# Patient Record
Sex: FEMALE | Race: WHITE | Employment: UNEMPLOYED | URBAN - METROPOLITAN AREA
[De-identification: names, ages, dates, MRNs, and addresses within clinical notes are randomized per-mention and may not be internally consistent; named-entity substitution may affect disease eponyms.]

---

## 2022-02-09 ENCOUNTER — TELEPHONE (OUTPATIENT)
Dept: ORTHOPEDIC SURGERY | Age: 32
End: 2022-02-09

## 2022-02-09 ENCOUNTER — HOSPITAL ENCOUNTER (EMERGENCY)
Age: 32
Discharge: HOME | End: 2022-02-09
Attending: EMERGENCY MEDICINE
Payer: COMMERCIAL

## 2022-02-09 ENCOUNTER — APPOINTMENT (OUTPATIENT)
Dept: GENERAL RADIOLOGY | Age: 32
End: 2022-02-09
Payer: COMMERCIAL

## 2022-02-09 VITALS
WEIGHT: 190 LBS | OXYGEN SATURATION: 100 % | HEIGHT: 69 IN | SYSTOLIC BLOOD PRESSURE: 126 MMHG | DIASTOLIC BLOOD PRESSURE: 72 MMHG | RESPIRATION RATE: 18 BRPM | BODY MASS INDEX: 28.14 KG/M2 | TEMPERATURE: 98 F | HEART RATE: 64 BPM

## 2022-02-09 DIAGNOSIS — S82.892A FX ANKLE, LEFT, CLOSED, INITIAL ENCOUNTER: Primary | ICD-10-CM

## 2022-02-09 PROCEDURE — 6360000002 HC RX W HCPCS: Performed by: EMERGENCY MEDICINE

## 2022-02-09 PROCEDURE — 73610 X-RAY EXAM OF ANKLE: CPT

## 2022-02-09 PROCEDURE — 99284 EMERGENCY DEPT VISIT MOD MDM: CPT

## 2022-02-09 PROCEDURE — 29345 APPLICATION OF LONG LEG CAST: CPT

## 2022-02-09 PROCEDURE — 73590 X-RAY EXAM OF LOWER LEG: CPT

## 2022-02-09 PROCEDURE — 6370000000 HC RX 637 (ALT 250 FOR IP)

## 2022-02-09 PROCEDURE — 29505 APPLICATION LONG LEG SPLINT: CPT

## 2022-02-09 RX ORDER — ONDANSETRON 4 MG/1
TABLET, ORALLY DISINTEGRATING ORAL
Status: COMPLETED
Start: 2022-02-09 | End: 2022-02-09

## 2022-02-09 RX ORDER — ONDANSETRON 4 MG/1
4 TABLET, FILM COATED ORAL DAILY PRN
Qty: 30 TABLET | Refills: 0 | Status: SHIPPED | OUTPATIENT
Start: 2022-02-09 | End: 2022-03-01

## 2022-02-09 RX ORDER — FENTANYL CITRATE 50 UG/ML
100 INJECTION, SOLUTION INTRAMUSCULAR; INTRAVENOUS ONCE
Status: COMPLETED | OUTPATIENT
Start: 2022-02-09 | End: 2022-02-09

## 2022-02-09 RX ORDER — ONDANSETRON 4 MG/1
4 TABLET, ORALLY DISINTEGRATING ORAL ONCE
Status: COMPLETED | OUTPATIENT
Start: 2022-02-09 | End: 2022-02-09

## 2022-02-09 RX ORDER — HYDROCODONE BITARTRATE AND ACETAMINOPHEN 5; 325 MG/1; MG/1
1 TABLET ORAL EVERY 4 HOURS PRN
Qty: 18 TABLET | Refills: 0 | Status: SHIPPED | OUTPATIENT
Start: 2022-02-09 | End: 2022-02-10 | Stop reason: ALTCHOICE

## 2022-02-09 RX ADMIN — ONDANSETRON 4 MG: 4 TABLET, ORALLY DISINTEGRATING ORAL at 11:37

## 2022-02-09 RX ADMIN — FENTANYL CITRATE 100 MCG: 0.05 INJECTION, SOLUTION INTRAMUSCULAR; INTRAVENOUS at 11:38

## 2022-02-09 ASSESSMENT — PAIN DESCRIPTION - PAIN TYPE: TYPE: ACUTE PAIN

## 2022-02-09 ASSESSMENT — PAIN DESCRIPTION - LOCATION: LOCATION: ANKLE

## 2022-02-09 ASSESSMENT — PAIN SCALES - GENERAL
PAINLEVEL_OUTOF10: 5
PAINLEVEL_OUTOF10: 7

## 2022-02-09 ASSESSMENT — PAIN DESCRIPTION - ORIENTATION: ORIENTATION: LEFT

## 2022-02-09 NOTE — ED PROVIDER NOTES
eMERGENCY dEPARTMENT eNCOUnter        Dejuan Abeler    Chief Complaint   Patient presents with    Ankle Pain     Came from [facility] from home d/t slipping on ice and falling on left ankle, hearing a snap. Ketamine IM was given by EMS. HPI    Judith Turner is a 32 y.o. female who presents to the ER for evaluation of trauma left leg, mechanical fall and injury audible pop. Mode arrival is EMS, received fentanyl by EMS. Not pregnant. History of hysterectomy. No sensory or motor deficit. Pain with any attempted range of motion though. Left ankle and proximal leg tenderness mild. REVIEW OF SYSTEMS    See HPI for further details. Review of systems otherwise negative.      PAST MEDICAL HISTORY    Past Medical History:   Diagnosis Date    Arthritis     Asthma     exercise induced    GERD (gastroesophageal reflux disease)     History of gestational diabetes     IBS (irritable bowel syndrome)     Non-alcoholic fatty liver disease     PONV (postoperative nausea and vomiting)        SURGICAL HISTORY    Past Surgical History:   Procedure Laterality Date    ANKLE FRACTURE SURGERY Left 2/14/2022    OPEN REDUCTION INTERNAL FIXATION LEFT ANKLE WITH SYNDESMOSIS SCREW FIXATION performed by Vlad Gamez MD at [address]     HYSTERECTOMY      SHOULDER SURGERY Left     ligament repair       CURRENT MEDICATIONS        ALLERGIES    Allergies   Allergen Reactions    Hydrocodone Nausea And Vomiting    Oxycodone Nausea And Vomiting     Can take with anti-nausea medication       FAMILY HISTORY    Family History   Problem Relation Age of Onset    High Blood Pressure Mother     Kidney Disease Father         kidney stones       SOCIAL HISTORY    Social History     Socioeconomic History    Marital status:      Spouse name: None    Number of children: None    Years of education: None    Highest education level: None   Occupational History    None   Tobacco Use    Smoking status: Current Every Day Smoker     Types: E-Cigarettes    Smokeless tobacco: Never Used   Vaping Use    Vaping Use: Every day    Substances: Nicotine, Flavoring    Devices: Disposable   Substance and Sexual Activity    Alcohol use: Yes     Comment: rare    Drug use: Never    Sexual activity: Yes     Partners: Male   Other Topics Concern    None   Social History Narrative    None     Social Determinants of Health     Financial Resource Strain:     Difficulty of Paying Living Expenses: Not on file   Food Insecurity:     Worried About Running Out of Food in the Last Year: Not on file    Maya of Food in the Last Year: Not on file   Transportation Needs:     Lack of Transportation (Medical): Not on file    Lack of Transportation (Non-Medical):  Not on file   Physical Activity:     Days of Exercise per Week: Not on file    Minutes of Exercise per Session: Not on file   Stress:     Feeling of Stress : Not on file   Social Connections:     Frequency of Communication with Friends and Family: Not on file    Frequency of Social Gatherings with Friends and Family: Not on file    Attends Confucianist Services: Not on file    Active Member of [address] [place] [address] or Organizations: Not on file    Attends Club or Organization Meetings: Not on file    Marital Status: Not on file   Intimate Partner Violence:     Fear of Current or Ex-Partner: Not on file    Emotionally Abused: Not on file    Physically Abused: Not on file    Sexually Abused: Not on file   Housing Stability:     Unable to Pay for Housing in the Last Year: Not on file    Number of [place] in the Last Year: Not on file    Unstable Housing in the Last Year: Not on file       PHYSICAL EXAM    VITAL SIGNS: /72   Pulse 64   Temp 98 °F (36.7 °C) (Oral)   Resp 18   Ht 5' 9\" (1.753 m)   Wt 190 lb (86.2 kg)   SpO2 100%   BMI 28.06 kg/m²   Constitutional:  Well developed, well nourished, no acute distress, non-toxic appearance HENT:  Atraumatic, external ears normal, nose normal, oropharynx moist.  Neck- normal range of motion, no tenderness, supple   Respiratory:  No respiratory distress, normal breath sounds. Cardiovascular:  Normal rate, normal rhythm, no murmurs, no gallops, no rubs   GI:  Soft, nondistended, normal bowel sounds, nontender   Musculoskeletal: Bilateral malleoli tenderness left mild proximal left leg tenderness  Integument:  Well hydrated, no rash   Neurologic: No sensory or motor deficit    RADIOLOGY/PROCEDURES    Trimalleolar fracture left foot and ankle    ED COURSE & MEDICAL DECISION MAKING    Pertinent Labs & Imaging studies reviewed. (See chart for details)  Sugar tong splint of the extremities, analgesia, consultation with orthopedics was performed, outpatient follow-up with orthopedics for definitive surgical management. FINAL IMPRESSION    1. Trimalleolar fracture left foot and ankle closed  2.          Massimo Beavers MD  47/16/35 1904

## 2022-02-09 NOTE — ED NOTES
Bed: 11  Expected date:   Expected time:   Means of arrival:   Comments:  Chavo Mario RN  02/09/22 6713

## 2022-02-09 NOTE — TELEPHONE ENCOUNTER
CPT: 97032  BODY PART: left ankle  STATUS: outpatient  AUTHORIZATION: NPR    Submitted online with [facility], [facility] Decision ID #:[id number]

## 2022-02-10 ENCOUNTER — OFFICE VISIT (OUTPATIENT)
Dept: ORTHOPEDIC SURGERY | Age: 32
End: 2022-02-10
Payer: COMMERCIAL

## 2022-02-10 VITALS — HEIGHT: 69 IN | BODY MASS INDEX: 28.88 KG/M2 | WEIGHT: 195 LBS

## 2022-02-10 DIAGNOSIS — S82.852A CLOSED TRIMALLEOLAR FRACTURE OF LEFT ANKLE, INITIAL ENCOUNTER: Primary | ICD-10-CM

## 2022-02-10 PROCEDURE — G8484 FLU IMMUNIZE NO ADMIN: HCPCS | Performed by: ORTHOPAEDIC SURGERY

## 2022-02-10 PROCEDURE — G8427 DOCREV CUR MEDS BY ELIG CLIN: HCPCS | Performed by: ORTHOPAEDIC SURGERY

## 2022-02-10 PROCEDURE — G8419 CALC BMI OUT NRM PARAM NOF/U: HCPCS | Performed by: ORTHOPAEDIC SURGERY

## 2022-02-10 PROCEDURE — 99203 OFFICE O/P NEW LOW 30 MIN: CPT | Performed by: ORTHOPAEDIC SURGERY

## 2022-02-10 RX ORDER — OXYCODONE HYDROCHLORIDE AND ACETAMINOPHEN 5; 325 MG/1; MG/1
1-2 TABLET ORAL EVERY 6 HOURS PRN
Qty: 30 TABLET | Refills: 0 | Status: ON HOLD | OUTPATIENT
Start: 2022-02-10 | End: 2022-02-14 | Stop reason: HOSPADM

## 2022-02-10 NOTE — PROGRESS NOTES
Dioni Sales  [id number]  February 10, 2022    Chief Complaint   Patient presents with    Ankle Injury     Left ankle. She fell on ice on 2/9/22           History: The patient is a 49-year-old female who is here for evaluation of the left ankle. The patient reportedly slipped on some ice and injured the left ankle. She immediately had a left ankle pain and swelling. The injury occurred on 2/9/2022. The patient has been taking Norco.  The pain medication is not helping. She denies any numbness or tingling. She denies any other associated injuries. This is a consult from Chaparro Rao MD for left ankle pain and swelling        The patient's  past medical history, medications, allergies,  family history, social history, and have been reviewed, and dated and are recorded in the chart. Pertinent items are noted in HPI. Review of systems reviewed from Pertinent History Form dated on 2/10/2022 and available in the patient's chart under the Media tab. Ht 5' 9\" (1.753 m)   Wt 195 lb (88.5 kg)   BMI 28.80 kg/m²     Physical Examination:   The patient presents today in no acute distress, awake, alert, and oriented. She is well dressed, nourished and  groomed. Patient with normal affect. Examination of the gait, showed that the patient walks with a crutch, NWB left leg and in a splint. Examination of both ankles showing a decreased range of motion of the left ankle compare to the other side. There is  moderate swelling that can be seen, as well as ecchymosis. She has intact sensation to light touch in the tibial, peroneal, sural, and saphenous nerve distributions. Pedal pulses are 1-2 +. She has significant tenderness on deep palpation over the Lateral and Medial malleolus of the left ankle. The foot shows brisk capillary refill. The patient is able to wiggle all toes. The patient is non tender to palpation of the mid foot, hind foot, or forefoot.  The skin reveals no evidence of blistering or open areas. Imaging:  Xrays, 3 views of the left ankle from initial evaluation were reviewed. The patient has evidence of a left trimalleolar ankle fracture with syndesmotic disruption. The mortise is widened medially. Impression:   Left Trimalleolar ankle fracture with syndesmotic disruption. Plan: At this time we will schedule the patient for open reduction and internal fixation of the Trimalleolar ankle fracture with syndesmotic screw fixation. All risks including infection, neurovascular injury, malunion, nonunion, hardware failure, post traumatic arthritis, and the risk of anesthesia were discussed. The patient understands all risks and benefits and agrees to proceed. The patient was given a prescription for Percocet.

## 2022-02-10 NOTE — PROGRESS NOTES
Covid testing to be done DOS  If positive---Pt instructed to notify MD ASAP   If negative--pt was instructed to bring results DOP/DOS

## 2022-02-10 NOTE — PROGRESS NOTES
[facility] time______0740______        Surgery time____0940________    Take the following medications with a sip of water: Follow your MD/Surgeons pre-procedure instructions regarding your medications    Do not eat or drink anything after 12:00 midnight prior to your surgery. This includes water chewing gum, mints and ice chips. You may brush your teeth and gargle the morning of your surgery, but do not swallow the water     Please see your family doctor/pediatrician for a history and physical and/or concerning medications. Bring any test results/reports from your physicians office. If you are under the care of a heart doctor or specialist doctor, please be aware that you may be asked to them for clearance    You may be asked to stop blood thinners such as Coumadin, Plavix, Fragmin, Lovenox, etc., or any anti-inflammatories such as:  Aspirin, Ibuprofen, Advil, Naproxen prior to your surgery. We also ask that you stop any OTC medications such as fish oil, vitamin E, glucosamine, garlic, Multivitamins, COQ 10, etc.    We ask that you do not smoke 24 hours prior to surgery  We ask that you do not  drink any alcoholic beverages 24 hours prior to surgery     You must make arrangements for a responsible adult to take you home after your surgery. For your safety you will not be allowed to leave alone or drive yourself home. Your surgery will be cancelled if you do not have a ride home. Also for your safety, it is strongly suggested that someone stay with you the first 24 hours after your surgery. A parent or legal guardian must accompany a child scheduled for surgery and plan to stay at the hospital until the child is discharged. Please do not bring other children with you. For your comfort, please wear simple loose fitting clothing to the hospital.  Please do not bring valuables.     Do not wear any make-up or nail polish on your fingers or toes      For your safety, please do not wear any jewelry or body piercing's on the day of surgery. All jewelry must be removed. If you have dentures, they will be removed before going to operating room. For your convenience, we will provide you with a container. If you wear contact lenses or glasses, they will be removed, please bring a case for them. If you have a living will and a durable power of  for healthcare, please bring in a copy. As part of our patient safety program to minimize surgical site infections, we ask you to do the following:    · Please notify your surgeon if you develop any illness between         now and the  day of your surgery. · This includes a cough, cold, fever, sore throat, nausea,         or vomiting, and diarrhea, etc.  ·  Please notify your surgeon if you experience dizziness, shortness         of breath or blurred vision between now and the time of your surgery. Do not shave your operative site 96 hours prior to surgery. For face and neck surgery, men may use an electric razor 48 hours   prior to surgery. You may shower the night before surgery or the morning of   your surgery with an antibacterial soap. You will need to bring a photo ID and insurance card    [facility] has an onsite pharmacy, would you like to utilize our pharmacy     If you will be staying overnight and use a C-pap machine, please bring   your C-pap to hospital     Our goal is to provide you with excellent care, therefore, visitors will be limited to two(2) in the room at a time so that we may focus on providing this care for you. Please contact pre-admission testing if you have any further questions. [facility] phone number:  [phone] PAT fax number:  [phone]  Please note these are generalized instructions for all surgical cases, you may be provided with more specific instructions according to your surgery.

## 2022-02-10 NOTE — PROGRESS NOTES
C-Difficile admission screening and protocol:     * Admitted with diarrhea? YES____    NO_X____     *Prior history of C-Diff. In last 3 months? YES____   NO__X___     *Antibiotic use in the past 6-8 weeks? NO___X___YES______                 If yes which  ANTIBIOTIC AND REASON______     *Prior hospitalization or nursing home in the last month?  YES____   NO__X_

## 2022-02-11 ENCOUNTER — ANESTHESIA EVENT (OUTPATIENT)
Dept: OPERATING ROOM | Age: 32
End: 2022-02-11
Payer: COMMERCIAL

## 2022-02-14 ENCOUNTER — HOSPITAL ENCOUNTER (OUTPATIENT)
Age: 32
Setting detail: OUTPATIENT SURGERY
Discharge: HOME | End: 2022-02-14
Attending: ORTHOPAEDIC SURGERY | Admitting: ORTHOPAEDIC SURGERY
Payer: COMMERCIAL

## 2022-02-14 ENCOUNTER — ANESTHESIA (OUTPATIENT)
Dept: OPERATING ROOM | Age: 32
End: 2022-02-14
Payer: COMMERCIAL

## 2022-02-14 ENCOUNTER — APPOINTMENT (OUTPATIENT)
Dept: GENERAL RADIOLOGY | Age: 32
End: 2022-02-14
Attending: ORTHOPAEDIC SURGERY
Payer: COMMERCIAL

## 2022-02-14 VITALS
OXYGEN SATURATION: 97 % | HEART RATE: 77 BPM | RESPIRATION RATE: 18 BRPM | SYSTOLIC BLOOD PRESSURE: 118 MMHG | HEIGHT: 69 IN | WEIGHT: 195 LBS | BODY MASS INDEX: 28.88 KG/M2 | DIASTOLIC BLOOD PRESSURE: 66 MMHG | TEMPERATURE: 97 F

## 2022-02-14 VITALS
SYSTOLIC BLOOD PRESSURE: 125 MMHG | OXYGEN SATURATION: 100 % | TEMPERATURE: 97.7 F | DIASTOLIC BLOOD PRESSURE: 56 MMHG | RESPIRATION RATE: 9 BRPM

## 2022-02-14 DIAGNOSIS — S82.892A CLOSED FRACTURE OF LEFT ANKLE, INITIAL ENCOUNTER: Primary | ICD-10-CM

## 2022-02-14 LAB — SARS-COV-2, NAAT: NOT DETECTED

## 2022-02-14 PROCEDURE — 7100000011 HC PHASE II RECOVERY - ADDTL 15 MIN: Performed by: ORTHOPAEDIC SURGERY

## 2022-02-14 PROCEDURE — A4217 STERILE WATER/SALINE, 500 ML: HCPCS | Performed by: ORTHOPAEDIC SURGERY

## 2022-02-14 PROCEDURE — 6360000002 HC RX W HCPCS: Performed by: NURSE ANESTHETIST, CERTIFIED REGISTERED

## 2022-02-14 PROCEDURE — 3600000014 HC SURGERY LEVEL 4 ADDTL 15MIN: Performed by: ORTHOPAEDIC SURGERY

## 2022-02-14 PROCEDURE — 2580000003 HC RX 258: Performed by: ANESTHESIOLOGY

## 2022-02-14 PROCEDURE — 3700000000 HC ANESTHESIA ATTENDED CARE: Performed by: ORTHOPAEDIC SURGERY

## 2022-02-14 PROCEDURE — 7100000000 HC PACU RECOVERY - FIRST 15 MIN: Performed by: ORTHOPAEDIC SURGERY

## 2022-02-14 PROCEDURE — 7100000001 HC PACU RECOVERY - ADDTL 15 MIN: Performed by: ORTHOPAEDIC SURGERY

## 2022-02-14 PROCEDURE — 3700000001 HC ADD 15 MINUTES (ANESTHESIA): Performed by: ORTHOPAEDIC SURGERY

## 2022-02-14 PROCEDURE — 7100000010 HC PHASE II RECOVERY - FIRST 15 MIN: Performed by: ORTHOPAEDIC SURGERY

## 2022-02-14 PROCEDURE — 3600000004 HC SURGERY LEVEL 4 BASE: Performed by: ORTHOPAEDIC SURGERY

## 2022-02-14 PROCEDURE — 2500000003 HC RX 250 WO HCPCS: Performed by: ORTHOPAEDIC SURGERY

## 2022-02-14 PROCEDURE — 2709999900 HC NON-CHARGEABLE SUPPLY: Performed by: ORTHOPAEDIC SURGERY

## 2022-02-14 PROCEDURE — 87635 SARS-COV-2 COVID-19 AMP PRB: CPT

## 2022-02-14 PROCEDURE — 3209999900 FLUORO FOR SURGICAL PROCEDURES

## 2022-02-14 PROCEDURE — 6370000000 HC RX 637 (ALT 250 FOR IP): Performed by: STUDENT IN AN ORGANIZED HEALTH CARE EDUCATION/TRAINING PROGRAM

## 2022-02-14 PROCEDURE — 2500000003 HC RX 250 WO HCPCS: Performed by: NURSE ANESTHETIST, CERTIFIED REGISTERED

## 2022-02-14 PROCEDURE — C1713 ANCHOR/SCREW BN/BN,TIS/BN: HCPCS | Performed by: ORTHOPAEDIC SURGERY

## 2022-02-14 PROCEDURE — 2580000003 HC RX 258: Performed by: ORTHOPAEDIC SURGERY

## 2022-02-14 PROCEDURE — 6360000002 HC RX W HCPCS: Performed by: ORTHOPAEDIC SURGERY

## 2022-02-14 PROCEDURE — 73610 X-RAY EXAM OF ANKLE: CPT

## 2022-02-14 PROCEDURE — 6360000002 HC RX W HCPCS: Performed by: STUDENT IN AN ORGANIZED HEALTH CARE EDUCATION/TRAINING PROGRAM

## 2022-02-14 DEVICE — SCREW BNE L14MM DIA3.5MM CORT S STL ST NONCANNULATED LOK: Type: IMPLANTABLE DEVICE | Site: ANKLE | Status: FUNCTIONAL

## 2022-02-14 DEVICE — SCREW BNE L12MM DIA3.5MM CORT S STL ST NONCANNULATED LOK: Type: IMPLANTABLE DEVICE | Site: ANKLE | Status: FUNCTIONAL

## 2022-02-14 DEVICE — SCREW BNE L50MM DIA3.5MM CORT S STL ST NONCANNULATED LOK
Type: IMPLANTABLE DEVICE | Site: ANKLE | Status: NON-FUNCTIONAL
Removed: 2022-04-25

## 2022-02-14 DEVICE — PLATE BNE L93MM 8 H S STL 1/3 TBLR LOK COMPR W/ CLLR FOR: Type: IMPLANTABLE DEVICE | Site: ANKLE | Status: FUNCTIONAL

## 2022-02-14 DEVICE — SCREW BNE L16MM DIA4MM CANC S STL ST CANN NONLOCKING FULL: Type: IMPLANTABLE DEVICE | Site: ANKLE | Status: FUNCTIONAL

## 2022-02-14 DEVICE — SCREW CORTX SLFTP FTHRD 3.5X18MM: Type: IMPLANTABLE DEVICE | Site: ANKLE | Status: FUNCTIONAL

## 2022-02-14 DEVICE — SCREW BNE L40MM DIA4MM CANC S STL PARTIALLY THRD SM HEX
Type: IMPLANTABLE DEVICE | Site: ANKLE | Status: NON-FUNCTIONAL
Removed: 2022-04-25

## 2022-02-14 RX ORDER — SODIUM CHLORIDE 9 MG/ML
25 INJECTION, SOLUTION INTRAVENOUS PRN
Status: DISCONTINUED | OUTPATIENT
Start: 2022-02-14 | End: 2022-02-14 | Stop reason: HOSPADM

## 2022-02-14 RX ORDER — OXYCODONE HYDROCHLORIDE AND ACETAMINOPHEN 5; 325 MG/1; MG/1
1 TABLET ORAL
Status: COMPLETED | OUTPATIENT
Start: 2022-02-14 | End: 2022-02-14

## 2022-02-14 RX ORDER — LIDOCAINE HYDROCHLORIDE 20 MG/ML
INJECTION, SOLUTION EPIDURAL; INFILTRATION; INTRACAUDAL; PERINEURAL PRN
Status: DISCONTINUED | OUTPATIENT
Start: 2022-02-14 | End: 2022-02-14 | Stop reason: SDUPTHER

## 2022-02-14 RX ORDER — MAGNESIUM HYDROXIDE 1200 MG/15ML
LIQUID ORAL CONTINUOUS PRN
Status: COMPLETED | OUTPATIENT
Start: 2022-02-14 | End: 2022-02-14

## 2022-02-14 RX ORDER — PROCHLORPERAZINE EDISYLATE 5 MG/ML
5 INJECTION INTRAMUSCULAR; INTRAVENOUS
Status: DISCONTINUED | OUTPATIENT
Start: 2022-02-14 | End: 2022-02-14 | Stop reason: HOSPADM

## 2022-02-14 RX ORDER — MEPERIDINE HYDROCHLORIDE 25 MG/ML
12.5 INJECTION INTRAMUSCULAR; INTRAVENOUS; SUBCUTANEOUS EVERY 5 MIN PRN
Status: DISCONTINUED | OUTPATIENT
Start: 2022-02-14 | End: 2022-02-14 | Stop reason: HOSPADM

## 2022-02-14 RX ORDER — PROPOFOL 10 MG/ML
INJECTION, EMULSION INTRAVENOUS PRN
Status: DISCONTINUED | OUTPATIENT
Start: 2022-02-14 | End: 2022-02-14 | Stop reason: SDUPTHER

## 2022-02-14 RX ORDER — SCOLOPAMINE TRANSDERMAL SYSTEM 1 MG/1
1 PATCH, EXTENDED RELEASE TRANSDERMAL ONCE
Status: DISCONTINUED | OUTPATIENT
Start: 2022-02-14 | End: 2022-02-14 | Stop reason: HOSPADM

## 2022-02-14 RX ORDER — SODIUM CHLORIDE 0.9 % (FLUSH) 0.9 %
5-40 SYRINGE (ML) INJECTION PRN
Status: DISCONTINUED | OUTPATIENT
Start: 2022-02-14 | End: 2022-02-14 | Stop reason: HOSPADM

## 2022-02-14 RX ORDER — SODIUM CHLORIDE 9 MG/ML
INJECTION, SOLUTION INTRAVENOUS CONTINUOUS
Status: DISCONTINUED | OUTPATIENT
Start: 2022-02-14 | End: 2022-02-14 | Stop reason: HOSPADM

## 2022-02-14 RX ORDER — DEXAMETHASONE SODIUM PHOSPHATE 4 MG/ML
INJECTION, SOLUTION INTRA-ARTICULAR; INTRALESIONAL; INTRAMUSCULAR; INTRAVENOUS; SOFT TISSUE PRN
Status: DISCONTINUED | OUTPATIENT
Start: 2022-02-14 | End: 2022-02-14 | Stop reason: SDUPTHER

## 2022-02-14 RX ORDER — FENTANYL CITRATE 50 UG/ML
25 INJECTION, SOLUTION INTRAMUSCULAR; INTRAVENOUS EVERY 5 MIN PRN
Status: DISCONTINUED | OUTPATIENT
Start: 2022-02-14 | End: 2022-02-14 | Stop reason: HOSPADM

## 2022-02-14 RX ORDER — OXYCODONE HYDROCHLORIDE AND ACETAMINOPHEN 5; 325 MG/1; MG/1
1 TABLET ORAL EVERY 6 HOURS PRN
Qty: 28 TABLET | Refills: 0 | Status: SHIPPED | OUTPATIENT
Start: 2022-02-14 | End: 2022-02-21

## 2022-02-14 RX ORDER — OXYCODONE HYDROCHLORIDE AND ACETAMINOPHEN 5; 325 MG/1; MG/1
1 TABLET ORAL ONCE
Status: DISCONTINUED | OUTPATIENT
Start: 2022-02-14 | End: 2022-02-14 | Stop reason: HOSPADM

## 2022-02-14 RX ORDER — HYDRALAZINE HYDROCHLORIDE 20 MG/ML
5 INJECTION INTRAMUSCULAR; INTRAVENOUS EVERY 10 MIN PRN
Status: DISCONTINUED | OUTPATIENT
Start: 2022-02-14 | End: 2022-02-14 | Stop reason: HOSPADM

## 2022-02-14 RX ORDER — LABETALOL HYDROCHLORIDE 5 MG/ML
5 INJECTION, SOLUTION INTRAVENOUS EVERY 10 MIN PRN
Status: DISCONTINUED | OUTPATIENT
Start: 2022-02-14 | End: 2022-02-14 | Stop reason: HOSPADM

## 2022-02-14 RX ORDER — FENTANYL CITRATE 50 UG/ML
50 INJECTION, SOLUTION INTRAMUSCULAR; INTRAVENOUS EVERY 5 MIN PRN
Status: DISCONTINUED | OUTPATIENT
Start: 2022-02-14 | End: 2022-02-14 | Stop reason: HOSPADM

## 2022-02-14 RX ORDER — ONDANSETRON 2 MG/ML
INJECTION INTRAMUSCULAR; INTRAVENOUS PRN
Status: DISCONTINUED | OUTPATIENT
Start: 2022-02-14 | End: 2022-02-14 | Stop reason: SDUPTHER

## 2022-02-14 RX ORDER — DIPHENHYDRAMINE HYDROCHLORIDE 50 MG/ML
12.5 INJECTION INTRAMUSCULAR; INTRAVENOUS
Status: DISCONTINUED | OUTPATIENT
Start: 2022-02-14 | End: 2022-02-14 | Stop reason: HOSPADM

## 2022-02-14 RX ORDER — ONDANSETRON 2 MG/ML
4 INJECTION INTRAMUSCULAR; INTRAVENOUS
Status: DISCONTINUED | OUTPATIENT
Start: 2022-02-14 | End: 2022-02-14 | Stop reason: HOSPADM

## 2022-02-14 RX ORDER — BUPIVACAINE HYDROCHLORIDE 2.5 MG/ML
INJECTION, SOLUTION EPIDURAL; INFILTRATION; INTRACAUDAL
Status: COMPLETED | OUTPATIENT
Start: 2022-02-14 | End: 2022-02-14

## 2022-02-14 RX ORDER — MIDAZOLAM HYDROCHLORIDE 1 MG/ML
INJECTION INTRAMUSCULAR; INTRAVENOUS PRN
Status: DISCONTINUED | OUTPATIENT
Start: 2022-02-14 | End: 2022-02-14 | Stop reason: SDUPTHER

## 2022-02-14 RX ORDER — SODIUM CHLORIDE 0.9 % (FLUSH) 0.9 %
5-40 SYRINGE (ML) INJECTION EVERY 12 HOURS SCHEDULED
Status: DISCONTINUED | OUTPATIENT
Start: 2022-02-14 | End: 2022-02-14 | Stop reason: HOSPADM

## 2022-02-14 RX ORDER — FENTANYL CITRATE 50 UG/ML
INJECTION, SOLUTION INTRAMUSCULAR; INTRAVENOUS PRN
Status: DISCONTINUED | OUTPATIENT
Start: 2022-02-14 | End: 2022-02-14 | Stop reason: SDUPTHER

## 2022-02-14 RX ADMIN — DEXAMETHASONE SODIUM PHOSPHATE 10 MG: 4 INJECTION, SOLUTION INTRAMUSCULAR; INTRAVENOUS at 10:01

## 2022-02-14 RX ADMIN — PROPOFOL 200 MG: 10 INJECTION, EMULSION INTRAVENOUS at 10:01

## 2022-02-14 RX ADMIN — LIDOCAINE HYDROCHLORIDE 100 MG: 20 INJECTION, SOLUTION EPIDURAL; INFILTRATION; INTRACAUDAL; PERINEURAL at 10:01

## 2022-02-14 RX ADMIN — FENTANYL CITRATE 50 MCG: 50 INJECTION, SOLUTION INTRAMUSCULAR; INTRAVENOUS at 12:09

## 2022-02-14 RX ADMIN — FENTANYL CITRATE 50 MCG: 50 INJECTION INTRAMUSCULAR; INTRAVENOUS at 10:04

## 2022-02-14 RX ADMIN — MIDAZOLAM 2 MG: 1 INJECTION INTRAMUSCULAR; INTRAVENOUS at 09:59

## 2022-02-14 RX ADMIN — HYDROMORPHONE HYDROCHLORIDE 1 MG: 1 INJECTION, SOLUTION INTRAMUSCULAR; INTRAVENOUS; SUBCUTANEOUS at 10:25

## 2022-02-14 RX ADMIN — OXYCODONE AND ACETAMINOPHEN 1 TABLET: 5; 325 TABLET ORAL at 13:57

## 2022-02-14 RX ADMIN — ONDANSETRON 4 MG: 2 INJECTION INTRAMUSCULAR; INTRAVENOUS at 10:52

## 2022-02-14 RX ADMIN — CEFAZOLIN 2000 MG: 10 INJECTION, POWDER, FOR SOLUTION INTRAVENOUS at 09:59

## 2022-02-14 RX ADMIN — SODIUM CHLORIDE: 9 INJECTION, SOLUTION INTRAVENOUS at 09:59

## 2022-02-14 RX ADMIN — FENTANYL CITRATE 50 MCG: 50 INJECTION INTRAMUSCULAR; INTRAVENOUS at 10:08

## 2022-02-14 RX ADMIN — FENTANYL CITRATE 25 MCG: 50 INJECTION, SOLUTION INTRAMUSCULAR; INTRAVENOUS at 12:40

## 2022-02-14 RX ADMIN — FENTANYL CITRATE 25 MCG: 50 INJECTION, SOLUTION INTRAMUSCULAR; INTRAVENOUS at 12:45

## 2022-02-14 ASSESSMENT — PULMONARY FUNCTION TESTS
PIF_VALUE: 2
PIF_VALUE: 1
PIF_VALUE: 9
PIF_VALUE: 9
PIF_VALUE: 10
PIF_VALUE: 3
PIF_VALUE: 1
PIF_VALUE: 3
PIF_VALUE: 2
PIF_VALUE: 8
PIF_VALUE: 8
PIF_VALUE: 1
PIF_VALUE: 9
PIF_VALUE: 10
PIF_VALUE: 8
PIF_VALUE: 3
PIF_VALUE: 3
PIF_VALUE: 8
PIF_VALUE: 10
PIF_VALUE: 3
PIF_VALUE: 1
PIF_VALUE: 10
PIF_VALUE: 9
PIF_VALUE: 10
PIF_VALUE: 3
PIF_VALUE: 10
PIF_VALUE: 10
PIF_VALUE: 3
PIF_VALUE: 18
PIF_VALUE: 3
PIF_VALUE: 10
PIF_VALUE: 10
PIF_VALUE: 3
PIF_VALUE: 10
PIF_VALUE: 10
PIF_VALUE: 8
PIF_VALUE: 10
PIF_VALUE: 0
PIF_VALUE: 3
PIF_VALUE: 1
PIF_VALUE: 9
PIF_VALUE: 10
PIF_VALUE: 3
PIF_VALUE: 9
PIF_VALUE: 3
PIF_VALUE: 9
PIF_VALUE: 3
PIF_VALUE: 1
PIF_VALUE: 10
PIF_VALUE: 3
PIF_VALUE: 9
PIF_VALUE: 3
PIF_VALUE: 10
PIF_VALUE: 8
PIF_VALUE: 1
PIF_VALUE: 10
PIF_VALUE: 3
PIF_VALUE: 3
PIF_VALUE: 9
PIF_VALUE: 2
PIF_VALUE: 10
PIF_VALUE: 10
PIF_VALUE: 1
PIF_VALUE: 1
PIF_VALUE: 0
PIF_VALUE: 8
PIF_VALUE: 10
PIF_VALUE: 3
PIF_VALUE: 9
PIF_VALUE: 3
PIF_VALUE: 2
PIF_VALUE: 3
PIF_VALUE: 8
PIF_VALUE: 10
PIF_VALUE: 8
PIF_VALUE: 3
PIF_VALUE: 10
PIF_VALUE: 4
PIF_VALUE: 3
PIF_VALUE: 9
PIF_VALUE: 3
PIF_VALUE: 8

## 2022-02-14 ASSESSMENT — PAIN DESCRIPTION - ORIENTATION
ORIENTATION: LEFT

## 2022-02-14 ASSESSMENT — PAIN DESCRIPTION - PROGRESSION
CLINICAL_PROGRESSION: GRADUALLY WORSENING
CLINICAL_PROGRESSION: NOT CHANGED
CLINICAL_PROGRESSION: GRADUALLY IMPROVING
CLINICAL_PROGRESSION: GRADUALLY IMPROVING
CLINICAL_PROGRESSION: NOT CHANGED
CLINICAL_PROGRESSION: GRADUALLY IMPROVING

## 2022-02-14 ASSESSMENT — PAIN - FUNCTIONAL ASSESSMENT
PAIN_FUNCTIONAL_ASSESSMENT: PREVENTS OR INTERFERES SOME ACTIVE ACTIVITIES AND ADLS
PAIN_FUNCTIONAL_ASSESSMENT: 0-10
PAIN_FUNCTIONAL_ASSESSMENT: PREVENTS OR INTERFERES SOME ACTIVE ACTIVITIES AND ADLS

## 2022-02-14 ASSESSMENT — PAIN SCALES - GENERAL
PAINLEVEL_OUTOF10: 5
PAINLEVEL_OUTOF10: 6
PAINLEVEL_OUTOF10: 4
PAINLEVEL_OUTOF10: 6
PAINLEVEL_OUTOF10: 0
PAINLEVEL_OUTOF10: 4
PAINLEVEL_OUTOF10: 7

## 2022-02-14 ASSESSMENT — PAIN DESCRIPTION - FREQUENCY
FREQUENCY: CONTINUOUS

## 2022-02-14 ASSESSMENT — PAIN DESCRIPTION - ONSET
ONSET: ON-GOING
ONSET: AWAKENED FROM SLEEP

## 2022-02-14 ASSESSMENT — PAIN DESCRIPTION - PAIN TYPE
TYPE: SURGICAL PAIN

## 2022-02-14 ASSESSMENT — LIFESTYLE VARIABLES: SMOKING_STATUS: 1

## 2022-02-14 ASSESSMENT — PAIN DESCRIPTION - LOCATION
LOCATION: ANKLE

## 2022-02-14 ASSESSMENT — PAIN DESCRIPTION - DESCRIPTORS
DESCRIPTORS: SQUEEZING
DESCRIPTORS: SQUEEZING
DESCRIPTORS: SORE
DESCRIPTORS: SQUEEZING
DESCRIPTORS: SQUEEZING
DESCRIPTORS: ACHING;THROBBING
DESCRIPTORS: SORE

## 2022-02-14 NOTE — ANESTHESIA PRE PROCEDURE
Department of Anesthesiology  Preprocedure Note       Name:  Dave Hou   Age:  32 y.o.  :  1990                                          MRN:  [id number]         Date:  2022      Surgeon: Aziza Hedrick):  Jarrett Frey MD    Procedure: Procedure(s):  OPEN REDUCTION INTERNAL FIXATION, LEFT ANKLE    Medications prior to admission:   Prior to Admission medications    Medication Sig Start Date End Date Taking? Authorizing Provider   oxyCODONE-acetaminophen (PERCOCET) 5-325 MG per tablet Take 1-2 tablets by mouth every 6 hours as needed for Pain for up to 5 days.  2/10/22 2/15/22 Yes Jarrett Frey MD   ondansetron [facility]) 4 MG tablet Take 1 tablet by mouth daily as needed for Nausea or Vomiting   Yes Damion Goodson MD       Current medications:    Current Facility-Administered Medications   Medication Dose Route Frequency Provider Last Rate Last Admin    ceFAZolin (ANCEF) 2000 mg in dextrose 5 % 100 mL IVPB  2,000 mg IntraVENous Once Jarrett Frey MD        0.9 % sodium chloride infusion   IntraVENous Continuous Priyanka Lewis MD        sodium chloride flush 0.9 % injection 5-40 mL  5-40 mL IntraVENous 2 times per day Priyanka Lewis MD        sodium chloride flush 0.9 % injection 5-40 mL  5-40 mL IntraVENous PRN Priyanka Lewis MD        0.9 % sodium chloride infusion  25 mL IntraVENous PRN Priyanka Lewis MD        scopolamine (TRANSDERM-SCOP) transdermal patch 1 patch  1 patch TransDERmal Once Dallin Valentine MD        HYDROmorphone (DILAUDID) injection 0.25 mg  0.25 mg IntraVENous Q5 Min PRN Dallin Valentine MD        HYDROmorphone (DILAUDID) injection 0.5 mg  0.5 mg IntraVENous Q5 Min PRN Dallin Valentine MD        oxyCODONE-acetaminophen (PERCOCET) 5-325 MG per tablet 1 tablet  1 tablet Oral Once PRN MD Alexis Pricedansetron [facility]) injection 4 mg  4 mg IntraVENous Once PRN Dallin Valentine MD        prochlorperazine (COMPAZINE) injection 5 mg  5 mg IntraVENous Once PRN Isabel Linn MD        labetalol (NORMODYNE;TRANDATE) injection 5 mg  5 mg IntraVENous Q10 Min PRN Isabel Linn MD        hydrALAZINE (APRESOLINE) injection 5 mg  5 mg IntraVENous Q10 Min PRN Isabel Linn MD        diphenhydrAMINE (BENADRYL) injection 12.5 mg  12.5 mg IntraVENous Once PRN Isabel Linn MD        meperidine (DEMEROL) injection 12.5 mg  12.5 mg IntraVENous Q5 Min PRN Isabel Linn MD           Allergies: Allergies   Allergen Reactions    Hydrocodone Nausea And Vomiting    Oxycodone Nausea And Vomiting     Can take with anti-nausea medication       Problem List:  There is no problem list on file for this patient.       Past Medical History:        Diagnosis Date    Arthritis     Asthma     exercise induced    GERD (gastroesophageal reflux disease)     History of gestational diabetes     IBS (irritable bowel syndrome)     Non-alcoholic fatty liver disease     PONV (postoperative nausea and vomiting)        Past Surgical History:        Procedure Laterality Date    HIP SURGERY Right     HYSTERECTOMY      SHOULDER SURGERY Left     ligament repair       Social History:    Social History     Tobacco Use    Smoking status: Current Every Day Smoker     Types: E-Cigarettes    Smokeless tobacco: Never Used   Substance Use Topics    Alcohol use: Yes     Comment: rare                                Ready to quit: Not Answered  Counseling given: Not Answered      Vital Signs (Current):   Vitals:    02/10/22 1405 02/14/22 0737 02/14/22 0753   BP:   111/70   Pulse:   66   Resp:   16   Temp:   96.5 °F (35.8 °C)   TempSrc:   Temporal   SpO2:   96%   Weight: 195 lb (88.5 kg) 195 lb (88.5 kg)    Height: 5' 9\" (1.753 m) 5' 9\" (1.753 m)                                               BP Readings from Last 3 Encounters:   02/14/22 111/70   02/09/22 126/72       NPO Status: Time of last liquid consumption: 2200 Time of last solid consumption: 1930                        Date of last liquid consumption: 02/13/22                        Date of last solid food consumption: 02/13/22    BMI:   Wt Readings from Last 3 Encounters:   02/14/22 195 lb (88.5 kg)   02/10/22 195 lb (88.5 kg)   02/09/22 190 lb (86.2 kg)     Body mass index is 28.8 kg/m². CBC: No results found for: WBC, RBC, HGB, HCT, MCV, RDW, PLT    CMP: No results found for: NA, K, CL, CO2, BUN, CREATININE, GFRAA, AGRATIO, LABGLOM, GLUCOSE, GLU, PROT, CALCIUM, BILITOT, ALKPHOS, AST, ALT    POC Tests: No results for input(s): POCGLU, POCNA, POCK, POCCL, POCBUN, POCHEMO, POCHCT in the last 72 hours. Coags: No results found for: PROTIME, INR, APTT    HCG (If Applicable): No results found for: PREGTESTUR, PREGSERUM, HCG, HCGQUANT     ABGs: No results found for: PHART, PO2ART, QQB7VOZ, EWV5OJU, BEART, M2KXDAIE     Type & Screen (If Applicable):  No results found for: LABABO, LABRH    Drug/Infectious Status (If Applicable):  No results found for: HIV, HEPCAB    COVID-19 Screening (If Applicable):   Lab Results   Component Value Date    COVID19 Not Detected 02/14/2022           Anesthesia Evaluation     history of anesthetic complications (prevented with scopolamine patch in past): PONV. Airway: Mallampati: II  TM distance: >3 FB   Neck ROM: full  Mouth opening: > = 3 FB Dental:      Comment: Denies loose teeth    Pulmonary: breath sounds clear to auscultation  (+) asthma: current smoker (Complete cessation encouraged)    (-) rhonchi, wheezes and rales          Patient did not smoke on day of surgery.                  Cardiovascular:  Exercise tolerance: good (>4 METS),       (-) hypertension, orthopnea and  GARIBAY      Rhythm: regular  Rate: normal                    Neuro/Psych:      (-) seizures, TIA and CVA            ROS comment: Nausea with pain meds in past GI/Hepatic/Renal:   (+) GERD:,      (-) liver disease, no renal disease and no morbid obesity Endo/Other:        (-) hypothyroidism, hyperthyroidism               Abdominal:   (+) obese,     Abdomen: soft. Vascular: Other Findings:           Anesthesia Plan      MAC     ASA 2     (Recently moved from [facility] to [facility]; limited records available through [facility]. NPO appropriate; denies nausea / reflux. Scopolamine patch ordered in preop. )  Induction: intravenous. MIPS: Postoperative opioids intended and Prophylactic antiemetics administered. Anesthetic plan and risks discussed with patient. Use of blood products discussed with patient whom. Plan discussed with CRNA. This pre-anesthesia assessment may be used as a history and physical.    DOS STAFF ADDENDUM:    Pt seen and examined, chart reviewed (including anesthesia, drug and allergy history). No interval changes to history and physical examination. Anesthetic plan, risks, benefits, alternatives, and personnel involved discussed with patient. Patient verbalized an understanding and agrees to proceed.       Alden Martinez MD  February 14, 2022  8:45 AM

## 2022-02-14 NOTE — PROGRESS NOTES
Patient more awake and alert. Patient C/O surgical pain at 7 of 10 and medicated per order. See MAR. VSS. IV patent.

## 2022-02-14 NOTE — PROGRESS NOTES
Patient admitted to PACU from OR. Patient opens eyes to name. Resp easy unlabored on 3L NC with SaO2 95%. Monitor in SR. Left lower leg cast/splint ace wrap dressing dry and intact with pulses palpable. Ice pack to left ankle and FOB elevated. Patient denies C/O pain or nausea. VSS. IV patent to right AC.

## 2022-02-14 NOTE — ANESTHESIA POSTPROCEDURE EVALUATION
Department of Anesthesiology  Postprocedure Note    Patient: Nick Sierra  MRN: [id number]  YOB: 1990  Date of evaluation: 2/14/2022  Time:  5:56 PM     Procedure Summary     Date: 02/14/22 Room / Location: [address]    Anesthesia Start: 1591 Anesthesia Stop: 1134    Procedure: OPEN REDUCTION INTERNAL FIXATION LEFT ANKLE WITH SYNDESMOSIS SCREW FIXATION (Left Ankle) Diagnosis: (TRIMALLEOLAR FRACTURE, LEFT ANKLE)    Surgeons: Edith Tong MD Responsible Provider: Andres Cutler MD    Anesthesia Type: MAC ASA Status: 2          Anesthesia Type: MAC    Ubaldo Phase I: Ubaldo Score: 9    Ubaldo Phase II: Ubaldo Score: 10    Last vitals: Reviewed and per EMR flowsheets. Anesthesia Post Evaluation    Patient location during evaluation: PACU  Patient participation: complete - patient participated  Level of consciousness: sleepy but conscious  Airway patency: patent  Nausea & Vomiting: no nausea and no vomiting  Complications: no  Cardiovascular status: hemodynamically stable and blood pressure returned to baseline  Respiratory status: spontaneous ventilation, nonlabored ventilation and room air  Hydration status: stable  Comments: Ms. Ryann Guevara resting comfortably in PACU. Will allow patient to become more alert before anticipate transfer to Phase II for planned discharge home.       Andres Cutler MD

## 2022-02-14 NOTE — PROGRESS NOTES
Patient resting more comfortably, dozing off and on. Pain level 5 of 10 and tolerable. VSS. IV patent. Patient denies C/O nausea.

## 2022-02-14 NOTE — PROGRESS NOTES
Patient  Awake and alert. Resp easy unlabored on room air O2 with SaO2 96%. Pain level 4 of 10 and tolerable. VSS. Left ankle dressing unchanged. Ice pack to left ankle. Patient denies C/O nausea. Patient stable to transfer to ACU for phase II.

## 2022-02-14 NOTE — PROGRESS NOTES
Patient C/O surgical pain at 6 of 10 and medicated per ordr. See MAR. VSS. IV patent. Patient denies C/O nausea. Taking ice chips prn.

## 2022-02-14 NOTE — OP NOTE
Operative Note      Patient: Kilo Chin  YOB: 1990  MRN: [id number]    Date of Procedure: 2/14/2022    Pre-Op Diagnosis: TRIMALLEOLAR FRACTURE, LEFT ANKLE    Post-Op Diagnosis: Left trimalleolar ankle fracture with syndesmotic disruption       Procedure(s):  OPEN REDUCTION INTERNAL FIXATION OF LEFT ANKLE without fixation of posterior rim fracture and syndesmotic screw fixation of left    Surgeon(s):  Lew Elliott MD    Assistant:   * No surgical staff found *    Anesthesia: General    Estimated Blood Loss (mL): less than 50     Complications: None    Specimens:   * No specimens in log *    Implants:  * No implants in log *      Drains: * No LDAs found *    Findings: Moderate comminution of the fibula    Detailed Description of Procedure:       PATIENT NAME:                     Julissa Villafuerte  YOB: 1990   MEDICAL RECORD#         [id number]  SURGERY DATE:         2/14/2022  SURGEON:                 Lew Elliott MD      PREOPERATIVE DIAGNOSIS:Displaced left Trimalleolar ankle fracture. POSTOPERATIVE DIAGNOSIS:Displaced left trimalleolar ankle fracture with syndesmotic disruption of the left ankle. PROCEDURE: Open reduction and internal fixation of medial and lateral   Malleolus without fixation of posterior rim fracture left ankle. #2 syndesmotic screw fixation of left ankle #3 intraoperative C arm fluoroscopic evaluation and interpretation    ANESTHESIA: General anesthesia. IV FLUIDS: Crystalloid. ESTIMATED BLOOD LOSS: Minimal.     COMPLICATIONS: None. Patient tolerated the procedure quite well. INDICATIONS:  The patient is a 32 y.o. female. History of injury: fall on ice. Orthopedics was consulted. On evaluation, the patient was found to have a displaced trimalleolar ankle fracture . Given the nature of her injury, the patient was cleared and scheduled for surgery.       OPERATIVE REPORT:The patient was identified preoperatively. The operative extremity was then marked. The patient was then taken to the operating room and general anesthesia was administered by Anesthesia. Appropriate preoperative antibiotics were administered per SCIP. A nonsterile tourniquet was applied to the thigh. The lower extremity was then ChloraPrepped in standard fashion. We then draped out in standard fashion. The tourniquet was elevated to 300 mmHg. We then were ready for incision. Using a standard straight lateral longitudinal incision centered over the fibula, we incised skin and coagulated all bleeders with Bovie electrocautery. We   dissected down, identified the superficial peroneal nerve. This was   protected at all times. We then got down onto the fibula and split the   periosteum. We elevated the soft tissues both anteriorly and posteriorly. We then got down onto the fracture site and removed all periosteum from   within the fracture. There was moderate comminution at the fracture site. We irrigated out the fracture rather copiously. We then got the fibula out   to length and restored the proper rotation/ alignment. We then went ahead   and clamped the fracture, and the fracture was anatomically reduced. We then   went ahead and applied a Synthes 8 hole one third semitubular plate across the fracture. The 2 central screws were not utilized due to the long obliquity of the fracture/comminution. The most distal screw was a fully threaded cancellous screw. The proximal 3 screws were cortical screws. The second most distal screw was a cortical screw. All screw holes were drilled, depth gauged, and then inserted in appropriate fashion. We did use the large C-arm throughout the case. Intraoperative biplanar C-arm fluoroscopy was utilized throughout the entire procedure. The fracture was   anatomically reduced, and the fibula was out to length. We then directed our   attention medially.  We used an anteromedial incision centered over the   medial malleolus. We incised skin and coagulated all bleeders with Bovie   electrocautery. We then dissected down and identified and protected the   saphenous nerve and vein at all times. We then got down to the fracture site   and split the periosteum. There was a significant amount of periosteum   within the fracture, and this was removed. We then went ahead and   anatomically reduced and clamped the fracture, and this worked out rather   well. We then drilled from distal to proximal and inserted 1 4-mm partially threaded cancellous screw across the fracture. We used the large C-arm, and   the fractures were anatomically reduced. Due to the syndesmotic disruption, we elected to use a syndesmotic screw through the third most distal screw hole within our plate. We drilled engaging 3 cortices. We then depth gauge. We then inserted the appropriate length syndesmotic screw. The ankle was dorsiflexed throughout this process. The mortise was restored. All   hardware was in good position. We did assess the posterior malleolus fracture and this did not require fixation. It was indirectly reduced by nature of fixating the medial and lateral malleolus. We irrigated all layers rather copiously. We   closed our incisions with interrupted 2-0 simple Vicryl stitches. The skin   was then closed with 3-0 nylon horizontal mattress sutures. Sterile dressings were applied. The patient   was put in a well molded, padded splint.        Electronically signed by Syed Haskins MD on 2/14/2022 at 9:12 AM

## 2022-02-14 NOTE — H&P
The patient was interviewed and examined and there have been no changes since the documented History and Physical.  I have presented reasonable alternatives to the patient's proposed care, treatment and services. The discussion I have done encompassed risks, benefits and side effects related to the alternatives and the risks related to not receiving the proposed care, treatment and services.     Electronically signed by Laura Villarreal MD on 2/14/2022 at 9:11 AM

## 2022-02-16 ENCOUNTER — TELEPHONE (OUTPATIENT)
Dept: ORTHOPEDIC SURGERY | Age: 32
End: 2022-02-16

## 2022-02-16 NOTE — TELEPHONE ENCOUNTER
=================[id number]=================  Taken 06:42:26 pm 02/15/22 Oper.  10  Dlvrd 06:44:05 pm 02/15/22 To CMD          ALPHA-NUMERIC PAGE          Terminal No. : 30        Pager Number : [facility]    Message : [phone]  [phone] SURGERY YESTERDAY AND I  T FEELS LIKE SPLIT IS SHIFTING    1990 MARQUES RICHEY

## 2022-02-16 NOTE — TELEPHONE ENCOUNTER
General Question     Subject: MEDICATION  Patient and /or Facility Request: PATIENT WANTS TO KNOW IF SHE CAN TAKE IBUPROFEN IN BETWEEN TAKING HER OXYCODONE PLEASE CALL TO ADVISE  Contact Number: [phone]

## 2022-02-23 ENCOUNTER — OFFICE VISIT (OUTPATIENT)
Dept: ORTHOPEDIC SURGERY | Age: 32
End: 2022-02-23
Payer: COMMERCIAL

## 2022-02-23 VITALS — WEIGHT: 195 LBS | HEIGHT: 69 IN | BODY MASS INDEX: 28.88 KG/M2

## 2022-02-23 DIAGNOSIS — S82.852D CLOSED TRIMALLEOLAR FRACTURE OF LEFT ANKLE WITH ROUTINE HEALING, SUBSEQUENT ENCOUNTER: Primary | ICD-10-CM

## 2022-02-23 DIAGNOSIS — Z98.890 S/P ORIF (OPEN REDUCTION INTERNAL FIXATION) FRACTURE: ICD-10-CM

## 2022-02-23 DIAGNOSIS — Z87.81 S/P ORIF (OPEN REDUCTION INTERNAL FIXATION) FRACTURE: ICD-10-CM

## 2022-02-23 PROCEDURE — 99024 POSTOP FOLLOW-UP VISIT: CPT | Performed by: ORTHOPAEDIC SURGERY

## 2022-02-23 PROCEDURE — L4361 PNEUMA/VAC WALK BOOT PRE OTS: HCPCS | Performed by: ORTHOPAEDIC SURGERY

## 2022-02-23 NOTE — PROGRESS NOTES
Tacho Restrepo  [id number]  February 23, 2022    Chief Complaint   Patient presents with    Post-Op Check     ORIF Left ankle 2/14/22. History: The patient is a 80-year-old female who is here for evaluation of her left ankle. She is now approximately 9 days status post open reduction and internal fixation of a comminuted left ankle fracture dislocation. The patient reports mild pain. She reports no issues with anesthesia. She has had some constipation which is resolving. She denies any numbness or tingling. Ht 5' 9\" (1.753 m)   Wt 195 lb (88.5 kg)   BMI 28.80 kg/m²     Physical:Ms. Tacho Restrepo appears well, she is in no apparent distress, she demonstrates appropriate mood & affect. She has moderate swelling. There is No evidence of DVT seen on physical exam.. She is neurovascularly intact distally. Range of motion is from 5 degrees of dorsiflexion to 10 degrees of plantarflexion. The incision is  clean, dry and intact and without erythema. X-rays: 3 views of the left ankle obtained and reviewed demonstrate excellent alignment. The mortise is intact. The hardware is in good position. The fractures are well aligned. Impression: status post open reduction and internal fixation of left ankle    Plan: At this time we will immobilize the left ankle in a tall boot. She will be NWB. The patient was encouraged to ice and elevate as much as possible. She will follow up in 4 weeks for repeat x-rays out of the boot or cast.  We will then likely progress the patient to weightbearing as tolerated in the boot. The patient will ultimately require syndesmotic screw removal 3 months postoperatively.

## 2022-03-01 ENCOUNTER — OFFICE VISIT (OUTPATIENT)
Dept: FAMILY MEDICINE CLINIC | Age: 32
End: 2022-03-01
Payer: COMMERCIAL

## 2022-03-01 VITALS
OXYGEN SATURATION: 99 % | BODY MASS INDEX: 28.14 KG/M2 | DIASTOLIC BLOOD PRESSURE: 60 MMHG | SYSTOLIC BLOOD PRESSURE: 120 MMHG | HEIGHT: 69 IN | HEART RATE: 105 BPM | WEIGHT: 190 LBS

## 2022-03-01 DIAGNOSIS — K21.9 GASTROESOPHAGEAL REFLUX DISEASE WITHOUT ESOPHAGITIS: ICD-10-CM

## 2022-03-01 DIAGNOSIS — K60.2 ANAL FISSURE: ICD-10-CM

## 2022-03-01 DIAGNOSIS — Z00.00 ANNUAL PHYSICAL EXAM: Primary | ICD-10-CM

## 2022-03-01 DIAGNOSIS — S82.892S CLOSED LEFT ANKLE FRACTURE, SEQUELA: ICD-10-CM

## 2022-03-01 DIAGNOSIS — R00.2 PALPITATIONS: ICD-10-CM

## 2022-03-01 PROCEDURE — 99385 PREV VISIT NEW AGE 18-39: CPT | Performed by: NURSE PRACTITIONER

## 2022-03-01 PROCEDURE — 99202 OFFICE O/P NEW SF 15 MIN: CPT | Performed by: NURSE PRACTITIONER

## 2022-03-01 PROCEDURE — G8484 FLU IMMUNIZE NO ADMIN: HCPCS | Performed by: NURSE PRACTITIONER

## 2022-03-01 PROCEDURE — G8419 CALC BMI OUT NRM PARAM NOF/U: HCPCS | Performed by: NURSE PRACTITIONER

## 2022-03-01 PROCEDURE — G8427 DOCREV CUR MEDS BY ELIG CLIN: HCPCS | Performed by: NURSE PRACTITIONER

## 2022-03-01 PROCEDURE — 4004F PT TOBACCO SCREEN RCVD TLK: CPT | Performed by: NURSE PRACTITIONER

## 2022-03-01 SDOH — ECONOMIC STABILITY: FOOD INSECURITY: WITHIN THE PAST 12 MONTHS, YOU WORRIED THAT YOUR FOOD WOULD RUN OUT BEFORE YOU GOT MONEY TO BUY MORE.: NEVER TRUE

## 2022-03-01 SDOH — ECONOMIC STABILITY: FOOD INSECURITY: WITHIN THE PAST 12 MONTHS, THE FOOD YOU BOUGHT JUST DIDN'T LAST AND YOU DIDN'T HAVE MONEY TO GET MORE.: NEVER TRUE

## 2022-03-01 SDOH — HEALTH STABILITY: PHYSICAL HEALTH
ON AVERAGE, HOW MANY DAYS PER WEEK DO YOU ENGAGE IN MODERATE TO STRENUOUS EXERCISE (LIKE A BRISK WALK)?: PATIENT DECLINED

## 2022-03-01 ASSESSMENT — ENCOUNTER SYMPTOMS
SORE THROAT: 0
COUGH: 0
WHEEZING: 0
SINUS PAIN: 0
DIARRHEA: 0
NAUSEA: 0
VOMITING: 0
CONSTIPATION: 0
CHEST TIGHTNESS: 0
SHORTNESS OF BREATH: 0
EYES NEGATIVE: 1
BLOOD IN STOOL: 0
ABDOMINAL PAIN: 0
SINUS PRESSURE: 0

## 2022-03-01 ASSESSMENT — SOCIAL DETERMINANTS OF HEALTH (SDOH)
WITHIN THE LAST YEAR, HAVE YOU BEEN AFRAID OF YOUR PARTNER OR EX-PARTNER?: NO
HOW HARD IS IT FOR YOU TO PAY FOR THE VERY BASICS LIKE FOOD, HOUSING, MEDICAL CARE, AND HEATING?: NOT HARD AT ALL
WITHIN THE LAST YEAR, HAVE YOU BEEN HUMILIATED OR EMOTIONALLY ABUSED IN OTHER WAYS BY YOUR PARTNER OR EX-PARTNER?: NO
WITHIN THE LAST YEAR, HAVE YOU BEEN KICKED, HIT, SLAPPED, OR OTHERWISE PHYSICALLY HURT BY YOUR PARTNER OR EX-PARTNER?: NO
WITHIN THE LAST YEAR, HAVE TO BEEN RAPED OR FORCED TO HAVE ANY KIND OF SEXUAL ACTIVITY BY YOUR PARTNER OR EX-PARTNER?: NO

## 2022-03-01 ASSESSMENT — PATIENT HEALTH QUESTIONNAIRE - PHQ9
7. TROUBLE CONCENTRATING ON THINGS, SUCH AS READING THE NEWSPAPER OR WATCHING TELEVISION: 0
SUM OF ALL RESPONSES TO PHQ QUESTIONS 1-9: 2
3. TROUBLE FALLING OR STAYING ASLEEP: 2
SUM OF ALL RESPONSES TO PHQ9 QUESTIONS 1 & 2: 0
5. POOR APPETITE OR OVEREATING: 0
SUM OF ALL RESPONSES TO PHQ QUESTIONS 1-9: 2
4. FEELING TIRED OR HAVING LITTLE ENERGY: 0
2. FEELING DOWN, DEPRESSED OR HOPELESS: 0
1. LITTLE INTEREST OR PLEASURE IN DOING THINGS: 0
SUM OF ALL RESPONSES TO PHQ QUESTIONS 1-9: 2
6. FEELING BAD ABOUT YOURSELF - OR THAT YOU ARE A FAILURE OR HAVE LET YOURSELF OR YOUR FAMILY DOWN: 0
8. MOVING OR SPEAKING SO SLOWLY THAT OTHER PEOPLE COULD HAVE NOTICED. OR THE OPPOSITE, BEING SO FIGETY OR RESTLESS THAT YOU HAVE BEEN MOVING AROUND A LOT MORE THAN USUAL: 0
10. IF YOU CHECKED OFF ANY PROBLEMS, HOW DIFFICULT HAVE THESE PROBLEMS MADE IT FOR YOU TO DO YOUR WORK, TAKE CARE OF THINGS AT HOME, OR GET ALONG WITH OTHER PEOPLE: 0
9. THOUGHTS THAT YOU WOULD BE BETTER OFF DEAD, OR OF HURTING YOURSELF: 0
SUM OF ALL RESPONSES TO PHQ QUESTIONS 1-9: 2

## 2022-03-01 NOTE — PROGRESS NOTES
3/1/2022    Roberto Carlos Ibarra (:  1990) is a 32 y.o. female, here for evaluation of the following medical concerns:    Chief Complaint   Patient presents with    New Patient     establish new pcp     Past medical, surgical, family and social history, as well as current medications and allergies, were reviewed and updated with the patient. Patient is here for annual physical.  She has no concerns today. She just moved from [place], [place]. 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 Had surgery with Dr. Chula Burroughs and is non-weight bearing. Has next appointment on 3/23. Anal Fissure:  Patient states she has been struggling with an anal fissure for several years. Admits to rectal bleeding with every bowel movement. She has been treated in the past in [place] with multiple treatments and nothing has helped. Palpitations:  Has had them for a long time. Each episode last a different amount of time. She has not had any work up for this. Does not drink caffeine. Episodes are random. Is not able to attribute it to anything. She denies chest pain, nausea, vomiting, diaphoresis, but admits to occasionally having dizziness. Sitting down will make it go away. GERD:  Used to take medication but has not for a long time. She does avoid foods that trigger this. She was taking Protonix but felt it was not as effective in the end. Using TUMS currently with good relief PRN. Review of Systems   Constitutional: Negative for chills, diaphoresis, fatigue and fever.    HENT: Negative for congestion, ear discharge, ear pain, sinus pressure, sinus pain and sore throat. Eyes: Negative. Respiratory: Negative for cough, chest tightness, shortness of breath and wheezing. Cardiovascular: Negative for chest pain, palpitations and leg swelling. Gastrointestinal: Negative for abdominal pain, blood in stool, constipation, diarrhea, nausea and vomiting. Endocrine: Negative. Genitourinary: Negative. Musculoskeletal: Negative. Skin: Negative. Neurological: Negative for dizziness, weakness and headaches. Psychiatric/Behavioral: Negative.       Prior to Visit Medications    Not on File        Allergies   Allergen Reactions    Hydrocodone Nausea And Vomiting    Oxycodone Nausea And Vomiting     Can take with anti-nausea medication       Past Medical History:   Diagnosis Date    Arthritis     Asthma     exercise induced    GERD (gastroesophageal reflux disease)     History of gestational diabetes     IBS (irritable bowel syndrome)     Non-alcoholic fatty liver disease     PONV (postoperative nausea and vomiting)        Past Surgical History:   Procedure Laterality Date    ANKLE FRACTURE SURGERY Left 2/14/2022    OPEN REDUCTION INTERNAL FIXATION LEFT ANKLE WITH SYNDESMOSIS SCREW FIXATION performed by Leroy Torrez MD at [address]     HYSTERECTOMY      SHOULDER SURGERY Left     ligament repair       Social History     Tobacco Use    Smoking status: Current Every Day Smoker     Packs/day: 0.50     Years: 18.00     Pack years: 9.00     Types: E-Cigarettes, Cigarettes    Smokeless tobacco: Never Used    Tobacco comment: quit cigarettes 4 years ago uses e cigarettes now   Vaping Use    Vaping Use: Every day    Substances: Nicotine, Flavoring    Devices: Disposable   Substance Use Topics    Alcohol use: Yes     Comment: rare    Drug use: Never        Family History   Problem Relation Age of Onset    High Blood Pressure Mother     Kidney Disease Father         kidney stones       Vitals:    03/01/22 1337   BP: 120/60 Pulse: 105   SpO2: 99%   Weight: 190 lb (86.2 kg)   Height: 5' 9\" (1.753 m)     Estimated body mass index is 28.06 kg/m² as calculated from the following:    Height as of this encounter: 5' 9\" (1.753 m). Weight as of this encounter: 190 lb (86.2 kg). Physical Exam  Vitals and nursing note reviewed. Constitutional:       General: She is awake. She is not in acute distress. Appearance: Normal appearance. She is well-developed and well-groomed. She is not ill-appearing. HENT:      Head: Normocephalic and atraumatic. Right Ear: Tympanic membrane, ear canal and external ear normal.      Left Ear: Tympanic membrane, ear canal and external ear normal.      Nose: Nose normal.      Mouth/Throat:      Lips: Pink. Mouth: Mucous membranes are moist.      Pharynx: Oropharynx is clear. Eyes:      Extraocular Movements: Extraocular movements intact. Conjunctiva/sclera: Conjunctivae normal.      Pupils: Pupils are equal, round, and reactive to light. Neck:      Thyroid: No thyroid mass, thyromegaly or thyroid tenderness. Vascular: No carotid bruit or JVD. Cardiovascular:      Rate and Rhythm: Normal rate and regular rhythm. Heart sounds: Normal heart sounds, S1 normal and S2 normal. No murmur heard. Pulmonary:      Effort: Pulmonary effort is normal.      Breath sounds: Normal breath sounds and air entry. Chest:   Breasts:      Right: No supraclavicular adenopathy. Left: No supraclavicular adenopathy. Abdominal:      General: Abdomen is flat. Bowel sounds are normal.      Palpations: Abdomen is soft. Musculoskeletal:         General: Normal range of motion. Cervical back: Normal range of motion and neck supple. Right lower leg: No edema. Left lower leg: No edema.       Comments: Left ankle is in a boot and patient using crutches   Lymphadenopathy:      Head:      Right side of head: No submental, submandibular, tonsillar, preauricular or posterior auricular adenopathy. Left side of head: No submental, submandibular, tonsillar, preauricular or posterior auricular adenopathy. Cervical: No cervical adenopathy. Upper Body:      Right upper body: No supraclavicular adenopathy. Left upper body: No supraclavicular adenopathy. Skin:     General: Skin is warm and dry. Capillary Refill: Capillary refill takes less than 2 seconds. Neurological:      General: No focal deficit present. Mental Status: She is alert and oriented to person, place, and time. GCS: GCS eye subscore is 4. GCS verbal subscore is 5. GCS motor subscore is 6. Psychiatric:         Attention and Perception: Attention normal.         Mood and Affect: Mood normal.         Speech: Speech normal.         Behavior: Behavior normal. Behavior is cooperative. Thought Content: Thought content normal.         Judgment: Judgment normal.     ASSESSMENT/PLAN:  1. Annual physical exam    2. Anal fissure  - Lakshmi Brannon MD (Colonoscopy), General Surgery, [facility]    3. Palpitations  If continues, will likely refer to cardiology  - EKG 12 lead; Future    4. Closed left ankle fracture, sequela  Managed by Dr. Gricelda Alberto    5. Gastroesophageal reflux disease without esophagitis  stable    Return in about 1 year (around 3/1/2023).

## 2022-03-01 NOTE — PATIENT INSTRUCTIONS
Call [phone] to schedule EKG    Patient Education        Well Visit, Ages 25 to 48: Care Instructions  Overview     Well visits can help you stay healthy. Your doctor has checked your overall health and may have suggested ways to take good care of yourself. Your doctor also may have recommended tests. At home, you can help prevent illness with healthy eating, regular exercise, and other steps. Follow-up care is a key part of your treatment and safety. Be sure to make and go to all appointments, and call your doctor if you are having problems. It's also a good idea to know your test results and keep a list of the medicines you take. How can you care for yourself at home? · Get screening tests that you and your doctor decide on. Screening helps find diseases before any symptoms appear. · Eat healthy foods. Choose fruits, vegetables, whole grains, protein, and low-fat dairy foods. Limit fat, especially saturated fat. Reduce salt in your diet. · Limit alcohol. If you are a man, have no more than 2 drinks a day or 14 drinks a week. If you are a woman, have no more than 1 drink a day or 7 drinks a week. · Get at least 30 minutes of physical activity on most days of the week. Walking is a good choice. You also may want to do other activities, such as running, swimming, cycling, or playing tennis or team sports. Discuss any changes in your exercise program with your doctor. · Reach and stay at a healthy weight. This will lower your risk for many problems, such as obesity, diabetes, heart disease, and high blood pressure. · Do not smoke or allow others to smoke around you. If you need help quitting, talk to your doctor about stop-smoking programs and medicines. These can increase your chances of quitting for good. · Care for your mental health. It is easy to get weighed down by worry and stress.  Learn strategies to manage stress, like deep breathing and mindfulness, and stay connected with your family and community. If you find you often feel sad or hopeless, talk with your doctor. Treatment can help. · Talk to your doctor about whether you have any risk factors for sexually transmitted infections (STIs). You can help prevent STIs if you wait to have sex with a new partner (or partners) until you've each been tested for STIs. It also helps if you use condoms (male or female condoms) and if you limit your sex partners to one person who only has sex with you. Vaccines are available for some STIs, such as HPV. · Use birth control if it's important to you to prevent pregnancy. Talk with your doctor about the choices available and what might be best for you. · If you think you may have a problem with alcohol or drug use, talk to your doctor. This includes prescription medicines (such as amphetamines and opioids) and illegal drugs (such as cocaine and methamphetamine). Your doctor can help you figure out what type of treatment is best for you. · Protect your skin from too much sun. When you're outdoors from 10 a.m. to 4 p.m., stay in the shade or cover up with clothing and a hat with a wide brim. Wear sunglasses that block UV rays. Even when it's cloudy, put broad-spectrum sunscreen (SPF 30 or higher) on any exposed skin. · See a dentist one or two times a year for checkups and to have your teeth cleaned. · Wear a seat belt in the car. When should you call for help? Watch closely for changes in your health, and be sure to contact your doctor if you have any problems or symptoms that concern you. Where can you learn more? Go to https://hai.healthCrowdfunder. org and sign in to your [employer] account. Enter P072 in the [facility] box to learn more about \"Well Visit, Ages 25 to 48: Care Instructions. \"     If you do not have an account, please click on the \"Sign Up Now\" link. Current as of: October 6, 2021               Content Version: 13.1  © 6821-3058 Healthwise, [facility].    Care instructions adapted under license by [facility] ([facility]). If you have questions about a medical condition or this instruction, always ask your healthcare professional. [address] any warranty or liability for your use of this information. Patient Education        Palpitations: Care Instructions  Your Care Instructions     Heart palpitations are the uncomfortable sensation that your heart is beating fast or irregularly. You might feel pounding or fluttering in your chest. It might feel like your heart is skipping a beat. Although palpitations may be caused by a heart problem, they also occur because of stress, fatigue, or use of alcohol, caffeine, or nicotine. Many medicines, including diet pills, antihistamines, decongestants, and some herbal products, can cause heart palpitations. Nearly everyone has palpitations from time to time. Depending on your symptoms, your doctor may need to do more tests to try to find the cause of your palpitations. Follow-up care is a key part of your treatment and safety. Be sure to make and go to all appointments, and call your doctor if you are having problems. It's also a good idea to know your test results and keep a list of the medicines you take. How can you care for yourself at home? · Avoid caffeine, nicotine, and excess alcohol. · Do not take illegal drugs, such as methamphetamines and cocaine. · Do not take weight loss or diet medicines unless you talk with your doctor first.  · Get plenty of sleep. · Do not overeat. · If you have palpitations again, take deep breaths and try to relax. This may slow a racing heart. · If you start to feel lightheaded, lie down to avoid injuries that might result if you pass out and fall down. · Keep a record of your palpitations and bring it to your next doctor's appointment. Write down:  ? The date and time. ? Your pulse. (If your heart is beating fast, it may be hard to count your pulse.)  ?  What you were doing when the palpitations started. ? How long the palpitations lasted. ? Any other symptoms. · If an activity causes palpitations, slow down or stop. Talk to your doctor before you do that activity again. · Take your medicines exactly as prescribed. Call your doctor if you think you are having a problem with your medicine. When should you call for help? Call 911 anytime you think you may need emergency care. For example, call if:    · You passed out (lost consciousness).     · You have symptoms of a heart attack. These may include:  ? Chest pain or pressure, or a strange feeling in the chest.  ? Sweating. ? Shortness of breath. ? Pain, pressure, or a strange feeling in the back, neck, jaw, or upper belly or in one or both shoulders or arms. ? Lightheadedness or sudden weakness. ? A fast or irregular heartbeat. After you call 911, the  may tell you to chew 1 adult-strength or 2 to 4 low-dose aspirin. Wait for an ambulance. Do not try to drive yourself.     · You have symptoms of a stroke. These may include:  ? Sudden numbness, tingling, weakness, or loss of movement in your face, arm, or leg, especially on only one side of your body. ? Sudden vision changes. ? Sudden trouble speaking. ? Sudden confusion or trouble understanding simple statements. ? Sudden problems with walking or balance. ? A sudden, severe headache that is different from past headaches. Call your doctor now or seek immediate medical care if:    · You have heart palpitations and:  ? Are dizzy or lightheaded, or you feel like you may faint. ? Have new or increased shortness of breath. Watch closely for changes in your health, and be sure to contact your doctor if:    · You continue to have heart palpitations. Where can you learn more? Go to https://chpecatarinoeb.[employer]. org and sign in to your [employer] account. Enter R508 in the [facility] box to learn more about \"Palpitations: Care Instructions. \" If you do not have an account, please click on the \"Sign Up Now\" link. Current as of: April 29, 2021               Content Version: 13.1  © 5708-7976 Healthwise, [employer]. Care instructions adapted under license by [facility] ([facility]). If you have questions about a medical condition or this instruction, always ask your healthcare professional. [address] any warranty or liability for your use of this information.

## 2022-03-23 ENCOUNTER — OFFICE VISIT (OUTPATIENT)
Dept: ORTHOPEDIC SURGERY | Age: 32
End: 2022-03-23

## 2022-03-23 VITALS — WEIGHT: 192 LBS | BODY MASS INDEX: 29.1 KG/M2 | HEIGHT: 68 IN

## 2022-03-23 DIAGNOSIS — Z98.890 S/P ORIF (OPEN REDUCTION INTERNAL FIXATION) FRACTURE: Primary | ICD-10-CM

## 2022-03-23 DIAGNOSIS — Z87.81 S/P ORIF (OPEN REDUCTION INTERNAL FIXATION) FRACTURE: Primary | ICD-10-CM

## 2022-03-23 DIAGNOSIS — S82.852D CLOSED TRIMALLEOLAR FRACTURE OF LEFT ANKLE WITH ROUTINE HEALING, SUBSEQUENT ENCOUNTER: ICD-10-CM

## 2022-03-23 DIAGNOSIS — M25.572 ACUTE LEFT ANKLE PAIN: ICD-10-CM

## 2022-03-23 PROCEDURE — 99024 POSTOP FOLLOW-UP VISIT: CPT | Performed by: ORTHOPAEDIC SURGERY

## 2022-03-23 NOTE — PROGRESS NOTES
Vilma Foy  [id number]  March 23, 2022    Chief Complaint   Patient presents with    Follow-up     Post Op Left ankle DOS 2/14/22       History: The patient is a 40-year-old female who is here for evaluation of her left ankle. She is now approximately 5 weeks status post open reduction and internal fixation of a severely comminuted left ankle fracture dislocation. She reports mild pain. She has been wearing the boot. She denies any numbness or tingling. The patient's  past medical history, medications, allergies,  family history, social history, and have been reviewed, and dated and are recorded in the chart. Pertinent items are noted in HPI. Review of systems reviewed from Pertinent History Form dated on 3/23/22 and available in the patient's chart under the Media tab. Vitals:  Ht 5' 8\" (1.727 m)   Wt 192 lb (87.1 kg)   BMI 29.19 kg/m²     Physical: On examination today, the patient is in no apparent distress. She is alert and oriented. She has mild swelling of the left ankle. There is no evidence of DVT. She is neurovascularly intact distally. Her incisions are well-healed. There is no evidence of drainage. We dorsiflex the left ankle to 10 degrees. She plantar flexes down to 15 degrees. X-rays: 3 views of the left ankle obtained in the office today were extensively reviewed. The ankle mortise is well reduced. The fibula fracture is well aligned and healing well. The medial malleolus fracture is healing well. The medial malleolus fragment was very small. The medial screw has migrated anteriorly. Impression: Status post open reduction and internal fixation of left ankle    Plan: At this time, we will continue with our current treatment. The patient will continue with the boot. We will get the patient into a physical therapy program.  She may begin to weight-bear as tolerated in the boot in approximately 10 days.   She will follow-up with me in approximately 5 weeks and we will reassess her then. At follow-up, 3 views of the left ankle will be obtained. We will then plan on removing the medial screw and the syndesmotic screw approximately 7 to 10 days after next visit.

## 2022-03-28 ENCOUNTER — HOSPITAL ENCOUNTER (OUTPATIENT)
Dept: PHYSICAL THERAPY | Age: 32
Setting detail: THERAPIES SERIES
Discharge: HOME | End: 2022-03-28
Payer: COMMERCIAL

## 2022-03-28 PROCEDURE — 97161 PT EVAL LOW COMPLEX 20 MIN: CPT

## 2022-03-28 PROCEDURE — 97110 THERAPEUTIC EXERCISES: CPT

## 2022-03-28 NOTE — FLOWSHEET NOTE
[facility]  Phone: [phone]   Fax: [phone]    Physical Therapy Treatment Note/ Progress Report:     Date:  3/28/2022    Patient Name:  Vilma Foy    :  1990  MRN: [id number]  Restrictions/Precautions:    Medical/Treatment Diagnosis Information:  Diagnosis: Z98.890, Z87.81 (ICD-10-CM) - S/P ORIF (open reduction internal fixation) [id number] (ICD-10-CM) - Closed trimalleolar fracture of left ankle with routine healing, subsequent encounter  Treatment Diagnosis: decreased ROM, strength, proprioception, balance impairments, reduced motor control, reduced participation in ADLs/IADLs, reduced tolerance for weight-bearing activities  Insurance/Certification information:  PT Insurance Information: [facility] [employer]--no auth required  Physician Information:  Referring Practitioner: Lauren Eastman MD  Plan of care signed (Y/N): []  Yes [x]  No     Date of Patient follow up with Physician:      Progress Report: []  Yes  [x]  No     Date Range for reporting period:  Beginning: 3/28  Ending:     Progress report due (10 Rx/or 30 days whichever is less): visit #10 or 0/10 (date)     Recertification due (POC duration/ or 90 days whichever is less): visit #24 or  (date)     Visit # Insurance Allowable Auth required?  Date Range    30 []  Yes  [x]  No        Units approved Units used Date Range          Latex Allergy:  [x]NO      []YES  Preferred Language for Healthcare:   [x]English       []other:    Functional Scale:           Date assessed:  TO physical FS primary measure score = 20; risk adjusted = 47  3/28    Pain level:  4-5/10     SUBJECTIVE:  See eval    OBJECTIVE: See eval      RESTRICTIONS/PRECAUTIONS: L ankle ORIF due to trimalleolar fx on ; WBAT with boot starting 2022    Exercises/Interventions:     Therapeutic Exercise (08899)  Resistance / level Sets/sec Reps Notes / Cues   Seated Calf Stretch  20\" 3 EOB PF/DF   1 20    EOB IV/EV  1 20    Towel Scrunches  1 20                                              Therapeutic Activities (03491)                                   Neuromuscular Re-ed (89450)                            Manual Intervention (75637)       Knee mobs/PROM       Tib/Fem Mobs       Patella Mobs       Ankle mobs                         Modalities:     Pt. Education:  -pt educated on diagnosis, prognosis and expectations for rehab  -all pt questions were answered    Home Exercise Program:    Access Code: [email or web address]  URL: [employer]/  Date: 03/28/2022  Prepared by: Clarisa Berrios    Exercises  Supine Single Leg Ankle Pumps - 3 x daily - 7 x weekly - 20 reps  Supine Ankle Inversion Eversion AROM - 3 x daily - 7 x weekly - 20 reps  Long Sitting Calf Stretch with Strap - 3 x daily - 7 x weekly - 4 reps - 15 hold  Seated Toe Towel Scrunches - 3 x daily - 7 x weekly - 20 reps      Therapeutic Exercise and NMR EXR  [x] (22014) Provided verbal/tactile cueing for activities related to strengthening, flexibility, endurance, ROM for improvements in LE, proximal hip, and core control with self care, mobility, lifting, ambulation.  [] (47274) Provided verbal/tactile cueing for activities related to improving balance, coordination, kinesthetic sense, posture, motor skill, proprioception  to assist with LE, proximal hip, and core control in self care, mobility, lifting, ambulation and eccentric single leg control.   [] (14211) Therapist is in constant attendance of 2 or more patients providing skilled therapy interventions, but not providing any significant amount of measurable one-on-one time to either patient, for improvements in LE, proximal hip, and core control in self care, mobility, lifting, ambulation and eccentric single leg control.      NMR and Therapeutic Activities:    [] (80346 or 02062) Provided verbal/tactile cueing for activities related to improving balance, coordination, kinesthetic TA x        [] [facility] Traction (61418)  [] Aquatic Therapy x      [] ES (un) (04623):   [] Home Management Training x  [] ES(attended) (89891)   [] Dry Needling 1-2 muscles (00072):  [] Dry Needling 3+ muscles (127825  [] Group:      [] Other:     GOALS:  Patient stated goal: return to stair climbing, walking and playing with kids. [] Progressing: [] Met: [] Not Met: [] Adjusted    Therapist goals for Patient:   Short Term Goals: To be achieved in: 2 weeks  1. Independent in HEP and progression per patient tolerance, in order to prevent re-injury. [] Progressing: [] Met: [] Not Met: [] Adjusted  2. Patient will have a decrease in pain to facilitate improvement in movement, function, and ADLs as indicated by Functional Deficits. [] Progressing: [] Met: [] Not Met: [] Adjusted    Long Term Goals: To be achieved in: 12 weeks  1. FOTO score of at least 60 to assist with reaching prior level of function. [] Progressing: [] Met: [] Not Met: [] Adjusted  2. Patient will demonstrate increased L ankle dorsiflexion AROM to 0 degrees  to allow for proper joint functioning and ability negotiate stairs safely. [] Progressing: [] Met: [] Not Met: [] Adjusted  3. Patient will demonstrate an increase in Strength to at least 4+/5 as well as good proximal hip strength and control to allow for proper functional mobility and safe ascent/descent of stairs. [] Progressing: [] Met: [] Not Met: [] Adjusted  4. Patient will return to functional activities including playing with her children without increased symptoms or restriction. [] Progressing: [] Met: [] Not Met: [] Adjusted  5. Pt will be able to maintain appropriate balance in L LE SLS for 15 seconds with no use of UE support to demonstrate safety in navigating stairs and other functional activities.    [] Progressing: [] Met: [] Not Met: [] Adjusted         Overall Progression Towards Functional goals/ Treatment Progress Update:  [] Patient is progressing as expected towards functional goals listed. [] Progression is slowed due to complexities/Impairments listed. [] Progression has been slowed due to co-morbidities. [x] Plan just implemented, too soon to assess goals progression <30days   [] Goals require adjustment due to lack of progress  [] Patient is not progressing as expected and requires additional follow up with physician  [] Other    Persisting Functional Limitations/Impairments:  []Sitting [x]Standing   [x]Walking [x]Stairs   [x]Transfers [x]ADLs   [x]Squatting/bending []Kneeling  [x]Housework []Job related tasks  [x]Driving [x]Sports/Recreation   [x]Sleeping []Other:    ASSESSMENT:  See eval    Treatment/Activity Tolerance:  [x] Pt able to complete treatment [] Patient limited by fatique  [] Patient limited by pain  [] Patient limited by other medical complications  [] Other:     Prognosis:  [x] Good [] Fair  [] Poor    Patient Requires Follow-up: [x] Yes  [] No    Return to Play:    [x]  N/A   []  Stage 1: Intro to Strength   []  Stage 2: Return to Run and Strength   []  Stage 3: Return to Jump and Strength   []  Stage 4: Dynamic Strength and Agility   []  Stage 5: Sport Specific Training     []  Ready to Return to Play, Meets All Above Stages   []  Not Ready for Return to Sports   Comments:            PLAN: See eval. PT 2x / week for 12 weeks. [] Continue per plan of care [] Alter current plan (see comments)  [x] Plan of care initiated [] Hold pending MD visit [] Discharge    Electronically signed by: Saad Smith PT, DPT      Note: If patient does not return for scheduled/ recommended follow up visits, this note will serve as a discharge from care along with most recent update on progress.

## 2022-03-28 NOTE — PLAN OF CARE
[facility], [address] [facility], [address]  Phone: [phone]   Fax: [phone]                                                       Physical Therapy Certification    Dear Referring Practitioner: Tessie Johnson MD,    We had the pleasure of evaluating the following patient for physical therapy services at [address] [place] [address]. A summary of our findings can be found in the initial assessment below. This includes our plan of care. If you have any questions or concerns regarding these findings, please do not hesitate to contact me at the office phone number checked above.   Thank you for the referral.       Physician Signature:_______________________________Date:__________________  By signing above (or electronic signature), therapists plan is approved by physician      Patient: Pleas Leventhal   : 1990   MRN: [id number]  Referring Physician: Referring Practitioner: Tessie Johnson MD      Evaluation Date: 3/28/2022      Medical Diagnosis Information:  Diagnosis: Z98.890, Z87.81 (ICD-10-CM) - S/P ORIF (open reduction internal fixation) [id number] (ICD-10-CM) - Closed trimalleolar fracture of left ankle with routine healing, subsequent encounter   Treatment Diagnosis: decreased ROM, strength, proprioception, balance impairments, reduced motor control, reduced participation in ADLs/IADLs, reduced tolerance for weight-bearing activities                                         Insurance information: PT Insurance Information: [facility] [employer]--no auth required     Precautions/ Contra-indications: L ankle ORIF 22, OA   Latex Allergy:  [x]NO      []YES  Preferred Language for Healthcare:   [x]English       []Other:    C-SSRS Triggered by Intake questionnaire (Past 2 wk assessment ):   [x] No, Questionnaire did not trigger screening.   [] Yes, Patient intake triggered C-SSRS Screening     [] Completed, no further action required. [] Completed, PCP notified via Epic    SUBJECTIVE: Patient stated complaint: Pt reports that she slipped and fell on ice in February resulting in a L ankle tri-malleolar fracture. She reports that she is not taking medication other than OTC Ibprofen to manage her pain. She reports most pain when the leg is hanging down and gets relief when it is elevated and out of the boot. She has 2 small children that she takes care of and this injury has altered her ability to do that independently. She denies any numbness but does report tingling at the base of the toes. Pt reports that she did fall yesterday getting out of the shower, but reports she put minimal weight on the leg as she knows she stepped down on it but then realized what she had done and pulled it out. She reports no increase in pain since the fall and reports that she thinks she is able to move it more than prior to the fall. Relevant Medical History: OA  Functional Outcome: FOTO physical FS primary measure score = 27; Risk adjusted = 47    Pain Scale: 3-4/10  Easing factors: Taking it out of boot and elevating, icing    Provocative factors: having it down/gravity    Type: [x]Constant   []Intermittent  []Radiating []Localized []other:     Numbness/Tingling: Tingly on the ball of her foot/base of toes, no numbness     Occupation/School: Student--sits at computer     Living Status/Prior Level of Function:Prior to this injury / incident, pt was independent with ADLs and IADLs. OBJECTIVE:   Palpation: TTP over posterior lower leg and across the dorsal surface of the foot. Minimal tenderness noted over incisions. Functional Mobility/Transfers: Use of Bilateral crutches for ambulation with NWB at this point. Progression to WBAT in 5 days with B crutches.      Posture:     Bandages/Dressings/Incisions: clean, dry, well-healing    Gait: (include devices/WB status) Use of B axillary crutches, NWB upon initial eval       PROM AROM    L R L R   Hip Flexion       Hip Abduction       Hip ER       Hip IR       Knee Flexion       Knee Extension       Dorsiflexion   Lacking 30 from neutral 5   Plantarflexion     40 60   Inversion    20 35   Eversion    10 20       Strength (0-5) / Myotomes  Deferred due to post-op status Left Right   Hip Flexion - supine     Hip Flexion - seated (L1-2)     Hip Abduction     Hip Adduction     Hip ER     Hip IR     Quads (L2-4)     Hamstrings     Ankle Dorsiflexion (L4-5)     Ankle Plantarflexion (S1-2)     Ankle Inversion     Ankle Eversion (S1-2)     Great Toe Extension (L5)          Flexibility     Hamstrings (90/90)     ITB (Roger)     Quads (Ely's)     Hip Flexor (James)          Girth (cm)     Mid patella     Suprapatellar     Figure 8 53.5cm 50.5cm   Transmalleolar     Metatarsal Heads         Joint mobility:    []Normal    [x]Hypo   []Hyper      Balance: deferred due to NWB status                          [x] Patient history, allergies, meds reviewed. Medical chart reviewed. See intake form. Review Of Systems (ROS):  [x]Performed Review of systems (Integumentary, CardioPulmonary, Neurological) by intake and observation. Intake form has been scanned into medical record. Patient has been instructed to contact their primary care physician regarding ROS issues if not already being addressed at this time.       Co-morbidities/Complexities (which will affect course of rehabilitation):   []None        []Hx of COVID   Arthritic conditions   []Rheumatoid arthritis (M05.9)  [x]Osteoarthritis (M19.91)  []Gout   Cardiovascular conditions   []Hypertension (I10)  []Hyperlipidemia (E78.5)  []Angina pectoris (I20)  []Atherosclerosis (I70)  []Pacemaker  []Hx of CABG/stent/  cardiac surgeries   Musculoskeletal conditions   []Disc pathology   []Congenital spine pathologies   []Osteoporosis (M81.8)  []Osteopenia (M85.8)  []Scoliosis       Endocrine conditions []Hypothyroid (E03.9)  []Hyperthyroid Gastrointestinal conditions   []Constipation (O16.48)   Metabolic conditions   []Morbid obesity (E66.01)  []Diabetes type 1(E10.65) or 2 (E11.65)   []Neuropathy (G60.9)     Cardio/Pulmonary conditions   []Asthma (J45)  []Coughing   []COPD (J44.9)  []CHF  []A-fib   Psychological Disorders  [x]Anxiety (F41.9)  []Depression (F32.9)   []Other:   Developmental Disorders  []Autism (F84.0)  []CP (G80)  []Down Syndrome (Q90.9)  []Developmental delay     Neurological conditions  []Prior Stroke (I69.30)  []Parkinson's (G20)  []Encephalopathy (G93.40)  []MS (G35)  []Post-polio (G14)  []SCI  []TBI  []ALS Other conditions  []Fibromyalgia (M79.7)  []Vertigo  []Syncope  []Kidney Failure  []Cancer      []currently undergoing                treatment  []Pregnancy  []Incontinence   Prior surgeries  []involved limb  []previous spinal surgery  [] section birth  []hysterectomy  []bowel / bladder surgery  []other relevant surgeries   []Other:              Barriers to/and or personal factors that will affect rehab potential:              [x]Age  []Sex    [x]Smoker              []Motivation/Lack of Motivation                        [x]Co-Morbidities              []Cognitive Function, education/learning barriers              []Environmental, home barriers              []profession/work barriers  [x]past PT/medical experience  []other:  Justification: history or previous major surgeries, OA related to her young age      Falls Risk Assessment (30 days):   [x] Falls Risk assessed and no intervention required. [] Falls Risk assessed and Patient requires intervention due to being higher risk   TUG score (>12s at risk):     [] Falls education provided, including        ASSESSMENT: Akiko Chery is a 32year-old female who presents to OP PT s/p L ankle tri-malleolar ORIF following a slip on ice back in 2022.  She demonstrates lack of ROM, strength, muscle flexibility, motor control, balance, proprioception and gait mechanics as she currently is NWB with B axillary crutches. Per MD orders, she will transition to [facility] in 5 days (4/2/2022) and pt was educated that crutches should still be used until comfortable WBAT with boot on. Pt demonstrates some swelling post-operatively but incisions are clean, dry and well healing. Pt was educated on importance of continued icing, elevation for swelling reduction and performing provided HEP to assist with maintenance of ROM outside of PT. Pt will continue to benefit from PT interventions in order to maximize strength, ROM, weight bearing tolerance and balance to allow for improved participation in ADLs and taking care of her 2 young children independently.        Functional Impairments:     []Noted lumbar/proximal hip/LE joint hypomobility   [x]Decreased LE functional ROM   [x]Decreased core/proximal hip strength and neuromuscular control   [x]Decreased LE functional strength   [x]Reduced balance/proprioceptive control   []other:      Functional Activity Limitations (from functional questionnaire and intake)   [x]Reduced ability to tolerate prolonged functional positions   [x]Reduced ability or difficulty with changes of positions or transfers between positions   []Reduced ability to maintain good posture and demonstrate good body mechanics with sitting, bending, and lifting   [x]Reduced ability to sleep   [x] Reduced ability or tolerance with driving and/or computer work   [x]Reduced ability to perform lifting, carrying tasks   [x]Reduced ability to squat   []Reduced ability to forward bend   [x]Reduced ability to ambulate prolonged functional periods/distances/surfaces   [x]Reduced ability to ascend/descend stairs   [x]Reduced ability to run, hop, cut or jump   []other:    Participation Restrictions   [x]Reduced participation in self care activities   [x]Reduced participation in home management activities   [x]Reduced participation in work activities   [x]Reduced participation in social activities. [x]Reduced participation in sport/recreation activities. Classification :    [x]Signs/symptoms consistent with post-surgical status including decreased ROM, strength and function. []Signs/symptoms consistent with joint sprain/strain   []Signs/symptoms consistent with patella-femoral syndrome   []Signs/symptoms consistent with knee OA/hip OA   []Signs/symptoms consistent with internal derangement of knee/Hip   []Signs/symptoms consistent with functional hip weakness/NMR control      []Signs/symptoms consistent with tendinitis/tendinosis    []signs/symptoms consistent with pathology which may benefit from Dry needling      []other:      Prognosis/Rehab Potential:      []Excellent   [x]Good    []Fair   []Poor    Tolerance of evaluation/treatment:    []Excellent   [x]Good    []Fair   []Poor    Physical Therapy Evaluation Complexity Justification  [x] A history of present problem with:  [] no personal factors and/or comorbidities that impact the plan of care;  [x]1-2 personal factors and/or comorbidities that impact the plan of care  []3 personal factors and/or comorbidities that impact the plan of care  [x] An examination of body systems using standardized tests and measures addressing any of the following: body structures and functions (impairments), activity limitations, and/or participation restrictions;:  [x] a total of 1-2 or more elements   [] a total of 3 or more elements   [] a total of 4 or more elements   [x] A clinical presentation with:  [x] stable and/or uncomplicated characteristics   [] evolving clinical presentation with changing characteristics  [] unstable and unpredictable characteristics;   [x] Clinical decision making of [x] Low, [] moderate, [] high complexity using standardized patient assessment instrument and/or measurable assessment of functional outcome.     [x] EVAL (LOW) 00450 (typically 15 minutes face-to-face)  [] EVAL (MOD) 44080 (typically 30 minutes face-to-face)  [] EVAL (HIGH) 34989 (typically 45 minutes face-to-face)  [] RE-EVAL     PLAN:   Frequency/Duration:  2 days per week for 12 Weeks:  Interventions:  [x]  Therapeutic exercise including: strength training, ROM, for Lower extremity and core   [x]  NMR activation and proprioception for LE, Glutes and Core   [x]  Manual therapy as indicated for LE, Hip and spine to include: Dry Needling/IASTM, STM, PROM, Gr I-IV mobilizations, manipulation. [x] Modalities as needed that may include: thermal agents, E-stim, Biofeedback, US, iontophoresis as indicated  [x] Patient education on joint protection, postural re-education, activity modification, progression of HEP. HEP instruction: Written HEP instructions provided and reviewed. GOALS:  Patient stated goal: return to stair climbing, walking and playing with kids. [] Progressing: [] Met: [] Not Met: [] Adjusted    Therapist goals for Patient:   Short Term Goals: To be achieved in: 2 weeks  1. Independent in HEP and progression per patient tolerance, in order to prevent re-injury. [] Progressing: [] Met: [] Not Met: [] Adjusted  2. Patient will have a decrease in pain to facilitate improvement in movement, function, and ADLs as indicated by Functional Deficits. [] Progressing: [] Met: [] Not Met: [] Adjusted    Long Term Goals: To be achieved in: 12 weeks  1. FOTO score of at least 60 to assist with reaching prior level of function. [] Progressing: [] Met: [] Not Met: [] Adjusted  2. Patient will demonstrate increased L ankle dorsiflexion AROM to 0 degrees  to allow for proper joint functioning and ability negotiate stairs safely. [] Progressing: [] Met: [] Not Met: [] Adjusted  3. Patient will demonstrate an increase in Strength to at least 4+/5 as well as good proximal hip strength and control to allow for proper functional mobility and safe ascent/descent of stairs. [] Progressing: [] Met: [] Not Met: [] Adjusted  4.  Patient will return to functional activities including playing with her children without increased symptoms or restriction. [] Progressing: [] Met: [] Not Met: [] Adjusted  5. Pt will be able to maintain appropriate balance in L LE SLS for 15 seconds with no use of UE support to demonstrate safety in navigating stairs and other functional activities. [] Progressing: [] Met: [] Not Met: [] Adjusted     Electronically signed by:        Maikol Farley, PT DPT, OMT-C  Therapist was present, directed the patient's care, made skilled judgement, and was responsible for assessment and treatment of the patient.     Sacha Hernandez, SPT

## 2022-03-30 ENCOUNTER — HOSPITAL ENCOUNTER (OUTPATIENT)
Dept: PHYSICAL THERAPY | Age: 32
Setting detail: THERAPIES SERIES
Discharge: HOME | End: 2022-03-30
Payer: COMMERCIAL

## 2022-03-30 PROCEDURE — 97112 NEUROMUSCULAR REEDUCATION: CPT

## 2022-03-30 PROCEDURE — 97110 THERAPEUTIC EXERCISES: CPT

## 2022-03-30 NOTE — FLOWSHEET NOTE
Arjun Renee  Phone: [phone]   Fax: [phone]    Physical Therapy Treatment Note/ Progress Report:     Date:  3/30/2022    Patient Name:  Isaac Jeong    :  1990  MRN: [id number]  Restrictions/Precautions:    Medical/Treatment Diagnosis Information:  Diagnosis: Z98.890, Z87.81 (ICD-10-CM) - S/P ORIF (open reduction internal fixation) [id number] (ICD-10-CM) - Closed trimalleolar fracture of left ankle with routine healing, subsequent encounter  Treatment Diagnosis: decreased ROM, strength, proprioception, balance impairments, reduced motor control, reduced participation in ADLs/IADLs, reduced tolerance for weight-bearing activities  Insurance/Certification information:  PT Insurance Information: [facility] [employer]--no auth required  Physician Information:  Referring Practitioner: Modesta Murphy MD  Plan of care signed (Y/N): []  Yes [x]  No     Date of Patient follow up with Physician:      Progress Report: []  Yes  [x]  No     Date Range for reporting period:  Beginning: 3/28  Ending:     Progress report due (10 Rx/or 30 days whichever is less): visit #10 or 3/20 (date)     Recertification due (POC duration/ or 90 days whichever is less): visit #24 or  (date)     Visit # Insurance Allowable Auth required? Date Range   2 30 []  Yes  [x]  No        Units approved Units used Date Range          Latex Allergy:  [x]NO      []YES  Preferred Language for Healthcare:   [x]English       []other:    Functional Scale:           Date assessed:  TO physical FS primary measure score = 20; risk adjusted = 47  3/28    Pain level:  2/10 more discomfort than pain     SUBJECTIVE: Pt reports that she is feeling pretty good today, and is compliant with her HEP.        OBJECTIVE:           RESTRICTIONS/PRECAUTIONS: L ankle ORIF due to trimalleolar fx on ; WBAT with boot starting 2022    Exercises/Interventions:     Therapeutic Exercise (88089)  Resistance / level Sets/sec Reps Notes / Cues   Seated Calf Stretch  30\" 5    EOB PF/DF   1 20    EOB IV/EV  1 20    Towel Scrunches  1 20    Seated HR/TR  3 10 each 3/30                                      Therapeutic Activities (17672)                                   Neuromuscular Re-ed (95888)       Rocker Board PF/DF/IV/EV  -double leg   -L only Square 2 20 each 3/30   Seated Heel Slides  5\" hold 20 3/30          Manual Intervention (07781)       Knee mobs/PROM       Tib/Fem Mobs       Patella Mobs       Ankle mobs (distraction, PROM, calcaneal IV/EV) Grade 1-2 15'                       Modalities:     Pt. Education:  -pt educated on diagnosis, prognosis and expectations for rehab  -all pt questions were answered    Home Exercise Program:    Access Code: [email or web address]  URL: [email or web address]  Date: 03/28/2022  Prepared by: Summer Espinoza    Exercises  Supine Single Leg Ankle Pumps - 3 x daily - 7 x weekly - 20 reps  Supine Ankle Inversion Eversion AROM - 3 x daily - 7 x weekly - 20 reps  Long Sitting Calf Stretch with Strap - 3 x daily - 7 x weekly - 4 reps - 15 hold  Seated Toe Towel Scrunches - 3 x daily - 7 x weekly - 20 reps      Therapeutic Exercise and NMR EXR  [x] (54138) Provided verbal/tactile cueing for activities related to strengthening, flexibility, endurance, ROM for improvements in LE, proximal hip, and core control with self care, mobility, lifting, ambulation.  [] (20860) Provided verbal/tactile cueing for activities related to improving balance, coordination, kinesthetic sense, posture, motor skill, proprioception  to assist with LE, proximal hip, and core control in self care, mobility, lifting, ambulation and eccentric single leg control.   [] (03792) Therapist is in constant attendance of 2 or more patients providing skilled therapy interventions, but not providing any significant amount of measurable one-on-one time to either patient, for improvements in LE, proximal hip, and core control in self care, mobility, lifting, ambulation and eccentric single leg control. NMR and Therapeutic Activities:    [x] (29219 or 27475) Provided verbal/tactile cueing for activities related to improving balance, coordination, kinesthetic sense, posture, motor skill, proprioception and motor activation to allow for proper function of core, proximal hip and LE with self care and ADLs  [] (39774) Gait Re-education- Provided training and instruction to the patient for proper LE, core and proximal hip recruitment and positioning and eccentric body weight control with ambulation re-education including up and down stairs     Home Exercise Program:    [] (37781) Reviewed/Progressed HEP activities related to strengthening, flexibility, endurance, ROM of core, proximal hip and LE for functional self-care, mobility, lifting and ambulation/stair navigation   [] (67058)Reviewed/Progressed HEP activities related to improving balance, coordination, kinesthetic sense, posture, motor skill, proprioception of core, proximal hip and LE for self care, mobility, lifting, and ambulation/stair navigation      Manual Treatments:  PROM / STM / Oscillations-Mobs:  G-I, II, III, IV (PA's, Inf., Post.)  [x] (59090) Provided manual therapy to mobilize LE, proximal hip and/or LS spine soft tissue/joints for the purpose of modulating pain, promoting relaxation,  increasing ROM, reducing/eliminating soft tissue swelling/inflammation/restriction, improving soft tissue extensibility and allowing for proper ROM for normal function with self care, mobility, lifting and ambulation. Modalities:  [] (39940) Vasopneumatic compression: Utilized vasopneumatic compression to decrease edema / swelling for the purpose of improving mobility and quad tone / recruitment which will allow for increased overall function including but not limited to self-care, transfers, ambulation, and ascending / descending stairs. Charges:  Timed Code Treatment Minutes: 40   Total Treatment Minutes: 40     [] EVAL - LOW (68487)   [] EVAL - MOD (76809)  [] EVAL - HIGH (25539)  [] RE-EVAL (17373)  [x] W[place](70028) x 1      [] Ionto  [x] NMR (46670) x 2       [] Vaso  [] Manual (75216) x       [] Ultrasound  [] TA x        [] Mech Traction (72185)  [] Aquatic Therapy x      [] ES (un) (85042):   [] Home Management Training x  [] ES(attended) (90408)   [] Dry Needling 1-2 muscles (53535):  [] Dry Needling 3+ muscles (884101  [] Group:      [] Other:     GOALS:  Patient stated goal: return to stair climbing, walking and playing with kids. [] Progressing: [] Met: [] Not Met: [] Adjusted    Therapist goals for Patient:   Short Term Goals: To be achieved in: 2 weeks  1. Independent in HEP and progression per patient tolerance, in order to prevent re-injury. [] Progressing: [] Met: [] Not Met: [] Adjusted  2. Patient will have a decrease in pain to facilitate improvement in movement, function, and ADLs as indicated by Functional Deficits. [] Progressing: [] Met: [] Not Met: [] Adjusted    Long Term Goals: To be achieved in: 12 weeks  1. FOTO score of at least 60 to assist with reaching prior level of function. [] Progressing: [] Met: [] Not Met: [] Adjusted  2. Patient will demonstrate increased L ankle dorsiflexion AROM to 0 degrees  to allow for proper joint functioning and ability negotiate stairs safely. [] Progressing: [] Met: [] Not Met: [] Adjusted  3. Patient will demonstrate an increase in Strength to at least 4+/5 as well as good proximal hip strength and control to allow for proper functional mobility and safe ascent/descent of stairs. [] Progressing: [] Met: [] Not Met: [] Adjusted  4. Patient will return to functional activities including playing with her children without increased symptoms or restriction. [] Progressing: [] Met: [] Not Met: [] Adjusted  5.  Pt will be able to maintain appropriate balance in L LE SLS for 15 seconds with no use of UE support to demonstrate safety in navigating stairs and other functional activities. [] Progressing: [] Met: [] Not Met: [] Adjusted         Overall Progression Towards Functional goals/ Treatment Progress Update:  [] Patient is progressing as expected towards functional goals listed. [] Progression is slowed due to complexities/Impairments listed. [] Progression has been slowed due to co-morbidities. [x] Plan just implemented, too soon to assess goals progression <30days   [] Goals require adjustment due to lack of progress  [] Patient is not progressing as expected and requires additional follow up with physician  [] Other    Persisting Functional Limitations/Impairments:  []Sitting [x]Standing   [x]Walking [x]Stairs   [x]Transfers [x]ADLs   [x]Squatting/bending []Kneeling  [x]Housework []Job related tasks  [x]Driving [x]Sports/Recreation   [x]Sleeping []Other:    ASSESSMENT:  Pt tolerated PT interventions well today and demonstrates a positive response to manual techniques as she reports that her ankle feels looser and she is able to move it around a little more. She reports continued tightness in the back of the heel/Achilles mostly. Pt demonstrates lack of ROM and strength of the ankle and due to NWB status we did lots of table and seated exercises for mobility. Pt requires verbal cues for Rocker board exercises to keep the knee stationary and the heel down to assist with improving ROM. Pt will continue to benefit from PT interventions in order to maximize strength, ROM, motor control and proprioception to assist with ambulation/gait mechanics and toleration of independent ADLs and improved functional mobility.         Treatment/Activity Tolerance:  [x] Pt able to complete treatment [] Patient limited by fatique  [] Patient limited by pain  [] Patient limited by other medical complications  [] Other:     Prognosis:  [x] Good [] Fair  [] Poor    Patient Requires Follow-up: [x] Yes  [] No    Return to Play:    [x]  N/A   []  Stage 1: Intro to Strength   []  Stage 2: Return to Run and Strength   []  Stage 3: Return to Jump and Strength   []  Stage 4: Dynamic Strength and Agility   []  Stage 5: Sport Specific Training     []  Ready to Return to Play, Meets All Above Stages   []  Not Ready for Return to Sports   Comments:            PLAN: See eval. PT 2x / week for 12 weeks. [] Continue per plan of care [] Alter current plan (see comments)  [x] Plan of care initiated [] Hold pending MD visit [] Discharge    Electronically signed by: Elida Pineda PT, DPT, OMT-C  Therapist was present, directed the patient's care, made skilled judgement, and was responsible for assessment and treatment of the patient. Elissa Cheung, SPT       Note: If patient does not return for scheduled/ recommended follow up visits, this note will serve as a discharge from care along with most recent update on progress.

## 2022-04-04 ENCOUNTER — HOSPITAL ENCOUNTER (OUTPATIENT)
Dept: PHYSICAL THERAPY | Age: 32
Setting detail: THERAPIES SERIES
Discharge: HOME | End: 2022-04-04
Payer: COMMERCIAL

## 2022-04-04 PROCEDURE — 97140 MANUAL THERAPY 1/> REGIONS: CPT

## 2022-04-04 PROCEDURE — 97530 THERAPEUTIC ACTIVITIES: CPT

## 2022-04-04 PROCEDURE — 97016 VASOPNEUMATIC DEVICE THERAPY: CPT

## 2022-04-04 PROCEDURE — 97110 THERAPEUTIC EXERCISES: CPT

## 2022-04-04 NOTE — FLOWSHEET NOTE
KellyOsbaldoArjun  Phone: [phone]   Fax: [phone]    Physical Therapy Treatment Note/ Progress Report:     Date:  2022    Patient Name:  Alonzo Brewer    :  1990  MRN: [id number]  Restrictions/Precautions:    Medical/Treatment Diagnosis Information:  Diagnosis: Z98.890, Z87.81 (ICD-10-CM) - S/P ORIF (open reduction internal fixation) [id number] (ICD-10-CM) - Closed trimalleolar fracture of left ankle with routine healing, subsequent encounter  Treatment Diagnosis: decreased ROM, strength, proprioception, balance impairments, reduced motor control, reduced participation in ADLs/IADLs, reduced tolerance for weight-bearing activities  Insurance/Certification information:  PT Insurance Information: [facility] [employer]--no auth required  Physician Information:  Referring Practitioner: Tara Mg MD  Plan of care signed (Y/N): []  Yes [x]  No     Date of Patient follow up with Physician:      Progress Report: []  Yes  [x]  No     Date Range for reporting period:  Beginning: 3/28  Ending:     Progress report due (10 Rx/or 30 days whichever is less): visit #10 or  (date)     Recertification due (POC duration/ or 90 days whichever is less): visit #24 or  (date)     Visit # Insurance Allowable Auth required? Date Range   3 30 []  Yes  [x]  No        Units approved Units used Date Range          Latex Allergy:  [x]NO      []YES  Preferred Language for Healthcare:   [x]English       []other:    Functional Scale:           Date assessed:  TO physical FS primary measure score = 20; risk adjusted = 47  3/28    Pain level:  4/10      SUBJECTIVE: Pt reports her ankle is a little sore today and says HEP is going good. Pt reports she has not walked on foot yet and was waiting till today to do so.        OBJECTIVE:           RESTRICTIONS/PRECAUTIONS: L ankle ORIF due to trimalleolar fx on ; WBAT with boot starting allow for increased overall function including but not limited to self-care, transfers, ambulation, and ascending / descending stairs. Charges:  Timed Code Treatment Minutes: 48   Total Treatment Minutes: 63     [] EVAL - LOW (55566)   [] EVAL - MOD (32971)  [] EVAL - HIGH (47094)  [] RE-EVAL (95604)  [x] FK(33754) x   1    [] Ionto  [x] NMR (99599) x   1     [x] Vaso  [x] Manual (37415) x   1    [] Ultrasound  [] TA x        [] Mech Traction (23314)  [] Aquatic Therapy x      [] ES (un) (10307):   [] Home Management Training x  [] ES(attended) (03762)   [] Dry Needling 1-2 muscles (50715):  [] Dry Needling 3+ muscles (948363  [] Group:      [] Other:     GOALS:  Patient stated goal: return to stair climbing, walking and playing with kids. [] Progressing: [] Met: [] Not Met: [] Adjusted    Therapist goals for Patient:   Short Term Goals: To be achieved in: 2 weeks  1. Independent in HEP and progression per patient tolerance, in order to prevent re-injury. [] Progressing: [] Met: [] Not Met: [] Adjusted  2. Patient will have a decrease in pain to facilitate improvement in movement, function, and ADLs as indicated by Functional Deficits. [] Progressing: [] Met: [] Not Met: [] Adjusted    Long Term Goals: To be achieved in: 12 weeks  1. FOTO score of at least 60 to assist with reaching prior level of function. [] Progressing: [] Met: [] Not Met: [] Adjusted  2. Patient will demonstrate increased L ankle dorsiflexion AROM to 0 degrees  to allow for proper joint functioning and ability negotiate stairs safely. [] Progressing: [] Met: [] Not Met: [] Adjusted  3. Patient will demonstrate an increase in Strength to at least 4+/5 as well as good proximal hip strength and control to allow for proper functional mobility and safe ascent/descent of stairs. [] Progressing: [] Met: [] Not Met: [] Adjusted  4.  Patient will return to functional activities including playing with her children without increased symptoms or restriction. [] Progressing: [] Met: [] Not Met: [] Adjusted  5. Pt will be able to maintain appropriate balance in L LE SLS for 15 seconds with no use of UE support to demonstrate safety in navigating stairs and other functional activities. [] Progressing: [] Met: [] Not Met: [] Adjusted         Overall Progression Towards Functional goals/ Treatment Progress Update:  [] Patient is progressing as expected towards functional goals listed. [] Progression is slowed due to complexities/Impairments listed. [] Progression has been slowed due to co-morbidities. [x] Plan just implemented, too soon to assess goals progression <30days   [] Goals require adjustment due to lack of progress  [] Patient is not progressing as expected and requires additional follow up with physician  [] Other    Persisting Functional Limitations/Impairments:  []Sitting [x]Standing   [x]Walking [x]Stairs   [x]Transfers [x]ADLs   [x]Squatting/bending []Kneeling  [x]Housework []Job related tasks  [x]Driving [x]Sports/Recreation   [x]Sleeping []Other:    ASSESSMENT: Pt completed this therapy session with continued lack of ROM and strength of the ankle and tightness in the calf muscle. Pt had tightness with AP forefoot mobilizations and has noticeable swelling throughout ankle. Lymph and scar massage initiated this session to move fluid out of foot and ankle joint and to loosen up scar for ROM. Pt introduced and given compression stocking this session to reduce swelling and was instructed to wear during the day and take off in the evening before bed. Knee and hip strengthening will be initiated next session for ambulation and gait mechanics due to patient being able to WBAT while in the boot. Pt instructed how to ambulate with WBAT precautions and crutches and pt responded well and was able to tolerate weight on boot and was able to reciprocate correct gait pattern.  Pt will continue to benefit from skilled therapy to in order to maximize strength and ROM of the ankle, reduce swelling, and reduce pain to assist with ambulation, gait mechanics, and functional activity. Treatment/Activity Tolerance:  [x] Pt able to complete treatment [] Patient limited by fatique  [] Patient limited by pain  [] Patient limited by other medical complications  [] Other:     Prognosis:  [x] Good [] Fair  [] Poor    Patient Requires Follow-up: [x] Yes  [] No    Return to Play:    [x]  N/A   []  Stage 1: Intro to Strength   []  Stage 2: Return to Run and Strength   []  Stage 3: Return to Jump and Strength   []  Stage 4: Dynamic Strength and Agility   []  Stage 5: Sport Specific Training     []  Ready to Return to Play, Meets All Above Stages   []  Not Ready for Return to Sports   Comments:            PLAN: See eval. PT 2x / week for 12 weeks. [] Continue per plan of care [] Alter current plan (see comments)  [x] Plan of care initiated [] Hold pending MD visit [] Discharge    Electronically signed by: Rupesh Dennison DPT, OMT-C, Bertie Hatchet, [address] [place] [address]  Therapist was present, directed the patient's care, made skilled judgement, and was responsible for assessment and treatment of the patient. Junior Nieto, SPT       Note: If patient does not return for scheduled/ recommended follow up visits, this note will serve as a discharge from care along with most recent update on progress.

## 2022-04-04 NOTE — FLOWSHEET NOTE
[facility]  Phone: [phone]   Fax: [phone]    Physical Therapy Treatment Note/ Progress Report:     Date:  2022    Patient Name:  Radha Rashid    :  1990  MRN: [id number]  Restrictions/Precautions:    Medical/Treatment Diagnosis Information:  Diagnosis: Z98.890, Z87.81 (ICD-10-CM) - S/P ORIF (open reduction internal fixation) [id number] (ICD-10-CM) - Closed trimalleolar fracture of left ankle with routine healing, subsequent encounter  Treatment Diagnosis: decreased ROM, strength, proprioception, balance impairments, reduced motor control, reduced participation in ADLs/IADLs, reduced tolerance for weight-bearing activities  Insurance/Certification information:  PT Insurance Information: [facility] [employer]--no auth required  Physician Information:  Referring Practitioner: Baron Chambers MD  Plan of care signed (Y/N): []  Yes [x]  No     Date of Patient follow up with Physician:      Progress Report: []  Yes  [x]  No     Date Range for reporting period:  Beginning: 3/28  Ending:     Progress report due (10 Rx/or 30 days whichever is less): visit #10 or  (date)     Recertification due (POC duration/ or 90 days whichever is less): visit #24 or  (date)     Visit # Insurance Allowable Auth required? Date Range   3 30 []  Yes  [x]  No        Units approved Units used Date Range          Latex Allergy:  [x]NO      []YES  Preferred Language for Healthcare:   [x]English       []other:    Functional Scale:           Date assessed:  TO physical FS primary measure score = 20; risk adjusted = 47  3/28    Pain level:  4/10      SUBJECTIVE: Pt reports her ankle is a little sore today and says HEP is going good. Pt reports she has not walked on foot yet and was waiting till today to do so.        OBJECTIVE:     RESTRICTIONS/PRECAUTIONS: L ankle ORIF due to trimalleolar fx on ; WBAT with boot starting 4/2/2022    Exercises/Interventions:     Therapeutic Exercise (19758)  Resistance / level Sets/sec Reps Notes / Cues   Seated Gastroc/Soleus Stretch  30\" 5    EOB PF/DF   1 20    EOB IV/EV  1 20    Towel Scrunches  Seated HR/TR  Hamstring stretch  30\" 3 4/4   Sidelying clamshells npv      Sidelying SLR ABD npv      LAQ npv      Heel slide supine  5' hold  4/4   Therapeutic Activities (89701)       Proper use of crutches, crutch placement, gait mechanics, and ambulation with WBAT precautions with boot  8' 4/4                        Neuromuscular Re-ed (72441)       Rocker Board PF/DF/IV/EV  -double leg   -L only               Manual Intervention (97718)       Knee mobs/PROM       Tib/Fem Mobs       Patella Mobs       Ankle mobs (distraction, PROM, calcaneal IV/EV) Grade 1-2 12'     Scar Massage  4' 4/4              Modalities: vaso x15' L ankle to decrease swelling/inflammation    Pt. Education:  -pt educated on diagnosis, prognosis and expectations for rehab  -all pt questions were answered    Home Exercise Program:    Access Code: [email or web address]  URL: [email or web address]  Date: 03/28/2022  Prepared by: Robert Wood    Exercises  Supine Single Leg Ankle Pumps - 3 x daily - 7 x weekly - 20 reps  Supine Ankle Inversion Eversion AROM - 3 x daily - 7 x weekly - 20 reps  Long Sitting Calf Stretch with Strap - 3 x daily - 7 x weekly - 4 reps - 15 hold  Seated Toe Towel Scrunches - 3 x daily - 7 x weekly - 20 reps      Therapeutic Exercise and NMR EXR  [x] (05027) Provided verbal/tactile cueing for activities related to strengthening, flexibility, endurance, ROM for improvements in LE, proximal hip, and core control with self care, mobility, lifting, ambulation.  [] (88456) Provided verbal/tactile cueing for activities related to improving balance, coordination, kinesthetic sense, posture, motor skill, proprioception  to assist with LE, proximal hip, and core control in self care, mobility, lifting, ambulation and eccentric single leg control.   [] (97674) Therapist is in constant attendance of 2 or more patients providing skilled therapy interventions, but not providing any significant amount of measurable one-on-one time to either patient, for improvements in LE, proximal hip, and core control in self care, mobility, lifting, ambulation and eccentric single leg control. NMR and Therapeutic Activities:    [x] (85736 or 29561) Provided verbal/tactile cueing for activities related to improving balance, coordination, kinesthetic sense, posture, motor skill, proprioception and motor activation to allow for proper function of core, proximal hip and LE with self care and ADLs  [] (44039) Gait Re-education- Provided training and instruction to the patient for proper LE, core and proximal hip recruitment and positioning and eccentric body weight control with ambulation re-education including up and down stairs     Home Exercise Program:    [] (76538) Reviewed/Progressed HEP activities related to strengthening, flexibility, endurance, ROM of core, proximal hip and LE for functional self-care, mobility, lifting and ambulation/stair navigation   [] (67929)Reviewed/Progressed HEP activities related to improving balance, coordination, kinesthetic sense, posture, motor skill, proprioception of core, proximal hip and LE for self care, mobility, lifting, and ambulation/stair navigation      Manual Treatments:  PROM / STM / Oscillations-Mobs:  G-I, II, III, IV (PA's, Inf., Post.)  [x] (19878) Provided manual therapy to mobilize LE, proximal hip and/or LS spine soft tissue/joints for the purpose of modulating pain, promoting relaxation,  increasing ROM, reducing/eliminating soft tissue swelling/inflammation/restriction, improving soft tissue extensibility and allowing for proper ROM for normal function with self care, mobility, lifting and ambulation.      Modalities:  [] (28395) Vasopneumatic compression: Utilized vasopneumatic compression to decrease edema / swelling for the purpose of improving mobility and quad tone / recruitment which will allow for increased overall function including but not limited to self-care, transfers, ambulation, and ascending / descending stairs. Charges:  Timed Code Treatment Minutes: 48   Total Treatment Minutes: 63     [] EVAL - LOW (64282)   [] EVAL - MOD (99918)  [] EVAL - HIGH (77235)  [] RE-EVAL (13076)  [x] VV(35671) x   1    [] Ionto  [] NMR (63552) x          [x] Vaso  [x] Manual (52121) x   1    [] Ultrasound  [x] TA x   1     [] Mech Traction (19746)  [] Aquatic Therapy x      [] ES (un) (15777):   [] Home Management Training x  [] ES(attended) (30284)   [] Dry Needling 1-2 muscles (22815):  [] Dry Needling 3+ muscles (895527  [] Group:      [] Other:     GOALS:  Patient stated goal: return to stair climbing, walking and playing with kids. [] Progressing: [] Met: [] Not Met: [] Adjusted    Therapist goals for Patient:   Short Term Goals: To be achieved in: 2 weeks  1. Independent in HEP and progression per patient tolerance, in order to prevent re-injury. [] Progressing: [] Met: [] Not Met: [] Adjusted  2. Patient will have a decrease in pain to facilitate improvement in movement, function, and ADLs as indicated by Functional Deficits. [] Progressing: [] Met: [] Not Met: [] Adjusted    Long Term Goals: To be achieved in: 12 weeks  1. FOTO score of at least 60 to assist with reaching prior level of function. [] Progressing: [] Met: [] Not Met: [] Adjusted  2. Patient will demonstrate increased L ankle dorsiflexion AROM to 0 degrees  to allow for proper joint functioning and ability negotiate stairs safely. [] Progressing: [] Met: [] Not Met: [] Adjusted  3. Patient will demonstrate an increase in Strength to at least 4+/5 as well as good proximal hip strength and control to allow for proper functional mobility and safe ascent/descent of stairs.    [] Progressing: [] Met: [] Not Met: [] Adjusted  4. Patient will return to functional activities including playing with her children without increased symptoms or restriction. [] Progressing: [] Met: [] Not Met: [] Adjusted  5. Pt will be able to maintain appropriate balance in L LE SLS for 15 seconds with no use of UE support to demonstrate safety in navigating stairs and other functional activities. [] Progressing: [] Met: [] Not Met: [] Adjusted         Overall Progression Towards Functional goals/ Treatment Progress Update:  [] Patient is progressing as expected towards functional goals listed. [] Progression is slowed due to complexities/Impairments listed. [] Progression has been slowed due to co-morbidities. [x] Plan just implemented, too soon to assess goals progression <30days   [] Goals require adjustment due to lack of progress  [] Patient is not progressing as expected and requires additional follow up with physician  [] Other    Persisting Functional Limitations/Impairments:  []Sitting [x]Standing   [x]Walking [x]Stairs   [x]Transfers [x]ADLs   [x]Squatting/bending []Kneeling  [x]Housework []Job related tasks  [x]Driving [x]Sports/Recreation   [x]Sleeping []Other:    ASSESSMENT: Pt completed this therapy session with continued lack of ROM and strength of the ankle and tightness in the calf muscle. Pt had tightness with AP forefoot mobilizations and has noticeable swelling throughout ankle. Lymph and scar massage initiated this session to move fluid out of foot and ankle joint and to loosen up scar for ROM. Pt introduced and given compression stocking this session to reduce swelling and was instructed to wear during the day and take off in the evening before bed. Knee and hip strengthening will be initiated next session for ambulation and gait mechanics due to patient being able to WBAT while in the boot.  Pt instructed how to ambulate with WBAT precautions and crutches and pt responded well and was able to tolerate weight on boot and was able to reciprocate correct gait pattern. Use of vaso at end of session this date to decrease swelling and inflammation throughout ankle joint. Pt will continue to benefit from skilled therapy to in order to maximize strength and ROM of the ankle, reduce swelling, and reduce pain to assist with ambulation, gait mechanics, and functional activity. Treatment/Activity Tolerance:  [x] Pt able to complete treatment [] Patient limited by fatique  [] Patient limited by pain  [] Patient limited by other medical complications  [] Other:     Prognosis:  [x] Good [] Fair  [] Poor    Patient Requires Follow-up: [x] Yes  [] No    Return to Play:    [x]  N/A   []  Stage 1: Intro to Strength   []  Stage 2: Return to Run and Strength   []  Stage 3: Return to Jump and Strength   []  Stage 4: Dynamic Strength and Agility   []  Stage 5: Sport Specific Training     []  Ready to Return to Play, Meets All Above Stages   []  Not Ready for Return to Sports   Comments:            PLAN: See eval. PT 2x / week for 12 weeks. [] Continue per plan of care [] Alter current plan (see comments)  [x] Plan of care initiated [] Hold pending MD visit [] Discharge    Electronically signed by: Dirk Perez DPT, GHADA, YOLI [place]ers  Session observed and directed by Naa Guajardo PTA [id number]        Note: If patient does not return for scheduled/ recommended follow up visits, this note will serve as a discharge from care along with most recent update on progress.

## 2022-04-06 ENCOUNTER — HOSPITAL ENCOUNTER (OUTPATIENT)
Dept: PHYSICAL THERAPY | Age: 32
Setting detail: THERAPIES SERIES
Discharge: HOME | End: 2022-04-06
Payer: COMMERCIAL

## 2022-04-06 NOTE — FLOWSHEET NOTE
[address] [facility]    Physical Therapy  Cancellation/No-show Note  Patient Name:  Fe See  :  1990   Date:  2022    Cancelled visits to date: 1  No-shows to date: 0    For today's appointment patient:  [x]  Cancelled   []  Rescheduled appointment  []  No-show     Reason given by patient:  []  Patient ill  []  Conflicting appointment  [x]  No transportation - Car won't start     []  Conflict with work  []  No reason given  []  Other:     Comments:      Phone call information:   []  Phone call made today to patient at _ time at number provided:      []  Patient answered, conversation as follows:    []  Patient did not answer, message left as follows:  []  Phone call not made today  [x]  Phone call not needed - pt contacted us to cancel and provided reason for cancellation.      Electronically signed by:  Kirk Avalos PT

## 2022-04-11 ENCOUNTER — HOSPITAL ENCOUNTER (OUTPATIENT)
Dept: PHYSICAL THERAPY | Age: 32
Setting detail: THERAPIES SERIES
Discharge: HOME | End: 2022-04-11
Payer: COMMERCIAL

## 2022-04-11 PROCEDURE — 97140 MANUAL THERAPY 1/> REGIONS: CPT

## 2022-04-11 PROCEDURE — 97530 THERAPEUTIC ACTIVITIES: CPT

## 2022-04-11 PROCEDURE — 97110 THERAPEUTIC EXERCISES: CPT

## 2022-04-11 NOTE — FLOWSHEET NOTE
[facility]  Phone: [phone]   Fax: [phone]    Physical Therapy Treatment Note/ Progress Report:     Date:  2022    Patient Name:  Everardo Bay    :  1990  MRN: [id number]  Restrictions/Precautions:    Medical/Treatment Diagnosis Information:  Diagnosis: Z98.890, Z87.81 (ICD-10-CM) - S/P ORIF (open reduction internal fixation) [id number] (ICD-10-CM) - Closed trimalleolar fracture of left ankle with routine healing, subsequent encounter  Treatment Diagnosis: decreased ROM, strength, proprioception, balance impairments, reduced motor control, reduced participation in ADLs/IADLs, reduced tolerance for weight-bearing activities  Insurance/Certification information:  PT Insurance Information: [facility] [employer]--no auth required  Physician Information:  Referring Practitioner: Juan Luis Pickard MD  Plan of care signed (Y/N): []  Yes [x]  No     Date of Patient follow up with Physician:      Progress Report: []  Yes  [x]  No     Date Range for reporting period:  Beginning: 3/28  Ending:     Progress report due (10 Rx/or 30 days whichever is less): visit #10 or  (date)     Recertification due (POC duration/ or 90 days whichever is less): visit #24 or  (date)     Visit # Insurance Allowable Auth required? Date Range    []  Yes  [x]  No        Units approved Units used Date Range          Latex Allergy:  [x]NO      []YES  Preferred Language for Healthcare:   [x]English       []other:    Functional Scale:           Date assessed:  TO physical FS primary measure score = 20; risk adjusted = 47  3/28    Pain level:  2/10      SUBJECTIVE: Pt reports ankle is feeling a little stiff and states she did a lot of activity outside of the house this weekend. Reports HEP is going good and has no questions.        OBJECTIVE:     RESTRICTIONS/PRECAUTIONS: L ankle ORIF due to trimalleolar fx on ; WBAT with boot starting 4/2/2022    Exercises/Interventions:     Therapeutic Exercise (52279)  Resistance / level Sets/sec Reps Notes / Cues   Seated Gastroc/Soleus Stretch  30\" 5    EOB PF/DF  peach 1 20    EOB IV/EV peach 1 20    Towel Scrunches  Seated HR/TR  Hamstring stretch  30\" 3 4/4   Sidelying clamshells npv      Sidelying SLR ABD npv      LAQ npv      Heel slide supine  Therapeutic Activities (97761)       Gait training with increased weight bearing and crutches  5'4/11Biodex Balance Weight shift training 3'  4/11- setting 12                 Neuromuscular Re-ed (91466)       Rocker Board PF/DF/IV/EV  -double leg   -L only Square 2 20 each 3/30                 Manual Intervention (10513)       Knee mobs/PROM       Tib/Fem Mobs       Patella Mobs       Ankle mobs (distraction, PROM, calcaneal IV/EV) Grade 1-2 13'     Scar Massage  4' 4/4              Modalities: vaso x15' L ankle to decrease swelling/inflammation    Pt. Education:  -pt educated on diagnosis, prognosis and expectations for rehab  -all pt questions were answered    Home Exercise Program:    Access Code: [email or web address]  URL: [email or web address]  Date: 03/28/2022  Prepared by: Leslie Kelley    Exercises  Supine Single Leg Ankle Pumps - 3 x daily - 7 x weekly - 20 reps  Supine Ankle Inversion Eversion AROM - 3 x daily - 7 x weekly - 20 reps  Long Sitting Calf Stretch with Strap - 3 x daily - 7 x weekly - 4 reps - 15 hold  Seated Toe Towel Scrunches - 3 x daily - 7 x weekly - 20 reps      Therapeutic Exercise and NMR EXR  [x] (26497) Provided verbal/tactile cueing for activities related to strengthening, flexibility, endurance, ROM for improvements in LE, proximal hip, and core control with self care, mobility, lifting, ambulation.  [] (49987) Provided verbal/tactile cueing for activities related to improving balance, coordination, kinesthetic sense, posture, motor skill, proprioception  to assist with LE, proximal hip, and core control in self care, mobility, lifting, ambulation and eccentric single leg control.   [] (20291) Therapist is in constant attendance of 2 or more patients providing skilled therapy interventions, but not providing any significant amount of measurable one-on-one time to either patient, for improvements in LE, proximal hip, and core control in self care, mobility, lifting, ambulation and eccentric single leg control. NMR and Therapeutic Activities:    [x] (84105 or 21018) Provided verbal/tactile cueing for activities related to improving balance, coordination, kinesthetic sense, posture, motor skill, proprioception and motor activation to allow for proper function of core, proximal hip and LE with self care and ADLs  [] (95745) Gait Re-education- Provided training and instruction to the patient for proper LE, core and proximal hip recruitment and positioning and eccentric body weight control with ambulation re-education including up and down stairs     Home Exercise Program:    [] (44393) Reviewed/Progressed HEP activities related to strengthening, flexibility, endurance, ROM of core, proximal hip and LE for functional self-care, mobility, lifting and ambulation/stair navigation   [] (80123)Reviewed/Progressed HEP activities related to improving balance, coordination, kinesthetic sense, posture, motor skill, proprioception of core, proximal hip and LE for self care, mobility, lifting, and ambulation/stair navigation      Manual Treatments:  PROM / STM / Oscillations-Mobs:  G-I, II, III, IV (PA's, Inf., Post.)  [x] (29335) Provided manual therapy to mobilize LE, proximal hip and/or LS spine soft tissue/joints for the purpose of modulating pain, promoting relaxation,  increasing ROM, reducing/eliminating soft tissue swelling/inflammation/restriction, improving soft tissue extensibility and allowing for proper ROM for normal function with self care, mobility, lifting and ambulation.      Modalities:  [] (83714) Vasopneumatic compression: Utilized vasopneumatic compression to decrease edema / swelling for the purpose of improving mobility and quad tone / recruitment which will allow for increased overall function including but not limited to self-care, transfers, ambulation, and ascending / descending stairs. Charges:  Timed Code Treatment Minutes: 42   Total Treatment Minutes: 42     [] EVAL - LOW (38398)   [] EVAL - MOD (51090)  [] EVAL - HIGH (66575)  [] RE-EVAL (81513)  [x] CD(57048) x   1    [] Ionto  [] NMR (24794) x          [] Vaso  [x] Manual (48085) x   1    [] Ultrasound  [x] TA x   1     [] Mech Traction (34784)  [] Aquatic Therapy x      [] ES (un) (84791):   [] Home Management Training x  [] ES(attended) (23588)   [] Dry Needling 1-2 muscles (20848):  [] Dry Needling 3+ muscles (648113  [] Group:      [] Other:     GOALS:  Patient stated goal: return to stair climbing, walking and playing with kids. [] Progressing: [] Met: [] Not Met: [] Adjusted    Therapist goals for Patient:   Short Term Goals: To be achieved in: 2 weeks  1. Independent in HEP and progression per patient tolerance, in order to prevent re-injury. [] Progressing: [] Met: [] Not Met: [] Adjusted  2. Patient will have a decrease in pain to facilitate improvement in movement, function, and ADLs as indicated by Functional Deficits. [] Progressing: [] Met: [] Not Met: [] Adjusted    Long Term Goals: To be achieved in: 12 weeks  1. FOTO score of at least 60 to assist with reaching prior level of function. [] Progressing: [] Met: [] Not Met: [] Adjusted  2. Patient will demonstrate increased L ankle dorsiflexion AROM to 0 degrees  to allow for proper joint functioning and ability negotiate stairs safely. [] Progressing: [] Met: [] Not Met: [] Adjusted  3. Patient will demonstrate an increase in Strength to at least 4+/5 as well as good proximal hip strength and control to allow for proper functional mobility and safe ascent/descent of stairs.    [] Progressing: [] Met: [] Not Met: [] Adjusted  4. Patient will return to functional activities including playing with her children without increased symptoms or restriction. [] Progressing: [] Met: [] Not Met: [] Adjusted  5. Pt will be able to maintain appropriate balance in L LE SLS for 15 seconds with no use of UE support to demonstrate safety in navigating stairs and other functional activities. [] Progressing: [] Met: [] Not Met: [] Adjusted         Overall Progression Towards Functional goals/ Treatment Progress Update:  [] Patient is progressing as expected towards functional goals listed. [] Progression is slowed due to complexities/Impairments listed. [] Progression has been slowed due to co-morbidities. [x] Plan just implemented, too soon to assess goals progression <30days   [] Goals require adjustment due to lack of progress  [] Patient is not progressing as expected and requires additional follow up with physician  [] Other    Persisting Functional Limitations/Impairments:  []Sitting [x]Standing   [x]Walking [x]Stairs   [x]Transfers [x]ADLs   [x]Squatting/bending []Kneeling  [x]Housework []Job related tasks  [x]Driving [x]Sports/Recreation   [x]Sleeping []Other:    ASSESSMENT: Pt completed this session with continued lack of ROM and strength in the R ankle. Pt complains of calf tightness so stretches were continued to relieve tension for ambulation. Pt reported that she did not feel like she was putting much weight through R LE when ambulating with boot on so biodex weight shift training was initiated to demonstrate proper WB status and to improve balance and postural control. Pt ambulated with crutches and boot on following biodex and pt reported that she felt like she was putting more weight through the R LE and that she had achilles tightness. Pt tolerated upgraded resistance with 4 way ankle exercises and stated she did not have pain or any new symptoms.  Pt would continue to benefit from skilled therapy to in order to maximize strength and ROM of the ankle, reduce swelling, and reduce pain to assist with ambulation, gait mechanics, and functional activity. Treatment/Activity Tolerance:  [x] Pt able to complete treatment [] Patient limited by fatique  [] Patient limited by pain  [] Patient limited by other medical complications  [] Other:     Prognosis:  [x] Good [] Fair  [] Poor    Patient Requires Follow-up: [x] Yes  [] No    Return to Play:    [x]  N/A   []  Stage 1: Intro to Strength   []  Stage 2: Return to Run and Strength   []  Stage 3: Return to Jump and Strength   []  Stage 4: Dynamic Strength and Agility   []  Stage 5: Sport Specific Training     []  Ready to Return to Play, Meets All Above Stages   []  Not Ready for Return to Sports   Comments:            PLAN: See eval. PT 2x / week for 12 weeks. [] Continue per plan of care [] Alter current plan (see comments)  [x] Plan of care initiated [] Hold pending MD visit [] Discharge    Electronically signed by: Loi Trujillo, PTA [id number], YOLI [place]  Session observed and directed by Zuhair Carrillo PTA [id number]        Note: If patient does not return for scheduled/ recommended follow up visits, this note will serve as a discharge from care along with most recent update on progress.

## 2022-04-13 ENCOUNTER — TELEPHONE (OUTPATIENT)
Dept: ORTHOPEDIC SURGERY | Age: 32
End: 2022-04-13

## 2022-04-13 ENCOUNTER — HOSPITAL ENCOUNTER (OUTPATIENT)
Dept: PHYSICAL THERAPY | Age: 32
Setting detail: THERAPIES SERIES
Discharge: HOME | End: 2022-04-13
Payer: COMMERCIAL

## 2022-04-13 PROCEDURE — 97140 MANUAL THERAPY 1/> REGIONS: CPT

## 2022-04-13 PROCEDURE — 97530 THERAPEUTIC ACTIVITIES: CPT

## 2022-04-13 NOTE — TELEPHONE ENCOUNTER
I called the patient and moved her appointment to Friday to discuss possible surgery. She is aware if she needs surgery that I will call her next week to discuss details.

## 2022-04-13 NOTE — TELEPHONE ENCOUNTER
General Question     Subject: PT IS LOSING HER HEALTH INSURANCE AT THE END OF THE MONTH. SHE WANTS TO KNOW IF SHE CAN GET HER NEXT APPT MOVED UP AND THE SX DATE MOVED UP BEFORE MAY 1ST.   Patient and /or Facility Request: Ly Rosales  Contact Number: [phone]

## 2022-04-13 NOTE — FLOWSHEET NOTE
[facility]  Phone: [phone]   Fax: [phone]    Physical Therapy Treatment Note/ Progress Report:     Date:  2022    Patient Name:  Silvio Ram    :  1990  MRN: [id number]  Restrictions/Precautions:    Medical/Treatment Diagnosis Information:  Diagnosis: Z98.890, Z87.81 (ICD-10-CM) - S/P ORIF (open reduction internal fixation) [id number] (ICD-10-CM) - Closed trimalleolar fracture of left ankle with routine healing, subsequent encounter  Treatment Diagnosis: decreased ROM, strength, proprioception, balance impairments, reduced motor control, reduced participation in ADLs/IADLs, reduced tolerance for weight-bearing activities  Insurance/Certification information:  PT Insurance Information: [facility] [employer]--no auth required  Physician Information:  Referring Practitioner: Carlton Barber MD  Plan of care signed (Y/N): [x]  Yes []  No     Date of Patient follow up with Physician:      Progress Report: []  Yes  [x]  No     Date Range for reporting period:  Beginning: 3/28  Ending:     Progress report due (10 Rx/or 30 days whichever is less): visit #10 or  (date)     Recertification due (POC duration/ or 90 days whichever is less): visit #24 or  (date)     Visit # Insurance Allowable Auth required? Date Range    30 []  Yes  [x]  No        Units approved Units used Date Range          Latex Allergy:  [x]NO      []YES  Preferred Language for Healthcare:   [x]English       []other:    Functional Scale:           Date assessed:  TO physical FS primary measure score = 20; risk adjusted = 47  3/28    Pain level:  2-3/10      SUBJECTIVE: Pt reports that the ankle feels tight and she feels this will be the complaint for a while.  She reports that her  lost his job this morning and their benefits will  at the end of the month so she has only a few visits left before she will not be able to come in for 90 days with the new benefits. She reports compliance with her HEP and is walking more. OBJECTIVE:     RESTRICTIONS/PRECAUTIONS: L ankle ORIF due to trimalleolar fx on 2/14; WBAT with boot starting 4/2/2022    Exercises/Interventions:     Therapeutic Exercise (43922)  Resistance / level Sets/sec Reps Notes / Cues   Seated Gastroc/Soleus Stretch  30\" 5    EOB PF/DF  peach 1 20    EOB IV/EV peach 1 20    Towel Scrunches  Seated HR/TR  Hamstring stretch  30\" 3 4/4   Sidelying clamshells npv      Sidelying SLR ABD npv      LAQ npv      Heel slide supine  Therapeutic Activities (61461)       Gait training with increased weight bearing and crutches  5'4/11Biodex Balance Weight shift training 6' 4/11- setting 12                 Neuromuscular Re-ed (67662)       Rocker Board PF/DF/IV/EV  -double leg   -L only Square 2 20 each 3/30                 Manual Intervention (39215)       Knee mobs/PROM       Tib/Fem Mobs       Patella Mobs       Ankle mobs (distraction, PROM, calcaneal IV/EV)    Scar Massage 4/4   IASTM to calf and achilles   10'  4/13                                          Modalities:     Pt. Education:  -pt educated on diagnosis, prognosis and expectations for rehab  -all pt questions were answered    Home Exercise Program:     Access Code: [email or web address]  URL: [email or web address]  Date: 03/28/2022  Prepared by: Hany Wade    Exercises  Supine Single Leg Ankle Pumps - 3 x daily - 7 x weekly - 20 reps  Supine Ankle Inversion Eversion AROM - 3 x daily - 7 x weekly - 20 reps  Long Sitting Calf Stretch with Strap - 3 x daily - 7 x weekly - 4 reps - 15 hold  Seated Toe Towel Scrunches - 3 x daily - 7 x weekly - 20 reps      Therapeutic Exercise and NMR EXR  [x] (16095) Provided verbal/tactile cueing for activities related to strengthening, flexibility, endurance, ROM for improvements in LE, proximal hip, and core control with self care, mobility, lifting, ambulation.  [] (59962) Provided verbal/tactile cueing for activities related to improving balance, coordination, kinesthetic sense, posture, motor skill, proprioception  to assist with LE, proximal hip, and core control in self care, mobility, lifting, ambulation and eccentric single leg control.   [] (98675) Therapist is in constant attendance of 2 or more patients providing skilled therapy interventions, but not providing any significant amount of measurable one-on-one time to either patient, for improvements in LE, proximal hip, and core control in self care, mobility, lifting, ambulation and eccentric single leg control.      NMR and Therapeutic Activities:    [x] (64264 or 86826) Provided verbal/tactile cueing for activities related to improving balance, coordination, kinesthetic sense, posture, motor skill, proprioception and motor activation to allow for proper function of core, proximal hip and LE with self care and ADLs  [] (56913) Gait Re-education- Provided training and instruction to the patient for proper LE, core and proximal hip recruitment and positioning and eccentric body weight control with ambulation re-education including up and down stairs     Home Exercise Program:    [] (89930) Reviewed/Progressed HEP activities related to strengthening, flexibility, endurance, ROM of core, proximal hip and LE for functional self-care, mobility, lifting and ambulation/stair navigation   [] (79577)Reviewed/Progressed HEP activities related to improving balance, coordination, kinesthetic sense, posture, motor skill, proprioception of core, proximal hip and LE for self care, mobility, lifting, and ambulation/stair navigation      Manual Treatments:  PROM / STM / Oscillations-Mobs:  G-I, II, III, IV (PA's, Inf., Post.)  [x] (86155) Provided manual therapy to mobilize LE, proximal hip and/or LS spine soft tissue/joints for the purpose of modulating pain, promoting relaxation,  increasing ROM, reducing/eliminating soft tissue swelling/inflammation/restriction, improving soft tissue extensibility and allowing for proper ROM for normal function with self care, mobility, lifting and ambulation. Modalities:  [] (58464) Vasopneumatic compression: Utilized vasopneumatic compression to decrease edema / swelling for the purpose of improving mobility and quad tone / recruitment which will allow for increased overall function including but not limited to self-care, transfers, ambulation, and ascending / descending stairs. Charges:  Timed Code Treatment Minutes: 45   Total Treatment Minutes: 45     [] EVAL - LOW (21004)   [] EVAL - MOD (97931)  [] EVAL - HIGH (76014)  [] RE-EVAL (36132)  [] SB(96125) x   1    [] Ionto  [] NMR (32438) x          [] Vaso  [x] Manual (64004) x  1    [] Ultrasound  [x] TA x   2     [] Mech Traction (54351)  [] Aquatic Therapy x      [] ES (un) (52515):   [] Home Management Training x  [] ES(attended) (51810)   [] Dry Needling 1-2 muscles (30942):  [] Dry Needling 3+ muscles (390414  [] Group:      [] Other:     GOALS:  Patient stated goal: return to stair climbing, walking and playing with kids. [] Progressing: [] Met: [] Not Met: [] Adjusted    Therapist goals for Patient:   Short Term Goals: To be achieved in: 2 weeks  1. Independent in HEP and progression per patient tolerance, in order to prevent re-injury. [] Progressing: [] Met: [] Not Met: [] Adjusted  2. Patient will have a decrease in pain to facilitate improvement in movement, function, and ADLs as indicated by Functional Deficits. [] Progressing: [] Met: [] Not Met: [] Adjusted    Long Term Goals: To be achieved in: 12 weeks  1. FOTO score of at least 60 to assist with reaching prior level of function. [] Progressing: [] Met: [] Not Met: [] Adjusted  2. Patient will demonstrate increased L ankle dorsiflexion AROM to 0 degrees  to allow for proper joint functioning and ability negotiate stairs safely. [] Progressing: [] Met: [] Not Met: [] Adjusted  3.  Patient will demonstrate an increase in Strength to at least 4+/5 as well as good proximal hip strength and control to allow for proper functional mobility and safe ascent/descent of stairs. [] Progressing: [] Met: [] Not Met: [] Adjusted  4. Patient will return to functional activities including playing with her children without increased symptoms or restriction. [] Progressing: [] Met: [] Not Met: [] Adjusted  5. Pt will be able to maintain appropriate balance in L LE SLS for 15 seconds with no use of UE support to demonstrate safety in navigating stairs and other functional activities. [] Progressing: [] Met: [] Not Met: [] Adjusted         Overall Progression Towards Functional goals/ Treatment Progress Update:  [] Patient is progressing as expected towards functional goals listed. [] Progression is slowed due to complexities/Impairments listed. [] Progression has been slowed due to co-morbidities. [x] Plan just implemented, too soon to assess goals progression <30days   [] Goals require adjustment due to lack of progress  [] Patient is not progressing as expected and requires additional follow up with physician  [] Other    Persisting Functional Limitations/Impairments:  []Sitting [x]Standing   [x]Walking [x]Stairs   [x]Transfers [x]ADLs   [x]Squatting/bending []Kneeling  [x]Housework []Job related tasks  [x]Driving [x]Sports/Recreation   [x]Sleeping []Other:    ASSESSMENT: Pt tolerated interventions well today and was able to progress her ambulation with B axillary crutches in the boot and verbal cues to heel strike and follow through to push off the toes. Pt continues to express that her achilles is tight and that is the most discomfort for her. IASTM was done to calf and achilles with increased redness produced. Pt was educated that redness may appear and she was understanding, but she reports following this intervention that she felt much more loose and the tightness/discomfort had decreased.  Pt is having financial complications that will be limiting pawel PT visits and therefore she only has 4 visit remaining at this time. Focus of these sessions will be to maximize functional activities that are not easily reproduced at home to allow for better participation in ADLs and mobility of her L ankle. Treatment/Activity Tolerance:  [x] Pt able to complete treatment [] Patient limited by fatique  [] Patient limited by pain  [] Patient limited by other medical complications  [] Other:     Prognosis:  [x] Good [] Fair  [] Poor    Patient Requires Follow-up: [x] Yes  [] No    Return to Play:    [x]  N/A   []  Stage 1: Intro to Strength   []  Stage 2: Return to Run and Strength   []  Stage 3: Return to Jump and Strength   []  Stage 4: Dynamic Strength and Agility   []  Stage 5: Sport Specific Training     []  Ready to Return to Play, Meets All Above Stages   []  Not Ready for Return to Sports   Comments:            PLAN: See eval. PT 2x / week for 12 weeks. [x] Continue per plan of care [] Alter current plan (see comments)  [] Plan of care initiated [] Hold pending MD visit [] Discharge    Electronically signed by: Ismael Car, PT, DPT, OMT-C  Therapist was present, directed the patient's care, made skilled judgement, and was responsible for assessment and treatment of the patient. Anita Lizama, SPT       Note: If patient does not return for scheduled/ recommended follow up visits, this note will serve as a discharge from care along with most recent update on progress.

## 2022-04-15 ENCOUNTER — TELEPHONE (OUTPATIENT)
Dept: ORTHOPEDIC SURGERY | Age: 32
End: 2022-04-15

## 2022-04-15 ENCOUNTER — OFFICE VISIT (OUTPATIENT)
Dept: ORTHOPEDIC SURGERY | Age: 32
End: 2022-04-15

## 2022-04-15 VITALS — RESPIRATION RATE: 15 BRPM | HEIGHT: 68 IN | WEIGHT: 192 LBS | BODY MASS INDEX: 29.1 KG/M2

## 2022-04-15 DIAGNOSIS — Z98.890 S/P ORIF (OPEN REDUCTION INTERNAL FIXATION) FRACTURE: Primary | ICD-10-CM

## 2022-04-15 DIAGNOSIS — T84.84XA PAINFUL ORTHOPAEDIC HARDWARE (HCC): ICD-10-CM

## 2022-04-15 DIAGNOSIS — Z87.81 S/P ORIF (OPEN REDUCTION INTERNAL FIXATION) FRACTURE: Primary | ICD-10-CM

## 2022-04-15 PROCEDURE — 99024 POSTOP FOLLOW-UP VISIT: CPT | Performed by: ORTHOPAEDIC SURGERY

## 2022-04-15 NOTE — PROGRESS NOTES
Morenita Childress  [id number]  April 15, 2022    Chief Complaint   Patient presents with    Post-Op Check     left ankle ORIF 2/14/22       History: The patient is a 35-year-old female who is here for evaluation of her left ankle. She is now approximately 2 months status post open reduction and internal fixation of a severely comminuted left ankle fracture dislocation. She reports mild pain. She has been wearing the boot. She denies any numbness or tingling. The patient does report that her  lost his job and they will be out of insurance for the next 90 days starting in May. The patient's  past medical history, medications, allergies,  family history, social history, and have been reviewed, and dated and are recorded in the chart. Pertinent items are noted in HPI. Review of systems reviewed from Pertinent History Form dated on 3/23/22 and available in the patient's chart under the Media tab. Vitals:  Resp 15   Ht 5' 8\" (1.727 m)   Wt 192 lb (87.1 kg)   BMI 29.19 kg/m²     Physical: On examination today, the patient is in no apparent distress. She is alert and oriented. She has mild swelling of the left ankle. There is no evidence of DVT. She is neurovascularly intact distally. Her incisions are well-healed. There is no evidence of drainage. We dorsiflex the left ankle to 15 degrees. She plantar flexes down to 20 degrees. X-rays: 3 views of the left ankle obtained in the office today were extensively reviewed. The ankle mortise is well reduced. The fibula fracture is well aligned and healing well. The medial malleolus fracture is healing well. The medial malleolus fragment was very small. The medial screw has migrated anteriorly. Impression: Status post open reduction and internal fixation of left ankle    Plan: At this time, we will go ahead and schedule the patient for syndesmotic screw removal of the left ankle. We will also plan on removing the medial screw.   We will plan on performing the procedure with a  LMA. The patient understands the risks of persistent stiffness, persistent pain, infection and the risks of anesthesia. The patient understands all risks and benefits and agrees to proceed.

## 2022-04-15 NOTE — TELEPHONE ENCOUNTER
CPT: 63273  BODY PART: left ankle  STATUS: outpatient  AUTHORIZATION: approved    Submitted online with [facility] 4/15/22 [id number]    Approved # [id number]  4/25/22-7/24/22

## 2022-04-15 NOTE — LETTER
[facility] 30: [phone] F: [phone]  Surgery Scheduling Form:  DEMOGRAPHICS:                                                                                                              .    Patient Name:  Jesus Villafuerte    Patient :  1990   Patient SS#:  [id number]      Patient Phone:  [phone] (home)      Patient Address:  [address]  [place] [address]    Comment: Dr. Selena Doan will do H&P    Insurance:    Payor/Plan Subscr  Sex Relation Sub. Ins. ID Effective Group Num   1. Slipager 41* 1993 Male Spouse [id number] 1/1/15 [id number]                                   [address] [address]     DIAGNOSIS & PROCEDURE:                                                                                            .    Diagnosis:   Painful hardware- left ankle T84.84XA    Operation:  Removal of syndesmotic screw, removal of medial screw- left ankle [id number]    Location:  [facility]    Surgeon:  Jenelle Payne    SCHEDULING INFORMATION:                                                                                         .    Requested Date:  22 OR Time:  2:05 pm Patient Arrival Time:  12:05 pm    OR Time Required:  30  Minutes    Anesthesia:  MAC/TIVA    SA Required:  Yes    Equipment:  none    Mini C-Arm:  No   Standard C-Arm:  No    Status:  Outpatient PAT Required:  Yes COVID19 test:  N/A    Latex Allergy:  no Defibrilator or Pacemaker:  no    Isolation Precautions:  no                    Vishal Doan MD     22   BILLING INFORMATION:                                                                                                    .                               CPT Code Modifier    Prior auth:apprived                                                Pre-Admission Testing Order Set   In accordance with our formulary system, a generic equivalent drug may be dispensed and administered unless a D.A. W. is written with the medication order  All orders without checkboxes will processed automatically unless crossed out. Orders with checkboxes MUST be checked in order to be carried out. Ubaldo Wilks Christo                                                        1990  Date of Procedure/Surgery: 4/25/22  1. H & P for ALL procedure/surgery completed within 30 days, to be updated day of procedure/surgery  2. [ ]Consults: PT/OT evaluation  3. [X ]Defer to Anesthesia for Pre-Admission testing orders  4. Diagnostic Tests:  [ ]EKG (if not completed in last 3 months) IF: (check all that apply)   Other:  [ Cindy Matty (if not completed in last 6 months) IF: (check all that apply)   Hx Malignancy   Hx CVS/Thoracic Surg   CVS Disease   Hx Pneumonia last 3 months   Chronic Pulm Disease  Other:  [ ]Radiology   Knee Right Left Standing AP knee, hip to ankle on scoliosis film   Other:  5. Labs  [ ]Basic Metabolic Profile  IF: (check all that apply)  [ ]Albumin    Other:     __________________________________________________________________  [ ]CBC without diff   Pre op exam      __________________________________________________________________  [ ]PT/INR  PTT   Pre op exam         __________________________________________________________________  [ ]Type & Screen   Surg with potiential for signif. blood loss  [ ]Type & cross match 2 units         __________________________________________________________________  [ ]Urine routine reflex to culture (UAR) If cultures positive, repeat on                  admission [ Sol Dearth catch urine  [ ]Nasal culture for MRSA   [ ]Nasal MRSA culture  __________________________________________________________________  6. [ ]Other:      TO: ___________________________________________ Date: ____________ Time: _________    Physician Signature:          4/20/2022 9:29 AM                   Pre-operative Physician Prophylaxis Orders      Ubaldo Villafuerte  1990    All orders without checkboxes will be processed automatically unless crossed out.  Orders with checkboxes MUST be checked in order to be carried out. 1. Allergies: Hydrocodone and Oxycodone    2. Procedure:             Ht Readings from Last 1 Encounters:   04/15/22 5' 8\" (1.727 m)               Wt Readings from Last 1 Encounters:   04/15/22 192 lb (87.1 kg)     4. [ ] DVT Prophylaxis-Contraindication (Do not give)  Allergy to heparin Currently on anticoagulation  Not indicated, low risk patient  Thrombocytopenia Admitted for bleeding diagnosis Surgery or invasive procedure  [ ] Sequential Compression Boots to unaffected or bilateral extremities  [ ] Venous Compression Hose (WISAM hose) to unaffected or bilateral extremities        Knee high  [ ] Heparin 5,000 units subcutaneously 1-2 hours pre op into the thigh opposite of operative site    5.   [ ] Beta Blocker  Patient to take beta blocker the morning of surgery with a sip of water as prescribed at home  Other:    6. Jean-Pierre.Furnace ] Antimicrobial Prophylaxis (Consensus Guideline Recommendations) for       S.C.I. P. procedures  All antibiotics to be initiated 30 minutes pre-op (prior to leaving pre-op hold area/ACU) EXCEPT: Vancomycin and Ciprofloxacin. Initiate Vancomycin and Ciprofloxacin 2 hours pre-op. For combination antibiotic orders, start Ciprofloxacin first, then start second antibiotic ordered. In house patients, send pre-op antibiotics with patient to pre-op hold, do not initiate.     Surgical Procedure Routine pre-op Antibiotic  Penicillin or Cephalosporin Allergy  Orthopaedic  X Cefazolin (Ancef) 2 gm IVPB x1 X Clindamycin 900 mg IVPB x1    or     If > 80 kg Cefazolin 2 gm IVPB x1 Vancomycin 1 gm IVPB x1  Approved indications for Vancomycin:  MRSA related chronic wound dialysis  Other antibiotic to be given:    TO: _______________________________________________________ Date: ____________ Time: _______    Physician Signature:           Date: 4/20/2022  Time: 9:29 AM    Faxed to Pharmacy RN Signature: _________________________________ Date: _________ Time: _______

## 2022-04-18 ENCOUNTER — HOSPITAL ENCOUNTER (OUTPATIENT)
Dept: PHYSICAL THERAPY | Age: 32
Setting detail: THERAPIES SERIES
Discharge: HOME | End: 2022-04-18
Payer: COMMERCIAL

## 2022-04-18 PROCEDURE — 97110 THERAPEUTIC EXERCISES: CPT

## 2022-04-18 PROCEDURE — 97140 MANUAL THERAPY 1/> REGIONS: CPT

## 2022-04-18 PROCEDURE — 97530 THERAPEUTIC ACTIVITIES: CPT

## 2022-04-18 NOTE — FLOWSHEET NOTE
Arjun Mendoza  Phone: [phone]   Fax: [phone]    Physical Therapy Treatment Note/ Progress Report:     Date:  2022    Patient Name:  Samanta Najera    :  1990  MRN: [id number]  Restrictions/Precautions:    Medical/Treatment Diagnosis Information:  Diagnosis: Z98.890, Z87.81 (ICD-10-CM) - S/P ORIF (open reduction internal fixation) [id number] (ICD-10-CM) - Closed trimalleolar fracture of left ankle with routine healing, subsequent encounter  Treatment Diagnosis: decreased ROM, strength, proprioception, balance impairments, reduced motor control, reduced participation in ADLs/IADLs, reduced tolerance for weight-bearing activities  Insurance/Certification information:  PT Insurance Information: [facility] [employer]--no auth required  Physician Information:  Referring Practitioner: Froylan Latham MD  Plan of care signed (Y/N): [x]  Yes []  No     Date of Patient follow up with Physician:      Progress Report: []  Yes  [x]  No     Date Range for reporting period:  Beginning: 3/28  Ending:     Progress report due (10 Rx/or 30 days whichever is less): visit #10 or  (date)     Recertification due (POC duration/ or 90 days whichever is less): visit #24 or  (date)                                             Visit # Insurance Allowable Auth required? Date Range    []  Yes  [x]  No        Units approved Units used Date Range          Latex Allergy:  [x]NO      []YES  Preferred Language for Healthcare:   [x]English       []other:    Functional Scale:           Date assessed:  [facility] physical FS primary measure score = 20; risk adjusted = 47  3/28    Pain level:  2/10      SUBJECTIVE: Pt states she is getting surgery next week and MD may have to do a gentle manipulation due to joint tightness. Pt reports she is feeling tight in her calf today but other than that she feels good.  Pt states she had shooting pain in her ankle after last session but states it could be weather related. OBJECTIVE:     RESTRICTIONS/PRECAUTIONS: L ankle ORIF due to trimalleolar fx on 2/14; WBAT with boot starting 4/2/2022    Exercises/Interventions:     Therapeutic Exercise (99020)  Resistance / level Sets/sec Reps Notes / Cues   Seated Gastroc/Soleus Stretch  30\" 5    EOB PF/DF  peach 1 20    EOB IV/EV peach 1 20    Towel Scrunches  Seated HR/TR  Hamstring stretch  30\" 3 4/4   Sidelying clamshells orange 1 20 4/18   Sidelying SLR ABD  1 20 4/18          Heel slide supine  Therapeutic Activities (99126)       Gait training with increased weight bearing and crutches  Biodex Balance Limits of Stability-level 12,  score= 74% 8' 4/18- Hardest skill level (dots widest apart)                 Neuromuscular Re-ed (73874)       Rocker Board PF/DF/IV/EV  -double leg   -L only Square 2 20 each 3/30                 Manual Intervention (20007)       Knee mobs/PROM       Tib/Fem Mobs       Patella Mobs       Ankle mobs (distraction, PROM, calcaneal IV/EV)    Scar Massage 4/4   IASTM to calf and achilles   9' 4/13                                          Modalities:     Pt. Education:  -pt educated on diagnosis, prognosis and expectations for rehab  -all pt questions were answered    Home Exercise Program:     Access Code: [email or web address]  URL: [email or web address]  Date: 03/28/2022  Prepared by: Salbador Humphreys    Exercises  Supine Single Leg Ankle Pumps - 3 x daily - 7 x weekly - 20 reps  Supine Ankle Inversion Eversion AROM - 3 x daily - 7 x weekly - 20 reps  Long Sitting Calf Stretch with Strap - 3 x daily - 7 x weekly - 4 reps - 15 hold  Seated Toe Towel Scrunches - 3 x daily - 7 x weekly - 20 reps      Therapeutic Exercise and NMR EXR  [x] (76479) Provided verbal/tactile cueing for activities related to strengthening, flexibility, endurance, ROM for improvements in LE, proximal hip, and core control with self care, mobility, lifting, ambulation.  [] (67037) Provided verbal/tactile cueing for activities related to improving balance, coordination, kinesthetic sense, posture, motor skill, proprioception  to assist with LE, proximal hip, and core control in self care, mobility, lifting, ambulation and eccentric single leg control.   [] (39505) Therapist is in constant attendance of 2 or more patients providing skilled therapy interventions, but not providing any significant amount of measurable one-on-one time to either patient, for improvements in LE, proximal hip, and core control in self care, mobility, lifting, ambulation and eccentric single leg control.      NMR and Therapeutic Activities:    [x] (05905 or 29266) Provided verbal/tactile cueing for activities related to improving balance, coordination, kinesthetic sense, posture, motor skill, proprioception and motor activation to allow for proper function of core, proximal hip and LE with self care and ADLs  [] (52260) Gait Re-education- Provided training and instruction to the patient for proper LE, core and proximal hip recruitment and positioning and eccentric body weight control with ambulation re-education including up and down stairs     Home Exercise Program:    [] (25353) Reviewed/Progressed HEP activities related to strengthening, flexibility, endurance, ROM of core, proximal hip and LE for functional self-care, mobility, lifting and ambulation/stair navigation   [] (45903)Reviewed/Progressed HEP activities related to improving balance, coordination, kinesthetic sense, posture, motor skill, proprioception of core, proximal hip and LE for self care, mobility, lifting, and ambulation/stair navigation      Manual Treatments:  PROM / STM / Oscillations-Mobs:  G-I, II, III, IV (PA's, Inf., Post.)  [x] (99853) Provided manual therapy to mobilize LE, proximal hip and/or LS spine soft tissue/joints for the purpose of modulating pain, promoting relaxation,  increasing ROM, reducing/eliminating soft tissue swelling/inflammation/restriction, improving soft tissue extensibility and allowing for proper ROM for normal function with self care, mobility, lifting and ambulation. Modalities:  [] (54127) Vasopneumatic compression: Utilized vasopneumatic compression to decrease edema / swelling for the purpose of improving mobility and quad tone / recruitment which will allow for increased overall function including but not limited to self-care, transfers, ambulation, and ascending / descending stairs. Charges:  Timed Code Treatment Minutes: 41   Total Treatment Minutes: 41     [] EVAL - LOW (57718)   [] EVAL - MOD (91888)  [] EVAL - HIGH (68236)  [] RE-EVAL (73731)  [x] BT(09710) x   1    [] Ionto  [] NMR (03706) x          [] Vaso  [x] Manual (34378) x  1    [] Ultrasound  [x] TA x   1     [] Mech Traction (28923)  [] Aquatic Therapy x      [] ES (un) (91239):   [] Home Management Training x  [] ES(attended) (80516)   [] Dry Needling 1-2 muscles (76397):  [] Dry Needling 3+ muscles (775123  [] Group:      [] Other:     GOALS:  Patient stated goal: return to stair climbing, walking and playing with kids. [] Progressing: [] Met: [] Not Met: [] Adjusted    Therapist goals for Patient:   Short Term Goals: To be achieved in: 2 weeks  1. Independent in HEP and progression per patient tolerance, in order to prevent re-injury. [] Progressing: [] Met: [] Not Met: [] Adjusted  2. Patient will have a decrease in pain to facilitate improvement in movement, function, and ADLs as indicated by Functional Deficits. [] Progressing: [] Met: [] Not Met: [] Adjusted    Long Term Goals: To be achieved in: 12 weeks  1. FOTO score of at least 60 to assist with reaching prior level of function. [] Progressing: [] Met: [] Not Met: [] Adjusted  2. Patient will demonstrate increased L ankle dorsiflexion AROM to 0 degrees  to allow for proper joint functioning and ability negotiate stairs safely.     [] Progressing: [] Met: [] Not Met: [] Adjusted  3. Patient will demonstrate an increase in Strength to at least 4+/5 as well as good proximal hip strength and control to allow for proper functional mobility and safe ascent/descent of stairs. [] Progressing: [] Met: [] Not Met: [] Adjusted  4. Patient will return to functional activities including playing with her children without increased symptoms or restriction. [] Progressing: [] Met: [] Not Met: [] Adjusted  5. Pt will be able to maintain appropriate balance in L LE SLS for 15 seconds with no use of UE support to demonstrate safety in navigating stairs and other functional activities. [] Progressing: [] Met: [] Not Met: [] Adjusted         Overall Progression Towards Functional goals/ Treatment Progress Update:  [] Patient is progressing as expected towards functional goals listed. [] Progression is slowed due to complexities/Impairments listed. [] Progression has been slowed due to co-morbidities. [x] Plan just implemented, too soon to assess goals progression <30days   [] Goals require adjustment due to lack of progress  [] Patient is not progressing as expected and requires additional follow up with physician  [] Other    Persisting Functional Limitations/Impairments:  []Sitting [x]Standing   [x]Walking [x]Stairs   [x]Transfers [x]ADLs   [x]Squatting/bending []Kneeling  [x]Housework []Job related tasks  [x]Driving [x]Sports/Recreation   [x]Sleeping []Other:    ASSESSMENT: Pt completed today's therapy session with continued calf tightness, ankle ROM deficits, and ankle soreness with therapeutic exercise. IASTM was continued to relieve gastroc/soleus tightness and patient had increased redness this date. Pt educated on importance of IASTM and why it is helpful and patient states she felt much better after manual therapy. Pt is scheduled to have surgery to remove screws out of her foot next week. Pt completed [employer] limits of stability at hardest skill level and got a 74% this session.  Pt would continue to benefit from skilled therapy to maximize ankle strength and ROM, relieve tightness in the LE, and decrease pain for functional activity and ADLs. Treatment/Activity Tolerance:  [x] Pt able to complete treatment [] Patient limited by fatique  [] Patient limited by pain  [] Patient limited by other medical complications  [] Other:     Prognosis:  [x] Good [] Fair  [] Poor    Patient Requires Follow-up: [x] Yes  [] No    Return to Play:    [x]  N/A   []  Stage 1: Intro to Strength   []  Stage 2: Return to Run and Strength   []  Stage 3: Return to Jump and Strength   []  Stage 4: Dynamic Strength and Agility   []  Stage 5: Sport Specific Training     []  Ready to Return to Play, Meets All Above Stages   []  Not Ready for Return to Sports   Comments:            PLAN: See eval. PT 2x / week for 12 weeks. [x] Continue per plan of care [] Alter current plan (see comments)  [] Plan of care initiated [] Hold pending MD visit [] Discharge    Electronically signed by: Chau Mcintyre, PTA [address] observed and directed by Caron Bach PTA [id number]        Note: If patient does not return for scheduled/ recommended follow up visits, this note will serve as a discharge from care along with most recent update on progress.

## 2022-04-20 ENCOUNTER — HOSPITAL ENCOUNTER (OUTPATIENT)
Dept: PHYSICAL THERAPY | Age: 32
Setting detail: THERAPIES SERIES
Discharge: HOME | End: 2022-04-20
Payer: COMMERCIAL

## 2022-04-20 PROCEDURE — 97140 MANUAL THERAPY 1/> REGIONS: CPT

## 2022-04-20 PROCEDURE — 97110 THERAPEUTIC EXERCISES: CPT

## 2022-04-20 PROCEDURE — 97530 THERAPEUTIC ACTIVITIES: CPT

## 2022-04-20 NOTE — FLOWSHEET NOTE
Arjun Mendoza  Phone: [phone]   Fax: [phone]    Physical Therapy Treatment Note/ Progress Report:     Date:  2022    Patient Name:  Fatmata Jeong    :  1990  MRN: [id number]  Restrictions/Precautions:    Medical/Treatment Diagnosis Information:  Diagnosis: Z98.890, Z87.81 (ICD-10-CM) - S/P ORIF (open reduction internal fixation) [id number] (ICD-10-CM) - Closed trimalleolar fracture of left ankle with routine healing, subsequent encounter  Treatment Diagnosis: decreased ROM, strength, proprioception, balance impairments, reduced motor control, reduced participation in ADLs/IADLs, reduced tolerance for weight-bearing activities  Insurance/Certification information:  PT Insurance Information: [facility] [employer]--no auth required  Physician Information:  Referring Practitioner: Davey Otero MD  Plan of care signed (Y/N): [x]  Yes []  No     Date of Patient follow up with Physician:      Progress Report: []  Yes  [x]  No     Date Range for reporting period:  Beginning: 3/28  Ending:     Progress report due (10 Rx/or 30 days whichever is less): visit #10 or  (date)     Recertification due (POC duration/ or 90 days whichever is less): visit #24 or  (date)                                             Visit # Insurance Allowable Auth required? Date Range   7 30 []  Yes  [x]  No        Units approved Units used Date Range          Latex Allergy:  [x]NO      []YES  Preferred Language for Healthcare:   [x]English       []other:    Functional Scale:           Date assessed:  TO physical FS primary measure score = 20; risk adjusted = 47  3/28    Pain level:  3/10      SUBJECTIVE: pt reports spasms in the calf all day today. She has been wearing the boot except sleeping and walking with the crutches as much as possible. She reports no feeling of discomfort after IASTM.  Exercises at home are going well and she is doing extra. OBJECTIVE:     RESTRICTIONS/PRECAUTIONS: L ankle ORIF due to trimalleolar fx on 2/14; WBAT with boot starting 4/2/2022    Exercises/Interventions:     Therapeutic Exercise (31276)  Resistance / level Sets/sec Reps Notes / Cues   Seated Gastroc/Soleus Stretch  30\" 5    EOB PF/DF  peach    EOB IV/EV peach    Towel Scrunches  Seated HR/TR  Hamstring stretch  30\" 3 4/4   Sidelying clamshells orange 1 20 4/18   Sidelying SLR ABD  1 20 4/18          Heel slide supine  Therapeutic Activities (20375)       Gait training with increased weight bearing and crutches  Biodex Balance Limits of Stability-level 12,  score= 74%    LOS: stability level 11= 93%    LOS: stability level 12= 95%    LOS stability 6: 96%    LOS stability 1: 94% 8' 4/18- Hardest skill level (dots widest apart)                 Neuromuscular Re-ed (29731)       Rocker Board PF/DF/IV/EV    -L only Square 2 20 each 3/30                 Manual Intervention (57988)       Knee mobs/PROM       Tib/Fem Mobs       Patella Mobs       Ankle mobs (distraction, PROM, calcaneal IV/EV, talocural AP) Grade 1-2 10'    Scar Massage 4/4   IASTM to calf and achilles   10'  4/13                                          Modalities:     Pt. Education:  -pt educated on diagnosis, prognosis and expectations for rehab  -all pt questions were answered    Home Exercise Program:     Access Code: [email or web address]  URL: [email or web address]  Date: 04/20/2022  Prepared by: Juan Cee    Exercises  Seated Heel Raise - 1 x daily - 7 x weekly - 3 sets - 10 reps  Seated Heel Toe Raises - 1 x daily - 7 x weekly - 3 sets - 10 reps  Hooklying Isometric Clamshell - 1 x daily - 7 x weekly - 3 sets - 10 reps  Sidelying Hip Abduction - 1 x daily - 7 x weekly - 3 sets - 10 reps      Access Code: [email or web address]  URL: [email or web address]  Date: 03/28/2022  Prepared by: Juan Gent    Exercises  Supine Single Leg Ankle Pumps - 3 x daily - 7 x weekly - 20 reps  Supine coordination, kinesthetic sense, posture, motor skill, proprioception of core, proximal hip and LE for self care, mobility, lifting, and ambulation/stair navigation      Manual Treatments:  PROM / STM / Oscillations-Mobs:  G-I, II, III, IV (PA's, Inf., Post.)  [x] (87492) Provided manual therapy to mobilize LE, proximal hip and/or LS spine soft tissue/joints for the purpose of modulating pain, promoting relaxation,  increasing ROM, reducing/eliminating soft tissue swelling/inflammation/restriction, improving soft tissue extensibility and allowing for proper ROM for normal function with self care, mobility, lifting and ambulation. Modalities:  [] (29190) Vasopneumatic compression: Utilized vasopneumatic compression to decrease edema / swelling for the purpose of improving mobility and quad tone / recruitment which will allow for increased overall function including but not limited to self-care, transfers, ambulation, and ascending / descending stairs. Charges:  Timed Code Treatment Minutes: 45   Total Treatment Minutes: 45     [] EVAL - LOW (76957)   [] EVAL - MOD (36183)  [] EVAL - HIGH (33900)  [] RE-EVAL (54709)  [x] KX(89543) x   1    [] Ionto  [] NMR (64979) x          [] Vaso  [x] Manual (96770) x  1    [] Ultrasound  [x] TA x   1     [] Mech Traction (79930)  [] Aquatic Therapy x      [] ES (un) (44164):   [] Home Management Training x  [] ES(attended) (58500)   [] Dry Needling 1-2 muscles (63836):  [] Dry Needling 3+ muscles (433493  [] Group:      [] Other:     GOALS:  Patient stated goal: return to stair climbing, walking and playing with kids. [] Progressing: [] Met: [] Not Met: [] Adjusted    Therapist goals for Patient:   Short Term Goals: To be achieved in: 2 weeks  1. Independent in HEP and progression per patient tolerance, in order to prevent re-injury. [] Progressing: [] Met: [] Not Met: [] Adjusted  2.  Patient will have a decrease in pain to facilitate improvement in movement, function, and ADLs as indicated by Functional Deficits. [] Progressing: [] Met: [] Not Met: [] Adjusted    Long Term Goals: To be achieved in: 12 weeks  1. FOTO score of at least 60 to assist with reaching prior level of function. [] Progressing: [] Met: [] Not Met: [] Adjusted  2. Patient will demonstrate increased L ankle dorsiflexion AROM to 0 degrees  to allow for proper joint functioning and ability negotiate stairs safely. [] Progressing: [] Met: [] Not Met: [] Adjusted  3. Patient will demonstrate an increase in Strength to at least 4+/5 as well as good proximal hip strength and control to allow for proper functional mobility and safe ascent/descent of stairs. [] Progressing: [] Met: [] Not Met: [] Adjusted  4. Patient will return to functional activities including playing with her children without increased symptoms or restriction. [] Progressing: [] Met: [] Not Met: [] Adjusted  5. Pt will be able to maintain appropriate balance in L LE SLS for 15 seconds with no use of UE support to demonstrate safety in navigating stairs and other functional activities. [] Progressing: [] Met: [] Not Met: [] Adjusted         Overall Progression Towards Functional goals/ Treatment Progress Update:  [] Patient is progressing as expected towards functional goals listed. [] Progression is slowed due to complexities/Impairments listed. [] Progression has been slowed due to co-morbidities.   [x] Plan just implemented, too soon to assess goals progression <30days   [] Goals require adjustment due to lack of progress  [] Patient is not progressing as expected and requires additional follow up with physician  [] Other    Persisting Functional Limitations/Impairments:  []Sitting [x]Standing   [x]Walking [x]Stairs   [x]Transfers [x]ADLs   [x]Squatting/bending []Kneeling  [x]Housework []Job related tasks  [x]Driving [x]Sports/Recreation   [x]Sleeping []Other:    ASSESSMENT: Pt tolerated interventions well today with extensive focus on manual techniques to relieve some tightness in the calf and Achilles. Pt is having surgery on Monday to have the medial screw removed as well as the syndesmotic screw as she is losing insurance coverage at the end of this month. She was provided an updated HEP to continue post-surgery until her session on Wed next week. Focus of today's session was on ROM and control using the rocker board as well as improving her balance on the Biodex. She has some difficulty with weight shifting onto her left side while in the boot. She reports that MD said following the surgery she will likely be in a shoe and out of the boot WBAT. Pt will continue to benefit from PT interventions in order to maximize ROM, strength, gait mechanics, tolerance for weight bearing activities and ADL participation. Treatment/Activity Tolerance:  [x] Pt able to complete treatment [] Patient limited by fatique  [] Patient limited by pain  [] Patient limited by other medical complications  [] Other:     Prognosis:  [x] Good [] Fair  [] Poor    Patient Requires Follow-up: [x] Yes  [] No    Return to Play:    [x]  N/A   []  Stage 1: Intro to Strength   []  Stage 2: Return to Run and Strength   []  Stage 3: Return to Jump and Strength   []  Stage 4: Dynamic Strength and Agility   []  Stage 5: Sport Specific Training     []  Ready to Return to Play, Meets All Above Stages   []  Not Ready for Return to Sports   Comments:            PLAN: See eval. PT 2x / week for 12 weeks. [x] Continue per plan of care [] Alter current plan (see comments)  [] Plan of care initiated [] Hold pending MD visit [] Discharge    Electronically signed by: Kendra Bhardwaj, PT, DPT, OMT-C  Therapist was present, directed the patient's care, made skilled judgement, and was responsible for assessment and treatment of the patient.     Pricila Erickson SPT        Note: If patient does not return for scheduled/ recommended follow up visits, this note will serve as a discharge from care along with most recent update on progress.

## 2022-04-20 NOTE — PROGRESS NOTES
scheduled for surgery and plan to stay at the hospital until the child is discharged. Please do not bring other children with you. For your comfort, please wear simple loose fitting clothing to the hospital.  Please do not bring valuables. Do not wear any make-up or nail polish on your fingers or toes. For your safety, please do not wear any jewelry or body piercing's on the day of surgery. All jewelry must be removed. If you have dentures, they will be removed before going to operating room. For your convenience, we will provide you with a container. If you wear contact lenses or glasses, they will be removed, please bring a case for them. If you have a living will and a durable power of  for healthcare, please bring in a copy. As part of our patient safety program to minimize surgical site infections, we ask you to do the following:    · Please notify your surgeon if you develop any illness between         now and the day of your surgery. · This includes a cough, cold, fever, sore throat, nausea,         or vomiting, and diarrhea, etc.  ·  Please notify your surgeon if you experience dizziness, shortness         of breath or blurred vision between now and the time of your surgery. Do not shave your operative site 96 hours prior to surgery. For face and neck surgery, men may use an electric razor 48 hours   prior to surgery. You may shower the night before surgery or the morning of   your surgery with an antibacterial soap. You will need to bring a photo ID and insurance card     If you use a C-pap or Bi-pap machine, please bring your machine with you to the hospital     Our goal is to provide you with excellent care, therefore, visitors will be limited to so that we may focus on providing this care for you. Please contact your surgeon office, if you have any further questions.                  [facility] phone number:  [phone] PAT fax number: [phone]    Please note these are generalized instructions for all surgical cases, you may be provided with more specific instructions according to your surgery.

## 2022-04-22 ENCOUNTER — ANESTHESIA EVENT (OUTPATIENT)
Dept: OPERATING ROOM | Age: 32
End: 2022-04-22
Payer: COMMERCIAL

## 2022-04-25 ENCOUNTER — APPOINTMENT (OUTPATIENT)
Dept: GENERAL RADIOLOGY | Age: 32
End: 2022-04-25
Attending: ORTHOPAEDIC SURGERY
Payer: COMMERCIAL

## 2022-04-25 ENCOUNTER — APPOINTMENT (OUTPATIENT)
Dept: PHYSICAL THERAPY | Age: 32
End: 2022-04-25
Payer: COMMERCIAL

## 2022-04-25 ENCOUNTER — ANESTHESIA (OUTPATIENT)
Dept: OPERATING ROOM | Age: 32
End: 2022-04-25
Payer: COMMERCIAL

## 2022-04-25 ENCOUNTER — HOSPITAL ENCOUNTER (OUTPATIENT)
Age: 32
Setting detail: OUTPATIENT SURGERY
Discharge: HOME | End: 2022-04-25
Attending: ORTHOPAEDIC SURGERY | Admitting: ORTHOPAEDIC SURGERY
Payer: COMMERCIAL

## 2022-04-25 VITALS
DIASTOLIC BLOOD PRESSURE: 74 MMHG | SYSTOLIC BLOOD PRESSURE: 123 MMHG | HEIGHT: 68 IN | RESPIRATION RATE: 18 BRPM | WEIGHT: 193 LBS | HEART RATE: 69 BPM | TEMPERATURE: 97 F | OXYGEN SATURATION: 98 % | BODY MASS INDEX: 29.25 KG/M2

## 2022-04-25 VITALS
RESPIRATION RATE: 2 BRPM | OXYGEN SATURATION: 100 % | DIASTOLIC BLOOD PRESSURE: 53 MMHG | SYSTOLIC BLOOD PRESSURE: 89 MMHG | TEMPERATURE: 95.9 F

## 2022-04-25 PROCEDURE — 2500000003 HC RX 250 WO HCPCS: Performed by: NURSE ANESTHETIST, CERTIFIED REGISTERED

## 2022-04-25 PROCEDURE — 7100000000 HC PACU RECOVERY - FIRST 15 MIN: Performed by: ORTHOPAEDIC SURGERY

## 2022-04-25 PROCEDURE — 6370000000 HC RX 637 (ALT 250 FOR IP): Performed by: ANESTHESIOLOGY

## 2022-04-25 PROCEDURE — 2709999900 HC NON-CHARGEABLE SUPPLY: Performed by: ORTHOPAEDIC SURGERY

## 2022-04-25 PROCEDURE — 6360000002 HC RX W HCPCS: Performed by: NURSE ANESTHETIST, CERTIFIED REGISTERED

## 2022-04-25 PROCEDURE — 3700000000 HC ANESTHESIA ATTENDED CARE: Performed by: ORTHOPAEDIC SURGERY

## 2022-04-25 PROCEDURE — 3700000001 HC ADD 15 MINUTES (ANESTHESIA): Performed by: ORTHOPAEDIC SURGERY

## 2022-04-25 PROCEDURE — 6360000002 HC RX W HCPCS: Performed by: ORTHOPAEDIC SURGERY

## 2022-04-25 PROCEDURE — 3600000014 HC SURGERY LEVEL 4 ADDTL 15MIN: Performed by: ORTHOPAEDIC SURGERY

## 2022-04-25 PROCEDURE — 7100000001 HC PACU RECOVERY - ADDTL 15 MIN: Performed by: ORTHOPAEDIC SURGERY

## 2022-04-25 PROCEDURE — 3600000004 HC SURGERY LEVEL 4 BASE: Performed by: ORTHOPAEDIC SURGERY

## 2022-04-25 PROCEDURE — 2580000003 HC RX 258: Performed by: ANESTHESIOLOGY

## 2022-04-25 PROCEDURE — 7100000010 HC PHASE II RECOVERY - FIRST 15 MIN: Performed by: ORTHOPAEDIC SURGERY

## 2022-04-25 PROCEDURE — 2580000003 HC RX 258: Performed by: ORTHOPAEDIC SURGERY

## 2022-04-25 PROCEDURE — 6360000002 HC RX W HCPCS: Performed by: ANESTHESIOLOGY

## 2022-04-25 PROCEDURE — 73600 X-RAY EXAM OF ANKLE: CPT

## 2022-04-25 PROCEDURE — 3209999900 FLUORO FOR SURGICAL PROCEDURES

## 2022-04-25 PROCEDURE — 2500000003 HC RX 250 WO HCPCS: Performed by: ORTHOPAEDIC SURGERY

## 2022-04-25 PROCEDURE — A4217 STERILE WATER/SALINE, 500 ML: HCPCS | Performed by: ORTHOPAEDIC SURGERY

## 2022-04-25 PROCEDURE — 7100000011 HC PHASE II RECOVERY - ADDTL 15 MIN: Performed by: ORTHOPAEDIC SURGERY

## 2022-04-25 RX ORDER — BUPIVACAINE HYDROCHLORIDE 5 MG/ML
INJECTION, SOLUTION EPIDURAL; INTRACAUDAL
Status: COMPLETED | OUTPATIENT
Start: 2022-04-25 | End: 2022-04-25

## 2022-04-25 RX ORDER — MAGNESIUM HYDROXIDE 1200 MG/15ML
LIQUID ORAL CONTINUOUS PRN
Status: COMPLETED | OUTPATIENT
Start: 2022-04-25 | End: 2022-04-25

## 2022-04-25 RX ORDER — PROPOFOL 10 MG/ML
INJECTION, EMULSION INTRAVENOUS PRN
Status: DISCONTINUED | OUTPATIENT
Start: 2022-04-25 | End: 2022-04-25 | Stop reason: SDUPTHER

## 2022-04-25 RX ORDER — LIDOCAINE HYDROCHLORIDE 20 MG/ML
INJECTION, SOLUTION EPIDURAL; INFILTRATION; INTRACAUDAL; PERINEURAL PRN
Status: DISCONTINUED | OUTPATIENT
Start: 2022-04-25 | End: 2022-04-25 | Stop reason: SDUPTHER

## 2022-04-25 RX ORDER — MEPERIDINE HYDROCHLORIDE 25 MG/ML
12.5 INJECTION INTRAMUSCULAR; INTRAVENOUS; SUBCUTANEOUS EVERY 5 MIN PRN
Status: DISCONTINUED | OUTPATIENT
Start: 2022-04-25 | End: 2022-04-25 | Stop reason: HOSPADM

## 2022-04-25 RX ORDER — ONDANSETRON 2 MG/ML
4 INJECTION INTRAMUSCULAR; INTRAVENOUS
Status: DISCONTINUED | OUTPATIENT
Start: 2022-04-25 | End: 2022-04-25 | Stop reason: HOSPADM

## 2022-04-25 RX ORDER — DEXAMETHASONE SODIUM PHOSPHATE 4 MG/ML
INJECTION, SOLUTION INTRA-ARTICULAR; INTRALESIONAL; INTRAMUSCULAR; INTRAVENOUS; SOFT TISSUE PRN
Status: DISCONTINUED | OUTPATIENT
Start: 2022-04-25 | End: 2022-04-25 | Stop reason: SDUPTHER

## 2022-04-25 RX ORDER — SODIUM CHLORIDE 0.9 % (FLUSH) 0.9 %
5-40 SYRINGE (ML) INJECTION EVERY 12 HOURS SCHEDULED
Status: DISCONTINUED | OUTPATIENT
Start: 2022-04-25 | End: 2022-04-25 | Stop reason: HOSPADM

## 2022-04-25 RX ORDER — SODIUM CHLORIDE 9 MG/ML
INJECTION, SOLUTION INTRAVENOUS PRN
Status: DISCONTINUED | OUTPATIENT
Start: 2022-04-25 | End: 2022-04-25 | Stop reason: HOSPADM

## 2022-04-25 RX ORDER — ACETAMINOPHEN 500 MG
1000 TABLET ORAL ONCE
Status: COMPLETED | OUTPATIENT
Start: 2022-04-25 | End: 2022-04-25

## 2022-04-25 RX ORDER — DROPERIDOL 2.5 MG/ML
0.62 INJECTION, SOLUTION INTRAMUSCULAR; INTRAVENOUS
Status: DISCONTINUED | OUTPATIENT
Start: 2022-04-25 | End: 2022-04-25 | Stop reason: HOSPADM

## 2022-04-25 RX ORDER — SCOLOPAMINE TRANSDERMAL SYSTEM 1 MG/1
1 PATCH, EXTENDED RELEASE TRANSDERMAL ONCE
Status: DISCONTINUED | OUTPATIENT
Start: 2022-04-25 | End: 2022-04-25 | Stop reason: HOSPADM

## 2022-04-25 RX ORDER — SODIUM CHLORIDE 0.9 % (FLUSH) 0.9 %
5-40 SYRINGE (ML) INJECTION PRN
Status: DISCONTINUED | OUTPATIENT
Start: 2022-04-25 | End: 2022-04-25 | Stop reason: HOSPADM

## 2022-04-25 RX ORDER — ONDANSETRON 2 MG/ML
INJECTION INTRAMUSCULAR; INTRAVENOUS PRN
Status: DISCONTINUED | OUTPATIENT
Start: 2022-04-25 | End: 2022-04-25 | Stop reason: SDUPTHER

## 2022-04-25 RX ORDER — MIDAZOLAM HYDROCHLORIDE 1 MG/ML
INJECTION INTRAMUSCULAR; INTRAVENOUS PRN
Status: DISCONTINUED | OUTPATIENT
Start: 2022-04-25 | End: 2022-04-25 | Stop reason: SDUPTHER

## 2022-04-25 RX ORDER — FENTANYL CITRATE 50 UG/ML
INJECTION, SOLUTION INTRAMUSCULAR; INTRAVENOUS PRN
Status: DISCONTINUED | OUTPATIENT
Start: 2022-04-25 | End: 2022-04-25 | Stop reason: SDUPTHER

## 2022-04-25 RX ORDER — FENTANYL CITRATE 50 UG/ML
25 INJECTION, SOLUTION INTRAMUSCULAR; INTRAVENOUS EVERY 5 MIN PRN
Status: DISCONTINUED | OUTPATIENT
Start: 2022-04-25 | End: 2022-04-25 | Stop reason: HOSPADM

## 2022-04-25 RX ORDER — OXYCODONE HYDROCHLORIDE 5 MG/1
5 TABLET ORAL
Status: DISCONTINUED | OUTPATIENT
Start: 2022-04-25 | End: 2022-04-25 | Stop reason: HOSPADM

## 2022-04-25 RX ORDER — SODIUM CHLORIDE 9 MG/ML
INJECTION, SOLUTION INTRAVENOUS CONTINUOUS
Status: DISCONTINUED | OUTPATIENT
Start: 2022-04-25 | End: 2022-04-25 | Stop reason: HOSPADM

## 2022-04-25 RX ADMIN — ONDANSETRON 4 MG: 2 INJECTION INTRAMUSCULAR; INTRAVENOUS at 12:59

## 2022-04-25 RX ADMIN — FENTANYL CITRATE 50 MCG: 50 INJECTION INTRAMUSCULAR; INTRAVENOUS at 13:05

## 2022-04-25 RX ADMIN — PROPOFOL 200 MG: 10 INJECTION, EMULSION INTRAVENOUS at 12:49

## 2022-04-25 RX ADMIN — PROPOFOL 100 MG: 10 INJECTION, EMULSION INTRAVENOUS at 12:50

## 2022-04-25 RX ADMIN — MEPERIDINE HYDROCHLORIDE 12.5 MG: 25 INJECTION INTRAMUSCULAR; INTRAVENOUS; SUBCUTANEOUS at 13:52

## 2022-04-25 RX ADMIN — FENTANYL CITRATE 50 MCG: 50 INJECTION INTRAMUSCULAR; INTRAVENOUS at 12:47

## 2022-04-25 RX ADMIN — SODIUM CHLORIDE: 9 INJECTION, SOLUTION INTRAVENOUS at 10:32

## 2022-04-25 RX ADMIN — HYDROMORPHONE HYDROCHLORIDE 0.5 MG: 1 INJECTION, SOLUTION INTRAMUSCULAR; INTRAVENOUS; SUBCUTANEOUS at 14:15

## 2022-04-25 RX ADMIN — DEXAMETHASONE SODIUM PHOSPHATE 8 MG: 4 INJECTION, SOLUTION INTRAMUSCULAR; INTRAVENOUS at 12:54

## 2022-04-25 RX ADMIN — LIDOCAINE HYDROCHLORIDE 80 MG: 20 INJECTION, SOLUTION EPIDURAL; INFILTRATION; INTRACAUDAL; PERINEURAL at 12:49

## 2022-04-25 RX ADMIN — CEFAZOLIN 2000 MG: 10 INJECTION, POWDER, FOR SOLUTION INTRAVENOUS at 12:43

## 2022-04-25 RX ADMIN — ACETAMINOPHEN 1000 MG: 500 TABLET ORAL at 10:38

## 2022-04-25 RX ADMIN — HYDROMORPHONE HYDROCHLORIDE 0.5 MG: 1 INJECTION, SOLUTION INTRAMUSCULAR; INTRAVENOUS; SUBCUTANEOUS at 13:57

## 2022-04-25 RX ADMIN — SODIUM CHLORIDE: 9 INJECTION, SOLUTION INTRAVENOUS at 12:58

## 2022-04-25 RX ADMIN — MIDAZOLAM 2 MG: 1 INJECTION INTRAMUSCULAR; INTRAVENOUS at 12:43

## 2022-04-25 ASSESSMENT — PULMONARY FUNCTION TESTS
PIF_VALUE: 12
PIF_VALUE: 12
PIF_VALUE: 0
PIF_VALUE: 12
PIF_VALUE: 2
PIF_VALUE: 12
PIF_VALUE: 1
PIF_VALUE: 2
PIF_VALUE: 2
PIF_VALUE: 5
PIF_VALUE: 12
PIF_VALUE: 2
PIF_VALUE: 6
PIF_VALUE: 5
PIF_VALUE: 2
PIF_VALUE: 2
PIF_VALUE: 12
PIF_VALUE: 5
PIF_VALUE: 2
PIF_VALUE: 5
PIF_VALUE: 2
PIF_VALUE: 2
PIF_VALUE: 12
PIF_VALUE: 2
PIF_VALUE: 12
PIF_VALUE: 2
PIF_VALUE: 12
PIF_VALUE: 2
PIF_VALUE: 19
PIF_VALUE: 2
PIF_VALUE: 12
PIF_VALUE: 2
PIF_VALUE: 3
PIF_VALUE: 2
PIF_VALUE: 2
PIF_VALUE: 8
PIF_VALUE: 3
PIF_VALUE: 1
PIF_VALUE: 12
PIF_VALUE: 2
PIF_VALUE: 4
PIF_VALUE: 1
PIF_VALUE: 9

## 2022-04-25 ASSESSMENT — PAIN DESCRIPTION - ONSET
ONSET: ON-GOING

## 2022-04-25 ASSESSMENT — PAIN - FUNCTIONAL ASSESSMENT
PAIN_FUNCTIONAL_ASSESSMENT: NONE - DENIES PAIN
PAIN_FUNCTIONAL_ASSESSMENT: ACTIVITIES ARE NOT PREVENTED
PAIN_FUNCTIONAL_ASSESSMENT: ACTIVITIES ARE NOT PREVENTED

## 2022-04-25 ASSESSMENT — PAIN SCALES - GENERAL
PAINLEVEL_OUTOF10: 0
PAINLEVEL_OUTOF10: 7
PAINLEVEL_OUTOF10: 3
PAINLEVEL_OUTOF10: 4
PAINLEVEL_OUTOF10: 4
PAINLEVEL_OUTOF10: 9

## 2022-04-25 ASSESSMENT — PAIN DESCRIPTION - LOCATION
LOCATION: ANKLE

## 2022-04-25 ASSESSMENT — PAIN DESCRIPTION - FREQUENCY
FREQUENCY: CONTINUOUS

## 2022-04-25 ASSESSMENT — PAIN DESCRIPTION - ORIENTATION
ORIENTATION: LEFT

## 2022-04-25 ASSESSMENT — PAIN DESCRIPTION - DESCRIPTORS
DESCRIPTORS: ACHING
DESCRIPTORS: SHARP
DESCRIPTORS: ACHING
DESCRIPTORS: SHARP
DESCRIPTORS: SHARP

## 2022-04-25 ASSESSMENT — PAIN DESCRIPTION - PAIN TYPE
TYPE: SURGICAL PAIN

## 2022-04-25 NOTE — ANESTHESIA POSTPROCEDURE EVALUATION
Department of Anesthesiology  Postprocedure Note    Patient: Cristy Lilly  MRN: [id number]  YOB: 1990  Date of evaluation: 4/25/2022  Time:  3:07 PM     Procedure Summary     Date: 04/25/22 Room / Location: [address] [place] [address]    Anesthesia Start: 9427 Anesthesia Stop: 8206    Procedure: REMOVAL SYNDESMOTIC SCREW, REMOVAL MEDIAL SCREW LEFT ANKLE (Left ) Diagnosis:       Painful orthopaedic hardware ([address]      (PAINFUL HARDWARE LEFT ANKLE)    Surgeons: Juju Mata MD Responsible Provider: Georgia Escobar MD    Anesthesia Type: general ASA Status: 2          Anesthesia Type: general    Ubaldo Phase I: Ubaldo Score: 10    Ubaldo Phase II: Ubaldo Score: 10    Last vitals: Reviewed and per EMR flowsheets.        Anesthesia Post Evaluation    Patient location during evaluation: PACU  Patient participation: complete - patient participated  Level of consciousness: awake  Airway patency: patent  Nausea & Vomiting: no nausea and no vomiting  Cardiovascular status: blood pressure returned to baseline  Respiratory status: acceptable  Hydration status: stable  Multimodal analgesia pain management approach

## 2022-04-25 NOTE — PROGRESS NOTES
To pacu from OR. Pt asleep. Wakes briefly. Shivering. Pt denies pain. Dressing to left ankle dry and intact. Toes to left foot warm with brisk cap refill. Left popliteal pulse palpable. IV infusing. Monitor in sinus rhythm.

## 2022-04-25 NOTE — OP NOTE
Operative Note      Patient: Jean Obrien  YOB: 1990  MRN: [id number]    Date of Procedure: 4/25/2022    Pre-Op Diagnosis: PAINFUL HARDWARE LEFT ANKLE/retained syndesmotic screw and medial malleolar screw    Post-Op Diagnosis: Same       Procedure(s):  REMOVAL SYNDESMOTIC SCREW, REMOVAL MEDIAL SCREW LEFT ANKLE #2 intraoperative biplanar C arm fluoroscopic evaluation and interpretation    Surgeon(s):  Denis Collins MD    Assistant:   * No surgical staff found *    Anesthesia: Monitor Anesthesia Care    Estimated Blood Loss (mL): Minimal    Complications: None    Specimens:   * No specimens in log *    Implants:  * No implants in log *      Drains: * No LDAs found *    Findings: The mortise was intact and stable after syndesmotic screw removal.    Detailed Description of Procedure:   History: The patient is a 59-year-old female who sustained a severely comminuted left ankle fracture dislocation approximately 2-1/2 months ago. She underwent open reduction and internal fixation of the medial and lateral malleolus with syndesmotic screw fixation at that time. She went on to heal her fractures and has been having some pain over the medial screw. She has also had some dorsiflexion loss. Due to this fact we ultimately scheduled her and cleared her for syndesmotic screw removal and medial malleolar screw removal.  The patient understood the risks and benefits of the procedure and agreed to proceed. Report: The patient was identified preoperatively. The patient was then taken the operating room and general anesthesia was administered by anesthesia. A nonsterile tourniquet was applied to the left thigh. Appropriate preoperative antibiotics were administered per SCIP measures. The left lower extremity was then ChloraPrep in standard fashion. We then draped out in standard fashion. We used the large C arm throughout the entire procedure.   Intraoperative C arm fluoroscopic evaluation and interpretation was performed throughout the case. We first identified the location of the syndesmotic screw. We made a 1 to 2 cm incision centered over the syndesmotic screw laterally. We first infiltrated our incision site with half percent Marcaine plain. We incised skin and coagulate all bleeders of Bovie electrocautery. We dissected down and identified the syndesmotic screw. We then backed out the syndesmotic screw without difficulty. We then directed our attention medially over the medial malleolar screw. We used the large C arm to identify the screw. We then made a 1 cm incision centered over the medial malleolar screw. We incised skin and coagulated all bleeders with Bovie electrocautery. We dissected down and got down to the screw. We then backed out the medial malleolar screw without difficulty. We used the large C arm to evaluate the ankle after hardware removal.  The fractures were well-healed. The mortise was intact and stable. We irrigated all areas rather copiously. We closed the subcutaneous tissues both medially and laterally with interrupted 2-0 simple Vicryl stitches. We then closed the skin with interrupted horizontal mattress 3-0 nylon sutures. Sterile dressings were applied. Tegaderm dressings were utilized. We then wrapped the ankle with an Ace wrap. The patient may remove the Ace wrap tomorrow morning down to the Tegaderm dressings.     Electronically signed by Sawyer Whyte MD on 4/25/2022 at 11:58 AM

## 2022-04-25 NOTE — ANESTHESIA PRE PROCEDURE
Department of Anesthesiology  Preprocedure Note       Name:  Carlita Church   Age:  32 y.o.  :  1990                                          MRN:  [id number]         Date:  2022      Surgeon: Juan Pace):  Aristides Sewell MD    Procedure: Procedure(s):  REMOVAL SYNDESMOTIC SCREW, REMOVAL MEDIAL SCREW LEFT ANKLE    Medications prior to admission:   Prior to Admission medications    Not on File       Current medications:    Current Facility-Administered Medications   Medication Dose Route Frequency Provider Last Rate Last Admin    ceFAZolin (ANCEF) 2000 mg in dextrose 5 % 100 mL IVPB  2,000 mg IntraVENous Once Aristides Sewell MD        0.9 % sodium chloride infusion   IntraVENous Continuous Emmy Arreola MD        sodium chloride flush 0.9 % injection 5-40 mL  5-40 mL IntraVENous 2 times per day Emmy Arreola MD        sodium chloride flush 0.9 % injection 5-40 mL  5-40 mL IntraVENous PRN Emmy Arreola MD        0.9 % sodium chloride infusion   IntraVENous PRN Emmy Arreola MD        acetaminophen (TYLENOL) tablet 1,000 mg  1,000 mg Oral Once Missy Abrams MD        scopolamine (TRANSDERM-SCOP) transdermal patch 1 patch  1 patch TransDERmal Once Missy Abrams MD           Allergies: Allergies   Allergen Reactions    Hydrocodone Nausea And Vomiting     CAN TAKE WITH ANTI-NAUSEA MEDICATION    Oxycodone Nausea And Vomiting     Can take with anti-nausea medication       Problem List:  There is no problem list on file for this patient.       Past Medical History:        Diagnosis Date    Anxiety     NO MEDS    Arthritis     Asthma     exercise induced    GERD (gastroesophageal reflux disease)     H/O migraine     History of gestational diabetes     IBS (irritable bowel syndrome)     Non-alcoholic fatty liver disease     PONV (postoperative nausea and vomiting)     WANTS ANTI NAUSEA PATCH PRE-OP       Past Surgical History:        Procedure Laterality Date    ANKLE FRACTURE SURGERY Left 2022    OPEN REDUCTION INTERNAL FIXATION LEFT ANKLE WITH SYNDESMOSIS SCREW FIXATION performed by Queen Rehan MD at [address]     HYSTERECTOMY      SHOULDER SURGERY Left     ligament repair-X4 SHOULDER SURGERIES       Social History:    Social History     Tobacco Use    Smoking status: Former Smoker     Packs/day: 0.50     Years: 18.00     Pack years: 9.00     Types: E-Cigarettes, Cigarettes     Quit date: 2018     Years since quittin.0    Smokeless tobacco: Never Used    Tobacco comment: quit cigarettes 4 years ago uses e cigarettes now   Substance Use Topics    Alcohol use: Yes     Comment: rare                                Counseling given: Not Answered  Comment: quit cigarettes 4 years ago uses e cigarettes now      Vital Signs (Current):   Vitals:    22 1254 22 1019 22 1027   BP:   (!) 133/92   Pulse:   75   Resp:   18   Temp:   97 °F (36.1 °C)   TempSrc:   Temporal   SpO2:   98%   Weight: 193 lb (87.5 kg) 193 lb (87.5 kg)    Height: 5' 8\" (1.727 m)                                                BP Readings from Last 3 Encounters:   22 (!) 133/92   22 120/60   22 (!) 125/56       NPO Status: Time of last liquid consumption:                         Time of last solid consumption:                         Date of last liquid consumption: 22                        Date of last solid food consumption: 22    BMI:   Wt Readings from Last 3 Encounters:   22 193 lb (87.5 kg)   04/15/22 192 lb (87.1 kg)   22 192 lb (87.1 kg)     Body mass index is 29.35 kg/m². CBC: No results found for: WBC, RBC, HGB, HCT, MCV, RDW, PLT    CMP: No results found for: NA, K, CL, CO2, BUN, CREATININE, GFRAA, AGRATIO, LABGLOM, GLUCOSE, GLU, PROT, CALCIUM, BILITOT, ALKPHOS, AST, ALT    POC Tests: No results for input(s): POCGLU, POCNA, POCK, POCCL, POCBUN, POCHEMO, POCHCT in the last 72 hours.     Coags: No results found for: PROTIME, INR, APTT    HCG (If Applicable): No results found for: PREGTESTUR, PREGSERUM, HCG, HCGQUANT     ABGs: No results found for: PHART, PO2ART, PYM8SBR, SFW1NBB, BEART, [id number]     Type & Screen (If Applicable):  No results found for: LABABO, LABRH    Drug/Infectious Status (If Applicable):  No results found for: HIV, HEPCAB    COVID-19 Screening (If Applicable):   Lab Results   Component Value Date    COVID19 Not Detected 02/14/2022           Anesthesia Evaluation  Patient summary reviewed   history of anesthetic complications: PONV. Airway: Mallampati: II  TM distance: >3 FB   Neck ROM: full  Mouth opening: > = 3 FB Dental: normal exam         Pulmonary: breath sounds clear to auscultation  (+) asthma:                            Cardiovascular:  Exercise tolerance: good (>4 METS),       (-) past MI, CAD, CABG/stent,  angina and  GARIBAY      Rhythm: regular  Rate: normal                    Neuro/Psych:   Negative Neuro/Psych ROS              GI/Hepatic/Renal:   (+) GERD:, liver disease ([place]):,           Endo/Other:                     Abdominal:   (+) obese,           Vascular: negative vascular ROS. Other Findings:           Anesthesia Plan      general     ASA 2     (General, PIV, Multimodal analgesia, PACU. I discussed with the patient the risks and benefits to general anesthesia including possible anesthetic complications but not limited to: PONV, damage to the airway and surrounding structures (teeth, lips, gums, tongue, etc.), adverse reactions to medications, cardiac complications (MI, CHF, arrhythmias, etc.), respiratory complications (post-op ventilation, respiratory failure, etc.), neurologic complications (nerve damage, stroke, seizure), the potential for conversion to a general anesthetic, and death. The patient was given the opportunity to ask questions and all questions were answered to the patient's satisfaction.  )  Induction: intravenous.       Anesthetic plan and risks discussed with patient. Plan discussed with CRNA. This pre-anesthesia assessment may be used as a history and physical.    DOS STAFF ADDENDUM:    Pt seen and examined, chart reviewed (including anesthesia, drug and allergy history). No interval changes to history and physical examination. Anesthetic plan, risks, benefits, alternatives, and personnel involved discussed with patient. Patient verbalized an understanding and agrees to proceed.       Laureen Hidalgo MD  April 25, 2022  10:32 AM

## 2022-04-25 NOTE — H&P
The patient was interviewed and examined and there have been no changes since the documented History and Physical.  I have presented reasonable alternatives to the patient's proposed care, treatment and services. The discussion I have done encompassed risks, benefits and side effects related to the alternatives and the risks related to not receiving the proposed care, treatment and services.     Electronically signed by Sesar Burt MD on 4/25/2022 at 11:57 AM

## 2022-04-26 NOTE — PROGRESS NOTES
Note from MD via Epic today 4/26, pt is clear to transition out of CAM walking boot and WBAT with regular shoe as tolerated this week.      Ismael Car PT, DPT, OMT-C

## 2022-04-27 ENCOUNTER — HOSPITAL ENCOUNTER (OUTPATIENT)
Dept: PHYSICAL THERAPY | Age: 32
Setting detail: THERAPIES SERIES
Discharge: HOME | End: 2022-04-27
Payer: COMMERCIAL

## 2022-04-27 PROCEDURE — 97140 MANUAL THERAPY 1/> REGIONS: CPT

## 2022-04-27 PROCEDURE — 97530 THERAPEUTIC ACTIVITIES: CPT

## 2022-04-27 NOTE — PROGRESS NOTES
Arjun Mendoza  Phone: [phone]   Fax: [phone]    Physical Therapy Re-Certification Plan of Care    Dear Linn Brooks MD,    We had the pleasure of treating the following patient for physical therapy services at [facility]. A summary of our findings can be found in the updated assessment below. This includes our plan of care. If you have any questions or concerns regarding these findings, please do not hesitate to contact me at the office phone number checked above. Thank you for the referral.     Physician Signature:________________________________Date:__________________  By signing above (or electronic signature), therapist's plan is approved by physician       PROM AROM     L R L R   Dorsiflexion     15 degrees from neutral 5   Plantarflexion      50 60   Inversion      20 35   Eversion      12 20     ASSESSMENT: Pt completed this therapy session with shooting pain near the incision site and calf tightness. IASTM was continued to improve calf flexibility for ankle ROM. AROM measurements showed improvement with PF, DF, and INV and slight improvement with eversion. Pt still is lacking 15 degrees from neutral for DF this session. Pt was informed about GAP program this date and pt will schedule appointments for the future. Manual therapy revealed forefoot tightness and pt complained of slight pain with mobilizations this session. Pt ambulated with boot and no crutches for the first time this date and she states she has no pain with activity. Pt was educated on proper way of ascending/descending stairs and pt practiced stair training and states she has no pain with activity just a fear when coming down the stairs. Pt would continue to benefit from skilled therapy to improve ankle strength, ROM, gait mechanics, and tolerance for weight bearing activities and functional activities. Due to insurance restrictions and overall health care benefits being terminated at the end of April, pt will f/u with formal PT and then transition to [facility] program 1 x per week for 6 weeks and f/u with PT prn. Will continue to work on strength, transition out of boot, balance control and walking mechanics. Overall Response to Treatment:   [x]Patient is responding well to treatment and improvement is noted with regards  to goals   []Patient should continue to improve in reasonable time if they continue HEP   []Patient has plateaued and is no longer responding to skilled PT intervention    []Patient is getting worse and would benefit from return to referring MD   []Patient unable to adhere to initial POC   []Other: -    Date range of Visits: 3/28 -   Total Visits: 7    Recommendation:    [x]Continue PT 1-2x / wk for 12 weeks. And will f/u with PT prn. Will transition over to [facility] program after next visit on  due to insurance change.                []Hold PT, pending MD visit          Physical Therapy Treatment Note/ Progress Report:     Date:  2022    Patient Name:  Jazmyn Elliott    :  1990  MRN: [id number]  Restrictions/Precautions:    Medical/Treatment Diagnosis Information:  Diagnosis: Z98.890, Z87.81 (ICD-10-CM) - S/P ORIF (open reduction internal fixation) [id number] (ICD-10-CM) - Closed trimalleolar fracture of left ankle with routine healing, subsequent encounter  Treatment Diagnosis: decreased ROM, strength, proprioception, balance impairments, reduced motor control, reduced participation in ADLs/IADLs, reduced tolerance for weight-bearing activities  Insurance/Certification information:  PT Insurance Information: [facility] [employer]--no auth required  Physician Information:  Referring Practitioner: Annalisa Basilio MD  Plan of care signed (Y/N): [x]  Yes []  No     Date of Patient follow up with Physician:      Progress Report: []  Yes  [x]  No     Date Range for reporting period:  Beginning: 3/28  Ending:     Progress report due (10 Rx/or 30 days whichever is less): visit #10 or 0/12 (date)     Recertification due (POC duration/ or 90 days whichever is less): visit #24 or 6/20 (date)                                             Visit # Insurance Allowable Auth required? Date Range   7 30 []  Yes  [x]  No        Units approved Units used Date Range          Latex Allergy:  [x]NO      []YES  Preferred Language for Healthcare:   [x]English       []other:    Functional Scale:           Date assessed:  [facility] physical FS primary measure score = 20; risk adjusted = 47  3/28    Pain level:  4/10      SUBJECTIVE: Pt reports having shooting pain on the incision sites that happens randomly. Had surgery Monday and states MD said she could walk without boot and crutches but pt states she is still hesitant to walk since she is still fresh out of surgery.      OBJECTIVE:      PROM AROM     L R L R   Dorsiflexion     15 degrees from neutral 5   Plantarflexion      50 60   Inversion      20 35   Eversion      12 20       RESTRICTIONS/PRECAUTIONS: L ankle ORIF due to trimalleolar fx on 2/14; WBAT with boot starting 4/2/2022    Exercises/Interventions:     Therapeutic Exercise (17203)  Resistance / level Sets/sec Reps Notes / Cues   Seated Gastroc/Soleus Stretch     EOB PF/DF  peach    EOB IV/EV peach    Towel Scrunches  Seated HR/TR  Hamstring stretch  30\" 3 4/4   Sidelying clamshells Sidelying SLR ABD        Heel slide supine  Therapeutic Activities (65864)       Gait training/stair training with no crutches  10'  4/27   Biodex Balance               Neuromuscular Re-ed (45186)       Rocker Board PF/DF/IV/EV    -L only               Manual Intervention (84023)       Knee mobs/PROM       Tib/Fem Mobs       Patella Mobs       Ankle mobs (distraction, PROM, calcaneal IV/EV, talocural AP) Grade 1-2 12'    Scar Massage 4/4   IASTM to calf and achilles   11' 4/13 Modalities:     Pt. Education:  -pt educated on diagnosis, prognosis and expectations for rehab  -all pt questions were answered    Home Exercise Program:     Access Code: [email or web address]  URL: [email or web address]  Date: 04/20/2022  Prepared by: Marla Mcgregor    Exercises  Seated Heel Raise - 1 x daily - 7 x weekly - 3 sets - 10 reps  Seated Heel Toe Raises - 1 x daily - 7 x weekly - 3 sets - 10 reps  Hooklying Isometric Clamshell - 1 x daily - 7 x weekly - 3 sets - 10 reps  Sidelying Hip Abduction - 1 x daily - 7 x weekly - 3 sets - 10 reps      Access Code: [email or web address]  URL: [facility]/  Date: 03/28/2022  Prepared by: Marla Mcgregor    Exercises  Supine Single Leg Ankle Pumps - 3 x daily - 7 x weekly - 20 reps  Supine Ankle Inversion Eversion AROM - 3 x daily - 7 x weekly - 20 reps  Long Sitting Calf Stretch with Strap - 3 x daily - 7 x weekly - 4 reps - 15 hold  Seated Toe Towel Scrunches - 3 x daily - 7 x weekly - 20 reps      Therapeutic Exercise and NMR EXR  [x] (77894) Provided verbal/tactile cueing for activities related to strengthening, flexibility, endurance, ROM for improvements in LE, proximal hip, and core control with self care, mobility, lifting, ambulation.  [] (92273) Provided verbal/tactile cueing for activities related to improving balance, coordination, kinesthetic sense, posture, motor skill, proprioception  to assist with LE, proximal hip, and core control in self care, mobility, lifting, ambulation and eccentric single leg control.   [] (48039) Therapist is in constant attendance of 2 or more patients providing skilled therapy interventions, but not providing any significant amount of measurable one-on-one time to either patient, for improvements in LE, proximal hip, and core control in self care, mobility, lifting, ambulation and eccentric single leg control.      NMR and Therapeutic Activities:    [x] (10893 or 91267) Provided verbal/tactile cueing for activities related to improving balance, coordination, kinesthetic sense, posture, motor skill, proprioception and motor activation to allow for proper function of core, proximal hip and LE with self care and ADLs  [] (69175) Gait Re-education- Provided training and instruction to the patient for proper LE, core and proximal hip recruitment and positioning and eccentric body weight control with ambulation re-education including up and down stairs     Home Exercise Program:    [] (55228) Reviewed/Progressed HEP activities related to strengthening, flexibility, endurance, ROM of core, proximal hip and LE for functional self-care, mobility, lifting and ambulation/stair navigation   [] (77173)Reviewed/Progressed HEP activities related to improving balance, coordination, kinesthetic sense, posture, motor skill, proprioception of core, proximal hip and LE for self care, mobility, lifting, and ambulation/stair navigation      Manual Treatments:  PROM / STM / Oscillations-Mobs:  G-I, II, III, IV (PA's, Inf., Post.)  [x] (39183) Provided manual therapy to mobilize LE, proximal hip and/or LS spine soft tissue/joints for the purpose of modulating pain, promoting relaxation,  increasing ROM, reducing/eliminating soft tissue swelling/inflammation/restriction, improving soft tissue extensibility and allowing for proper ROM for normal function with self care, mobility, lifting and ambulation. Modalities:  [] (77843) Vasopneumatic compression: Utilized vasopneumatic compression to decrease edema / swelling for the purpose of improving mobility and quad tone / recruitment which will allow for increased overall function including but not limited to self-care, transfers, ambulation, and ascending / descending stairs.        Charges:  Timed Code Treatment Minutes: 38   Total Treatment Minutes: 46     [] EVAL - LOW (50890)   [] EVAL - MOD (96895)  [] EVAL - HIGH (18731)  [] RE-EVAL (07847)  [] YW(48721) x       [] Ionto  [] NMR (79940) x          [] Vaso  [x] Manual (74570) x  2    [] Ultrasound  [x] TA x  1     [] Mech Traction (51792)  [] Aquatic Therapy x      [] ES (un) (84056):   [] Home Management Training x  [] ES(attended) (67342)   [] Dry Needling 1-2 muscles (06411):  [] Dry Needling 3+ muscles (220542  [] Group:      [] Other:      GOALS:  Patient stated goal: return to stair climbing, walking and playing with kids. [] Progressing: [] Met: [] Not Met: [] Adjusted    Therapist goals for Patient:   Short Term Goals: To be achieved in: 2 weeks  1. Independent in HEP and progression per patient tolerance, in order to prevent re-injury. [] Progressing: [] Met: [] Not Met: [] Adjusted  2. Patient will have a decrease in pain to facilitate improvement in movement, function, and ADLs as indicated by Functional Deficits. [] Progressing: [] Met: [] Not Met: [] Adjusted    Long Term Goals: To be achieved in: 12 weeks  1. FOTO score of at least 60 to assist with reaching prior level of function. [] Progressing: [] Met: [] Not Met: [] Adjusted  2. Patient will demonstrate increased L ankle dorsiflexion AROM to 0 degrees  to allow for proper joint functioning and ability negotiate stairs safely. 4/27: Lacking 15 from neutral  [] Progressing: [] Met: [] Not Met: [] Adjusted  3. Patient will demonstrate an increase in Strength to at least 4+/5 as well as good proximal hip strength and control to allow for proper functional mobility and safe ascent/descent of stairs. 4/27: Unable to formally assess due to recent screw removal and pain   [] Progressing: [] Met: [] Not Met: [] Adjusted  4. Patient will return to functional activities including playing with her children without increased symptoms or restriction. 4/27: Pt still unable to WBAT without AD due to recent screw removal surgery   [] Progressing: [] Met: [] Not Met: [] Adjusted  5.  Pt will be able to maintain appropriate balance in L LE SLS for 15 seconds with no use of UE support to demonstrate safety in navigating stairs and other functional activities. 4/27: Pt still unable to WBAT without AD due to recent screw removal surgery    [] Progressing: [] Met: [] Not Met: [] Adjusted         Overall Progression Towards Functional goals/ Treatment Progress Update:  [] Patient is progressing as expected towards functional goals listed. [] Progression is slowed due to complexities/Impairments listed. [] Progression has been slowed due to co-morbidities. [x] Plan just implemented, too soon to assess goals progression <30days   [] Goals require adjustment due to lack of progress  [] Patient is not progressing as expected and requires additional follow up with physician  [] Other    Persisting Functional Limitations/Impairments:  []Sitting [x]Standing   [x]Walking [x]Stairs   [x]Transfers [x]ADLs   [x]Squatting/bending []Kneeling  [x]Housework []Job related tasks  [x]Driving [x]Sports/Recreation   [x]Sleeping []Other:    ASSESSMENT: Pt completed this therapy session with shooting pain near the incision site and calf tightness. IASTM was continued to improve calf flexibility for ankle ROM. AROM measurements showed improvement with PF, DF, and INV and slight improvement with eversion. Pt still is lacking 15 degrees from neutral for DF this session. Pt was informed about GAP program this date and pt will schedule appointments for the future. Manual therapy revealed forefoot tightness and pt complained of slight pain with mobilizations this session. Pt ambulated with boot and no crutches for the first time this date and she states she has no pain with activity. Pt was educated on proper way of ascending/descending stairs and pt practiced stair training and states she has no pain with activity just a fear when coming down the stairs. Pt would continue to benefit from skilled therapy to improve ankle strength, ROM, gait mechanics, and tolerance for weight bearing activities and functional activities. Treatment/Activity Tolerance:  [x] Pt able to complete treatment [] Patient limited by fatique  [] Patient limited by pain  [] Patient limited by other medical complications  [] Other:     Prognosis:  [x] Good [] Fair  [] Poor    Patient Requires Follow-up: [x] Yes  [] No    Return to Play:    [x]  N/A   []  Stage 1: Intro to Strength   []  Stage 2: Return to Run and Strength   []  Stage 3: Return to Jump and Strength   []  Stage 4: Dynamic Strength and Agility   []  Stage 5: Sport Specific Training     []  Ready to Return to Play, Meets All Above Stages   []  Not Ready for Return to Sports   Comments:            PLAN: See eval. PT 2x / week for 12 weeks. After next visit on 4/29, Pt will transition to [facility] program as insurance termination due to change in job status at the end of April. [x] Continue per plan of care [] Alter current plan (see comments)  [] Plan of care initiated [] Hold pending MD visit [] Discharge    Electronically signed by: Karissa Koroma, PTA [address] observed and directed by Enmanuel Siddiqui PTA [id number]    Goal Assessment and POC written and adjusted by Ruba Toribio PT, DPT, OMT-C    Note: If patient does not return for scheduled/ recommended follow up visits, this note will serve as a discharge from care along with most recent update on progress.

## 2022-04-29 ENCOUNTER — HOSPITAL ENCOUNTER (OUTPATIENT)
Dept: PHYSICAL THERAPY | Age: 32
Setting detail: THERAPIES SERIES
Discharge: HOME | End: 2022-04-29
Payer: COMMERCIAL

## 2022-04-29 PROCEDURE — 97140 MANUAL THERAPY 1/> REGIONS: CPT

## 2022-04-29 PROCEDURE — 97110 THERAPEUTIC EXERCISES: CPT

## 2022-05-03 ENCOUNTER — OFFICE VISIT (OUTPATIENT)
Dept: ORTHOPEDIC SURGERY | Age: 32
End: 2022-05-03

## 2022-05-03 VITALS — BODY MASS INDEX: 29.25 KG/M2 | HEIGHT: 68 IN | WEIGHT: 193 LBS

## 2022-05-03 DIAGNOSIS — T84.84XA PAINFUL ORTHOPAEDIC HARDWARE (HCC): Primary | ICD-10-CM

## 2022-05-03 DIAGNOSIS — Z98.890 S/P ORIF (OPEN REDUCTION INTERNAL FIXATION) FRACTURE: ICD-10-CM

## 2022-05-03 DIAGNOSIS — Z87.81 S/P ORIF (OPEN REDUCTION INTERNAL FIXATION) FRACTURE: ICD-10-CM

## 2022-05-03 PROCEDURE — 99024 POSTOP FOLLOW-UP VISIT: CPT | Performed by: ORTHOPAEDIC SURGERY

## 2022-05-03 RX ORDER — ACETAMINOPHEN 325 MG/1
650 TABLET ORAL EVERY 6 HOURS PRN
COMMUNITY
End: 2022-07-20

## 2022-05-03 RX ORDER — IBUPROFEN 200 MG
200 TABLET ORAL EVERY 6 HOURS PRN
COMMUNITY
End: 2022-07-20

## 2022-05-03 NOTE — PROGRESS NOTES
Jean Obrien  [id number]  May 3, 2022    Chief Complaint   Patient presents with    Post-Op Check     REMOVAL SYNDESMOTIC SCREW, REMOVAL MEDIAL SCREW LEFT ANKLE; DOS 4/25/22. History: The patient is a 78-year-old female who is here for evaluation of her left ankle. She is now approximately 8 days status post hardware removal from the left ankle. She reports mild pain. She is out of the boot. She is approximately 3 months post original injury. The patient's  past medical history, medications, allergies,  family history, social history, and have been reviewed, and dated and are recorded in the chart. Pertinent items are noted in HPI. Review of systems reviewed from Pertinent History Form dated on 3/23/22 and available in the patient's chart under the Media tab. Vitals:  Ht 5' 8\" (1.727 m)   Wt 193 lb (87.5 kg)   BMI 29.35 kg/m²     Physical: On examination today, the patient is in no apparent distress. She is alert and oriented. She has mild swelling of the left ankle. There is no evidence of DVT. She is neurovascularly intact distally. Her incisions are well-healed. There is no evidence of drainage. We dorsiflex the left ankle to 20 degrees. She plantar flexes down to 25 degrees. X-rays: 3 views of the left ankle obtained in the office today were extensively reviewed. The ankle mortise is well reduced. The fibula fracture is essentially healed. The medial malleolus fracture is essentially healed   The medial malleolus fragment was very small. There is surgical absence of the medial screw. There is surgical absence of the syndesmotic screw as well. Impression: Status post open reduction and internal fixation of left ankle    Plan: The patient will continue to work on range of motion and strengthening. She will continue to weight-bear out of the boot. She will follow-up with me in 5 weeks and we will reassess her then.   At follow-up, final x-rays of left ankle will be obtained. We will then consider releasing the patient.

## 2022-05-04 ENCOUNTER — HOSPITAL ENCOUNTER (OUTPATIENT)
Dept: PHYSICAL THERAPY | Age: 32
Setting detail: THERAPIES SERIES
Discharge: HOME | End: 2022-05-04
Payer: COMMERCIAL

## 2022-05-04 PROCEDURE — 9990000027 HC GAP GROUP: Performed by: SPECIALIST/TECHNOLOGIST

## 2022-05-04 NOTE — FLOWSHEET NOTE
KellyArjun  Phone: [phone]   Fax: [phone]      Physical Therapy Treatment Note/ Progress Report:     Date:  2022    Patient Name:  Mica Grace    :  1990  MRN: [id number]  Restrictions/Precautions:    Medical/Treatment Diagnosis Information:  Diagnosis: Z98.890, Z87.81 (ICD-10-CM) - S/P ORIF (open reduction internal fixation) [id number] (ICD-10-CM) - Closed trimalleolar fracture of left ankle with routine healing, subsequent encounter  Treatment Diagnosis: decreased ROM, strength, proprioception, balance impairments, reduced motor control, reduced participation in ADLs/IADLs, reduced tolerance for weight-bearing activities  Insurance/Certification information:  PT Insurance Information: [employer] [facility] [employer]--no auth required  Physician Information:  Referring Practitioner: Mendel Ruddle, MD  Plan of care signed (Y/N): [x]  Yes []  No     Date of Patient follow up with Physician:      Progress Report: []  Yes  [x]  No     Date Range for reporting period:  Beginning: 3/28  Ending:     Progress report due (10 Rx/or 30 days whichever is less): visit #10 or 3/97 (date)     Recertification due (POC duration/ or 90 days whichever is less): visit #24 or  (date)                                             Visit # Gap session Auth required? Date Range   1  []  Yes  [x]  No        Latex Allergy:  [x]NO      []YES  Preferred Language for Healthcare:   [x]English       []other:    Functional Scale:           Date assessed:  [facility] physical FS primary measure score = 20; risk adjusted = 47  3/28    Pain level:  2/10  More stiffness     SUBJECTIVE: Dr Yamilka Lopez advised for her to progress out of boot and push strength levels more. Wore the boot this w/e  And still plans on wearing stocking PRN.    OBJECTIVE:      PROM AROM     L R L R   Dorsiflexion     15 degrees from neutral 5   Plantarflexion      50 60   Inversion      20 35   Eversion      12 20     5/4 incision closed medially and laterally but needs daily STM w/ Vit E.   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    Exercises/Interventions: 45'  Therapeutic Exercise (61316)  Resistance / level Sets/sec Reps Notes / Cues   Standing Gastroc/Soleus Stretch  30\" 3x  5/4 x2 positions   EOB PF/DF   lime 1 25x 5/4   EOB IV/EV lime 1 25x 5/4     HR/TR  5/4 advised begin  StandingHamstring stretch  30\" 3 4/4   Sidelying clamshells Sidelying SLR ABD        Recumbent bike   5'  5/4   LP   B   SL 80#  35# 2 10x  15x 5/4                                                             Therapeutic Activities (70764)       Pt educated to monitor duration of wb, mechanics, swelling etc 5/4 return into compression stocking Gait mechanics  FWB   5'  5/4                                             Neuromuscular Re-ed (19561)       Rocker Board PF/DF    -L only  Circular  cw/ccw Square    DF/PF 2 25x each 5/4Tandem stance  30\" 3x 5/4   8 cones fwd/ side to side/ rev- fwd sequence   2x ea 5/4   FSU 6\"  1 15x 5/4   Monitor knee pain                                                                                       Modalities:     Pt. Education:  -pt educated on diagnosis, prognosis and expectations for rehab  -all pt questions were answered    Home Exercise Program:     Access Code: [email or web address]  URL: [email or web address]  Date: 04/20/2022  Prepared by: Kendra Bhardwaj    Exercises  Seated Heel Raise - 1 x daily - 7 x weekly - 3 sets - 10 reps  Seated Heel Toe Raises - 1 x daily - 7 x weekly - 3 sets - 10 reps  Hooklying Isometric Clamshell - 1 x daily - 7 x weekly - 3 sets - 10 reps  Sidelying Hip Abduction - 1 x daily - 7 x weekly - 3 sets - 10 reps      Access Code: [email or web address]  URL: [email or web address]  Date: 03/28/2022  Prepared by: Sammi Galvin    Exercises  Supine Single Leg Ankle Pumps - 3 x daily - 7 x weekly - 20 reps  Supine Ankle Inversion Eversion AROM - 3 x daily - 7 x weekly - 20 reps  Long Sitting Calf Stretch with Strap - 3 x daily - 7 x weekly - 4 reps - 15 hold  Seated Toe Towel Scrunches - 3 x daily - 7 x weekly - 20 reps      Therapeutic Exercise and NMR EXR  [x] (72124) Provided verbal/tactile cueing for activities related to strengthening, flexibility, endurance, ROM for improvements in LE, proximal hip, and core control with self care, mobility, lifting, ambulation.  [] (35474) Provided verbal/tactile cueing for activities related to improving balance, coordination, kinesthetic sense, posture, motor skill, proprioception  to assist with LE, proximal hip, and core control in self care, mobility, lifting, ambulation and eccentric single leg control.   [] (08714) Therapist is in constant attendance of 2 or more patients providing skilled therapy interventions, but not providing any significant amount of measurable one-on-one time to either patient, for improvements in LE, proximal hip, and core control in self care, mobility, lifting, ambulation and eccentric single leg control.      NMR and Therapeutic Activities:    [x] (10700 or 78167) Provided verbal/tactile cueing for activities related to improving balance, coordination, kinesthetic sense, posture, motor skill, proprioception and motor activation to allow for proper function of core, proximal hip and LE with self care and ADLs  [] (75054) Gait Re-education- Provided training and instruction to the patient for proper LE, core and proximal hip recruitment and positioning and eccentric body weight control with ambulation re-education including up and down stairs     Home Exercise Program:    [] (11131) Reviewed/Progressed HEP activities related to strengthening, flexibility, endurance, ROM of core, proximal hip and LE for functional self-care, mobility, lifting and ambulation/stair navigation   [] (66905)Reviewed/Progressed HEP activities related to improving balance, coordination, kinesthetic sense, posture, motor skill, proprioception of core, proximal hip and LE for self care, mobility, lifting, and ambulation/stair navigation      Manual Treatments:  PROM / STM / Oscillations-Mobs:  G-I, II, III, IV (PA's, Inf., Post.)  [x] (11971) Provided manual therapy to mobilize LE, proximal hip and/or LS spine soft tissue/joints for the purpose of modulating pain, promoting relaxation,  increasing ROM, reducing/eliminating soft tissue swelling/inflammation/restriction, improving soft tissue extensibility and allowing for proper ROM for normal function with self care, mobility, lifting and ambulation. Modalities:  [] (80842) Vasopneumatic compression: Utilized vasopneumatic compression to decrease edema / swelling for the purpose of improving mobility and quad tone / recruitment which will allow for increased overall function including but not limited to self-care, transfers, ambulation, and ascending / descending stairs.        Charges:  Timed Code Treatment Minutes: 45   Total Treatment Minutes: 45     [] EVAL - LOW (92303)   [] EVAL - MOD (44088)  [] EVAL - HIGH (29220)  [] RE-EVAL (97694)  [] OH(61459) x 1      [] Ionto  [] NMR (16616) x          [] Vaso  [] Manual (32347) x  2    [] Ultrasound  [] TA x  1     [] Mech Traction (19805)  [] Aquatic Therapy x      [] ES (un) (73223):   [] Home Management Training x  [] ES(attended) (32921)   [] Dry Needling 1-2 muscles (76749):  [] Dry Needling 3+ muscles (342286  [] Group:      [] Other: GOALS:  Patient stated goal: return to stair climbing, walking and playing with kids. [] Progressing: [] Met: [] Not Met: [] Adjusted    Therapist goals for Patient:   Short Term Goals: To be achieved in: 2 weeks  1. Independent in HEP and progression per patient tolerance, in order to prevent re-injury. [] Progressing: [] Met: [] Not Met: [] Adjusted  2. Patient will have a decrease in pain to facilitate improvement in movement, function, and ADLs as indicated by Functional Deficits. [] Progressing: [] Met: [] Not Met: [] Adjusted    Long Term Goals: To be achieved in: 12 weeks  1. FOTO score of at least 60 to assist with reaching prior level of function. [] Progressing: [] Met: [] Not Met: [] Adjusted  2. Patient will demonstrate increased L ankle dorsiflexion AROM to 0 degrees  to allow for proper joint functioning and ability negotiate stairs safely. 4/27: Lacking 15 from neutral  [] Progressing: [] Met: [] Not Met: [] Adjusted  3. Patient will demonstrate an increase in Strength to at least 4+/5 as well as good proximal hip strength and control to allow for proper functional mobility and safe ascent/descent of stairs. 4/27: Unable to formally assess due to recent screw removal and pain   [] Progressing: [] Met: [] Not Met: [] Adjusted  4. Patient will return to functional activities including playing with her children without increased symptoms or restriction. 4/27: Pt still unable to WBAT without AD due to recent screw removal surgery   [] Progressing: [] Met: [] Not Met: [] Adjusted  5. Pt will be able to maintain appropriate balance in L LE SLS for 15 seconds with no use of UE support to demonstrate safety in navigating stairs and other functional activities.  4/27: Pt still unable to WBAT without AD due to recent screw removal surgery    [] Progressing: [] Met: [] Not Met: [] Adjusted         Overall Progression Towards Functional goals/ Treatment Progress Update:  [] Patient is progressing as expected towards functional goals listed. [] Progression is slowed due to complexities/Impairments listed. [] Progression has been slowed due to co-morbidities. [x] Plan just implemented, too soon to assess goals progression <30days   [] Goals require adjustment due to lack of progress  [] Patient is not progressing as expected and requires additional follow up with physician  [] Other    Persisting Functional Limitations/Impairments:  []Sitting [x]Standing   [x]Walking [x]Stairs   [x]Transfers [x]ADLs   [x]Squatting/bending []Kneeling  [x]Housework []Job related tasks  [x]Driving [x]Sports/Recreation   [x]Sleeping []Other:    ASSESSMENT: Pt tolerated interventions well today and demonstrates a positive response to manual interventions as demonstrated by improved joint mobility and muscle flexibility of the gastroc and soleus. Extensive manual time spent with the soleus as this muscle was very tight and was preventing CKC dorsiflexion during ambulation out of the boot. Pt demonstrates impaired gait mechanics with lack of CKC dorsiflexion which produces hip extension on R and lack of knee flexion on L as well as decreased heel strike and toe off. She also demonstrates decrease stance phase on the L with decreased stride length on the R with no use of crutches. Pt will continue to benefit from PT interventions in order to maximize strength, ROM, joint mobility and gait mechanics to improve her functional mobility and participation in ADLs.        Treatment/Activity Tolerance:  [x] Pt able to complete treatment [] Patient limited by fatique  [] Patient limited by pain  [] Patient limited by other medical complications  [] Other:     Prognosis:  [x] Good [] Fair  [] Poor    Patient Requires Follow-up: [x] Yes  [] No    Return to Play:    [x]  N/A   []  Stage 1: Intro to Strength   []  Stage 2: Return to Run and Strength   []  Stage 3: Return to Jump and Strength   []  Stage 4: Dynamic Strength and Agility   [] Stage 5: Sport Specific Training     []  Ready to Return to Play, Meets All Above Stages   []  Not Ready for Return to Sports   Comments:            PLAN: 1x week.   GAP SESSIONS    Electronically signed by: Prashanth Carver, [id number]

## 2022-05-11 ENCOUNTER — HOSPITAL ENCOUNTER (OUTPATIENT)
Dept: PHYSICAL THERAPY | Age: 32
Setting detail: THERAPIES SERIES
Discharge: HOME | End: 2022-05-11
Payer: COMMERCIAL

## 2022-05-11 PROCEDURE — 9990000027 HC GAP GROUP: Performed by: SPECIALIST/TECHNOLOGIST

## 2022-05-11 NOTE — FLOWSHEET NOTE
KellyOsbaldoArjun  Phone: [phone]   Fax: [phone]      Physical Therapy Treatment Note/ Progress Report:     Date:  2022    Patient Name:  Brayan Payton    :  1990  MRN: [id number]  Restrictions/Precautions:    Medical/Treatment Diagnosis Information:  Diagnosis: Z98.890, Z87.81 (ICD-10-CM) - S/P ORIF (open reduction internal fixation) [id number] (ICD-10-CM) - Closed trimalleolar fracture of left ankle with routine healing, subsequent encounter  Treatment Diagnosis: decreased ROM, strength, proprioception, balance impairments, reduced motor control, reduced participation in ADLs/IADLs, reduced tolerance for weight-bearing activities  Insurance/Certification information:  PT Insurance Information: [facility] [employer]--no auth required  Physician Information:  Referring Practitioner: Michelle Vila MD  Plan of care signed (Y/N): [x]  Yes []  No     Date of Patient follow up with Physician:      Progress Report: []  Yes  [x]  No     Date Range for reporting period:  Beginning: 3/28  Ending:     Progress report due (10 Rx/or 30 days whichever is less): visit #10 or  (date)     Recertification due (POC duration/ or 90 days whichever is less): visit #24 or  (date)                                             Visit # Gap session Auth required? Date Range   2 2 []  Yes  [x]  No        Latex Allergy:  [x]NO      []YES  Preferred Language for Healthcare:   [x]English       []other:    Functional Scale:           Date assessed:  TO physical FS primary measure score = 20; risk adjusted = 47  3/28    Pain level:  3/10  More stiffness than pain     SUBJECTIVE: Pt reports having increased soreness and swelling related to last session from new exercises and increased wb and walking. Is trying to maintain HEP and has some decreased mobility with DF but is trying to stretch off some books to stretch.  Went to [employer] but used to a wheelchair.   Will have insurance after June 1  OBJECTIVE:     4/27 PROM AROM     L R L R   Dorsiflexion     15 degrees from neutral 5   Plantarflexion      50 60   Inversion      20 35   Eversion      12 20     5/4 incision closed medially and laterally but needs daily STM w/ Vit E.   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  3 10x 5/11 Hamstring stretch  30\" 3 4/4   Sidelying clamshells Sidelying SLR ABD        Recumbent bike   5' 5/11   LP  ECC  SL   HR   B 80#  40#  40# 2 10x   5/11   Leg extension 90/30  30# 3 10x 5/ 11   wallsits   20\" 3x  5/11                                               Therapeutic Activities (92678)       Pt educated to monitor duration of wb, mechanics, swelling etc 5/4 return into compression stocking Gait mechanics  FWB   5' 5/11 not currently using a cane/ crutch                                             Neuromuscular Re-ed (44121)       Rocker Board PF/DF    -L only  Circular  cw/ccw Square  DF/PF inbv/EV  2 25x each 5/4Tandem stance  30\" 3x 5/4   8 cones fwd/ side to side/ rev- fwd sequence   2x ea 5/11   FSU 4\" up/ over   Side to side over step  1 15xea 5/11   Step to balance FWD/ SIDE step 3-5\"/ aerex 1 15x 5/11   Black wedge DF walking  NPV              sportscord walking yellow/ blue NPV                                                              Modalities:     Pt. Education:  -pt educated on diagnosis, prognosis and expectations for rehab  -all pt questions were answered    Home Exercise Program:     Access Code: [email or web address]  URL: [email or web address]  Date: 04/20/2022  Prepared by: Ying Mike    Exercises  Seated Heel Raise - 1 x daily - 7 x weekly - 3 sets - 10 reps  Seated Heel Toe Raises - 1 x daily - 7 x weekly - 3 sets - 10 reps  Hooklying Isometric Clamshell - 1 x daily - 7 x weekly - 3 sets - 10 reps  Sidelying Hip Abduction - 1 x daily - 7 x weekly - 3 sets - 10 reps      Access Code: [email or web address]  URL: [employer]  Date: 03/28/2022  Prepared by: Ying Mike    Exercises  Supine Single Leg Ankle Pumps - 3 x daily - 7 x weekly - 20 reps  Supine Ankle Inversion Eversion AROM - 3 x daily - 7 x weekly - 20 reps  Long Sitting Calf Stretch with Strap - 3 x daily - 7 x weekly - 4 reps - 15 hold  Seated Toe Towel Scrunches - 3 x daily - 7 x weekly - 20 reps      Therapeutic Exercise and NMR EXR  [x] (61625) Provided verbal/tactile cueing for activities related to strengthening, flexibility, endurance, ROM for improvements in LE, proximal hip, and core control with self care, mobility, lifting, ambulation.  [] (02726) Provided verbal/tactile cueing for activities related to improving balance, coordination, kinesthetic sense, posture, motor skill, proprioception  to assist with LE, proximal hip, and core control in self care, mobility, lifting, ambulation and eccentric single leg control.   [] (48206) Therapist is in constant attendance of 2 or more patients providing skilled therapy interventions, but not providing any significant amount of measurable one-on-one time to either patient, for improvements in LE, proximal hip, and core control in self care, mobility, lifting, ambulation and eccentric single leg control. NMR and Therapeutic Activities:    [x] (44113 or 29696) Provided verbal/tactile cueing for activities related to improving balance, coordination, kinesthetic sense, posture, motor skill, proprioception and motor activation to allow for proper function of core, proximal hip and LE with self care and ADLs  [] (64577) Gait Re-education- Provided training and instruction to the patient for proper LE, core and proximal hip recruitment and positioning and eccentric body weight control with ambulation re-education including up and down stairs     Home Exercise Program:    [] (90311) Reviewed/Progressed HEP activities related to strengthening, flexibility, endurance, ROM of core, proximal hip and LE for functional self-care, mobility, lifting and ambulation/stair navigation   [] (94922)Reviewed/Progressed HEP activities related to improving balance, coordination, kinesthetic sense, posture, motor skill, proprioception of core, proximal hip and LE for self care, mobility, lifting, and ambulation/stair navigation      Manual Treatments:  PROM / STM / Oscillations-Mobs:  G-I, II, III, IV (PA's, Inf., Post.)  [] (12252) Provided manual therapy to mobilize LE, proximal hip and/or LS spine soft tissue/joints for the purpose of modulating pain, promoting relaxation,  increasing ROM, reducing/eliminating soft tissue swelling/inflammation/restriction, improving soft tissue extensibility and allowing for proper ROM for normal function with self care, mobility, lifting and ambulation. Modalities:  [] (31137) Vasopneumatic compression: Utilized vasopneumatic compression to decrease edema / swelling for the purpose of improving mobility and quad tone / recruitment which will allow for increased overall function including but not limited to self-care, transfers, ambulation, and ascending / descending stairs.        Charges:  Timed Code Treatment Minutes: 45   Total Treatment Minutes: 45     [] EVAL - LOW (84038)   [] EVAL - MOD (72685)  [] EVAL - HIGH (67525)  [] RE-EVAL (68439)  [] EY(56830) x       [] Ionto  [] NMR (92370) x          [] Vaso  [] Manual (64914) x      [] Ultrasound  [] TA x       [] Mech Traction (80002)  [] Aquatic Therapy x      [] ES (un) (74346):   [] Home Management Training x  [] ES(attended) (93986)   [] Dry Needling 1-2 muscles (71934):  [] Dry Needling 3+ muscles (505587  [x] GAP Group: 5/11, 5/4     [] Other:      GOALS:  Patient stated goal: return to stair climbing, walking and playing with kids. [] Progressing: [] Met: [] Not Met: [] Adjusted    Therapist goals for Patient:   Short Term Goals: To be achieved in: 2 weeks  1. Independent in HEP and progression per patient tolerance, in order to prevent re-injury. [] Progressing: [] Met: [] Not Met: [] Adjusted  2. Patient will have a decrease in pain to facilitate improvement in movement, function, and ADLs as indicated by Functional Deficits. [] Progressing: [] Met: [] Not Met: [] Adjusted    Long Term Goals: To be achieved in: 12 weeks  1. FOTO score of at least 60 to assist with reaching prior level of function. [] Progressing: [] Met: [] Not Met: [] Adjusted  2. Patient will demonstrate increased L ankle dorsiflexion AROM to 0 degrees  to allow for proper joint functioning and ability negotiate stairs safely. [] Progressing: [] Met: [] Not Met: [] Adjusted  3. Patient will demonstrate an increase in Strength to at least 4+/5 as well as good proximal hip strength and control to allow for proper functional mobility and safe ascent/descent of stairs. [] Progressing: [] Met: [] Not Met: [] Adjusted  4. Patient will return to functional activities including playing with her children without increased symptoms or restriction. [] Progressing: [] Met: [] Not Met: [] Adjusted  5.  Pt will be able to maintain appropriate balance in L LE SLS for 15 seconds with no use of UE support to demonstrate safety in navigating stairs and other functional activities. 4/27: Pt still unable to WBAT without AD due to recent screw removal surgery    [] Progressing: [] Met: [] Not Met: [] Adjusted         Overall Progression Towards Functional goals/ Treatment Progress Update:  [] Patient is progressing as expected towards functional goals listed. [] Progression is slowed due to complexities/Impairments listed. [] Progression has been slowed due to co-morbidities. [x] Plan just implemented, too soon to assess goals progression <30days   [] Goals require adjustment due to lack of progress  [] Patient is not progressing as expected and requires additional follow up with physician  [] Other    Persisting Functional Limitations/Impairments:  []Sitting [x]Standing   [x]Walking [x]Stairs   [x]Transfers [x]ADLs   [x]Squatting/bending []Kneeling  [x]Housework []Job related tasks  [x]Driving [x]Sports/Recreation   [x]Sleeping []Other:    ASSESSMENT: Pt has improved gait mechanics today but is still lacking some DF with AROM. Pt has increased stiffness observed but has fair functional mobility with cone stepping and moving into fwd/ reverse planes. Pt challenged with step to balance and has left leg quadriceps deficits compared to the Rt leg which were address with LP and wallsits. Pt was sore after last session with progressions for multiple days. Determine pt tolerance NPV. Plan is 1x/week for [facility] sessions. Advised to continue to progress DF ROM w/ stair stretch 3x daily or use towel pull.       Treatment/Activity Tolerance:  [x] Pt able to complete treatment [] Patient limited by fatique  [] Patient limited by pain  [] Patient limited by other medical complications  [] Other:     Prognosis:  [x] Good [] Fair  [] Poor    Patient Requires Follow-up: [x] Yes  [] No    Return to Play:    [x]  N/A   []  Stage 1: Intro to Strength   []  Stage 2: Return to Run and Strength   []  Stage 3: Return to Jump and Strength   []  Stage 4: Dynamic Strength and Agility   []  Stage 5: Sport Specific Training     []  Ready to Return to Play, Meets All Above Stages   []  Not Ready for Return to Sports   Comments:            PLAN: 1x week. GAP SESSIONS.  Advised to begin STM to medial and lateral incisions since steristrips are removed and advised to use Vit E    Electronically signed by: Yan Cummings, [facility], [id number]

## 2022-05-19 ENCOUNTER — HOSPITAL ENCOUNTER (OUTPATIENT)
Dept: PHYSICAL THERAPY | Age: 32
Setting detail: THERAPIES SERIES
Discharge: HOME | End: 2022-05-19
Payer: COMMERCIAL

## 2022-05-19 NOTE — FLOWSHEET NOTE
Arjun Renee  Phone: [phone]   Fax: [phone]      Physical Therapy Treatment Note/ Progress Report:     Date:  2022    Patient Name:  Tereza Marcos    :  1990  MRN: [id number]  Restrictions/Precautions:    Medical/Treatment Diagnosis Information:  Diagnosis: Z98.890, Z87.81 (ICD-10-CM) - S/P ORIF (open reduction internal fixation) [id number] (ICD-10-CM) - Closed trimalleolar fracture of left ankle with routine healing, subsequent encounter  Treatment Diagnosis: decreased ROM, strength, proprioception, balance impairments, reduced motor control, reduced participation in ADLs/IADLs, reduced tolerance for weight-bearing activities  Insurance/Certification information:  PT Insurance Information: [facility] [employer]--no auth required  Physician Information:  Referring Practitioner: Rosaura Tubbs MD  Plan of care signed (Y/N): [x]  Yes []  No     Date of Patient follow up with Physician:      Progress Report: []  Yes  [x]  No     Date Range for reporting period:  Beginning: 3/28  Ending:     Progress report due (10 Rx/or 30 days whichever is less): visit #10 or 0/10 (date)     Recertification due (POC duration/ or 90 days whichever is less): visit #24 or  (date)                                             Visit # Gap session Auth required? Date Range   2 3 sv payment  []  Yes  [x]  No        Latex Allergy:  [x]NO      []YES  Preferred Language for Healthcare:   [x]English       []other:    Functional Scale:           Date assessed:  [facility] physical FS primary measure score = 20; risk adjusted = 47  3/28    Pain level:  3-4/10       SUBJECTIVE: Pt reports she was feeling good and was gardening and was trying to cut a root system out and stood to step on it with her uninvolved limb and she rolled her involved ankle and inverted the ankle to a roll and now has pain over the ATF.  She notes now new increasing in swelling but a little more sore and painful. She did arrive with a slight antalgic gait.      OBJECTIVE:     4/27 PROM AROM     L R L R   Dorsiflexion     15 degrees from neutral 5   Plantarflexion      50 60   Inversion      20 35   Eversion      12 20     5/4 incision closed medially and laterally but needs daily STM w/ Vit E.   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    Exercises/Interventions: 45'  Therapeutic Exercise (18464)  Resistance / level Sets/sec Reps Notes / Cues   Standing Gastroc/Soleus Stretch  30\" 3x    EOB PF/DF   blue 1 25x    EOB IV/EV blue 1 25x      HR/   standing No TR d/t pain over ATF  3 10x Hamstring stretch  30\" 3    Sidelying clamshells Sidelying SLR ABD        Recumbent bike   5'     LP  ECC  SL    80#  40#   2 10x      Leg extension 90/30  30# 3 10x    3x                                                Therapeutic Activities (10974)       5/4 return into compression stocking 5/11 not currently using a cane/ crutch   Stool scoots                                          Neuromuscular Re-ed (03242)       Rocker Board PF/DF    -L only  Circular  cw/ccw Square  DF/PF inbv/EV  2 30x each       FSU 4\" up/ over   Side to side over step  1 15xea    Step to balance FWD/ SIDE step 3-5\"/ aerex 1 15x    Black wedge DF walking  NPV              sportscord walking yellow/ blue lime x2 laps                                                             Modalities:     Pt. Education:  -pt educated on diagnosis, prognosis and expectations for rehab  -all pt questions were answered    Home Exercise Program:     Access Code: [email or web address]  URL: [email or web address]  Date: 04/20/2022  Prepared by: Xu Halim    Exercises  Seated Heel Raise - 1 x daily - 7 x weekly - 3 sets - 10 reps  Seated Heel Toe Raises - 1 x daily - 7 x weekly - 3 sets - 10 reps  Hooklying Isometric Clamshell - 1 x daily - 7 x weekly - 3 sets - 10 reps  Sidelying Hip Abduction - 1 x daily - 7 x weekly - 3 sets - 10 reps      Access Code: [email or web address]  URL: [email or web address]  Date: 03/28/2022  Prepared by: Rajivstine Halim    Exercises  Supine Single Leg Ankle Pumps - 3 x daily - 7 x weekly - 20 reps  Supine Ankle Inversion Eversion AROM - 3 x daily - 7 x weekly - 20 reps  Long Sitting Calf Stretch with Strap - 3 x daily - 7 x weekly - 4 reps - 15 hold  Seated Toe Towel Scrunches - 3 x daily - 7 x weekly - 20 reps          Charges:  Timed Code Treatment Minutes: Total Treatment Minutes:      [] EVAL - LOW (79224)   [] EVAL - MOD (16072)  [] EVAL - HIGH (34468)  [] RE-EVAL (49971)  [] UL(69948) x       [] Ionto  [] NMR (85684) x          [] Vaso  [] Manual (37517) x      [] Ultrasound  [] TA x       [] Mech Traction (85122)  [] Aquatic Therapy x      [] ES (un) (19906):   [] Home Management Training x  [] ES(attended) (06222)   [] Dry Needling 1-2 muscles (15241):  [] Dry Needling 3+ muscles (971770  [x] GAP Group: 5/19 5/11, 5/4     [] Other:      GOALS:  Patient stated goal: return to stair climbing, walking and playing with kids. [] Progressing: [] Met: [] Not Met: [] Adjusted    Therapist goals for Patient:   Short Term Goals: To be achieved in: 2 weeks  1. Independent in HEP and progression per patient tolerance, in order to prevent re-injury. [] Progressing: [] Met: [] Not Met: [] Adjusted  2. Patient will have a decrease in pain to facilitate improvement in movement, function, and ADLs as indicated by Functional Deficits.   [] today. She had no issues with new lateral walks or increase in resistance for ankle PREs. She noted the increase in resistance felt better and gave her ankle more tension to work off of. Treatment/Activity Tolerance:  [x] Pt able to complete treatment [] Patient limited by fatique  [] Patient limited by pain  [] Patient limited by other medical complications  [] Other:     Prognosis:  [x] Good [] Fair  [] Poor    Patient Requires Follow-up: [x] Yes  [] No    Return to Play:    [x]  N/A   []  Stage 1: Intro to Strength   []  Stage 2: Return to Run and Strength   []  Stage 3: Return to Jump and Strength   []  Stage 4: Dynamic Strength and Agility   []  Stage 5: Sport Specific Training     []  Ready to Return to Play, Meets All Above Stages   []  Not Ready for Return to Sports   Comments:            PLAN: 1x week. GAP SESSIONS.  Advised to begin STM to medial and lateral incisions since steristrips are removed and advised to use Vit E    Electronically signed by: Aria Reddy ATC

## 2022-05-26 ENCOUNTER — HOSPITAL ENCOUNTER (OUTPATIENT)
Dept: PHYSICAL THERAPY | Age: 32
Setting detail: THERAPIES SERIES
Discharge: HOME | End: 2022-05-26
Payer: COMMERCIAL

## 2022-05-26 PROCEDURE — 9990000027 HC GAP GROUP

## 2022-05-26 NOTE — FLOWSHEET NOTE
KellyOsbaldoArjun  Phone: [phone]   Fax: [phone]      Physical Therapy Treatment Note/ Progress Report:     Date:  2022    Patient Name:  Pamela Benitez    :  1990  MRN: [id number]  Restrictions/Precautions:    Medical/Treatment Diagnosis Information:  Diagnosis: Z98.890, Z87.81 (ICD-10-CM) - S/P ORIF (open reduction internal fixation) [id number] (ICD-10-CM) - Closed trimalleolar fracture of left ankle with routine healing, subsequent encounter  Treatment Diagnosis: decreased ROM, strength, proprioception, balance impairments, reduced motor control, reduced participation in ADLs/IADLs, reduced tolerance for weight-bearing activities  Insurance/Certification information:  PT Insurance Information: [facility] [employer]--no auth required  Physician Information:  Referring Practitioner: Titus Orozco MD  Plan of care signed (Y/N): [x]  Yes []  No     Date of Patient follow up with Physician:      Progress Report: []  Yes  [x]  No     Date Range for reporting period:  Beginning: 3/28  Ending:     Progress report due (10 Rx/or 30 days whichever is less): visit #10 or  (date)     Recertification due (POC duration/ or 90 days whichever is less): visit #24 or  (date)                                             Visit # Gap session Auth required? Date Range   2 4 sv payment  []  Yes  [x]  No        Latex Allergy:  [x]NO      []YES  Preferred Language for Healthcare:   [x]English       []other:    Functional Scale:           Date assessed:  [facility] physical FS primary measure score = 20; risk adjusted = 47  3/28    Pain level:  10 @ start of treatment;  7/10 first thing in the AM     SUBJECTIVE: Pt reports  She is having increased shooting pains that last long periods of time. She also notes that the ATF and top of her ankle that has been bothering her more since she rolled it last week.  She notes that she attempts to perform her IHEP but there are items she doesn't have at home to recreate her skills. She does notice that the lateral scar still has some scabbing and those spots are where she has the most discomfort.      OBJECTIVE:     4/27 PROM AROM     L R L R   Dorsiflexion     15 degrees from neutral 5   Plantarflexion      50 60   Inversion      20 35   Eversion      12 20     5/4 incision closed medially and laterally but needs daily STM w/ Vit E.   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    Exercises/Interventions: 45'  Therapeutic Exercise (20353)  Resistance / level Sets/sec Reps Notes / Cues   Standing Gastroc/Soleus Stretch  30\" 3x    EOB PF/DF   blue 1 25x    EOB IV/EV blue 1 25x      HR  standing No TR d/t pain over ATF  3 10x Hamstring stretch  30\" 3    Sidelying clamshells Sidelying SLR ABD        Recumbent bike   5'           3x                                                Therapeutic Activities (04444)       5/4 return into compression stocking 5/11 not currently using a cane/ crutch   Stool scoots x2 laps                                         Neuromuscular Re-ed (40985)       Rocker Board PF/DF    -L only  Circular  cw/ccw Square  DF/PF inv/EV  2 30x each       FSU 4\" up/ over   Side to side over step  1 20xea    Step to balance FWD/ SIDE step 3-5\"/ aerex 1 15x    Black wedge DF walking                   SLS 20sec 2-3                                                      Modalities:     Pt. Education:  -pt educated on diagnosis, prognosis and expectations for rehab  -all pt questions were answered    Home Exercise Program:     Access Code: [email or web address]  URL: [email or web address]/  Date: 04/20/2022  Prepared by: Kylahmargarito Lipa    Exercises  Seated Heel Raise - 1 x daily - 7 x weekly - 3 sets - 10 reps  Seated Heel Toe Raises - 1 x daily - 7 x weekly - 3 sets - 10 reps  Hooklying Isometric Clamshell - 1 x daily - 7 x weekly - 3 sets - 10 reps  Sidelying Hip Abduction - 1 x daily - 7 x weekly - 3 sets - 10 reps      Access Code: [email or web address]  URL: [employer]/  Date: 03/28/2022  Prepared by: Flormargarito Lipa    Exercises  Supine Single Leg Ankle Pumps - 3 x daily - 7 x weekly - 20 reps  Supine Ankle Inversion Eversion AROM - 3 x daily - 7 x weekly - 20 reps  Long Sitting Calf Stretch with Strap - 3 x daily - 7 x weekly - 4 reps - 15 hold  Seated Toe Towel Scrunches - 3 x daily - 7 x weekly - 20 reps          Charges:  Timed Code Treatment Minutes: Total Treatment Minutes:      [] EVAL - LOW (68127)   [] EVAL - MOD (44923)  [] EVAL - HIGH (12886)  [] RE-EVAL (55653)  [] DJ(71506) x       [] Ionto  [] NMR (62883) x          [] Vaso  [] Manual (00249) x      [] Ultrasound  [] TA x       [] Mech Traction (39982)  [] Aquatic Therapy x      [] ES (un) (13174):   [] Home Management Training x  [] ES(attended) (88347)   [] Dry Needling 1-2 muscles (52798):  [] Dry Needling 3+ muscles (554329  [x] GAP Group: 5/19 5/11, 5/4     [] Other:      GOALS:  Patient stated goal: return to stair climbing, walking and playing with kids. [] Progressing: [] Met: [] Not Met: [] Adjusted    Therapist goals for Patient:   Short Term Goals: To be achieved in: 2 weeks  1. Independent in HEP and progression per patient tolerance, in order to prevent re-injury. [] Progressing: [] Met: [] Not Met: [] Adjusted  2.  Patient will have a decrease in pain to facilitate improvement in movement, function, and ADLs as indicated by Functional Deficits. [] Progressing: [] Met: [] Not Met: [] Adjusted    Long Term Goals: To be achieved in: 12 weeks  1. FOTO score of at least 60 to assist with reaching prior level of function. [] Progressing: [] Met: [] Not Met: [] Adjusted  2. Patient will demonstrate increased L ankle dorsiflexion AROM to 0 degrees  to allow for proper joint functioning and ability negotiate stairs safely. [] Progressing: [] Met: [] Not Met: [] Adjusted  3. Patient will demonstrate an increase in Strength to at least 4+/5 as well as good proximal hip strength and control to allow for proper functional mobility and safe ascent/descent of stairs. [] Progressing: [] Met: [] Not Met: [] Adjusted  4. Patient will return to functional activities including playing with her children without increased symptoms or restriction. [] Progressing: [] Met: [] Not Met: [] Adjusted  5. Pt will be able to maintain appropriate balance in L LE SLS for 15 seconds with no use of UE support to demonstrate safety in navigating stairs and other functional activities. 4/27: Pt still unable to WBAT without AD due to recent screw removal surgery    [] Progressing: [] Met: [] Not Met: [] Adjusted         Overall Progression Towards Functional goals/ Treatment Progress Update:  [] Patient is progressing as expected towards functional goals listed. [] Progression is slowed due to complexities/Impairments listed. [] Progression has been slowed due to co-morbidities.   [x] Plan just implemented, too soon to assess goals progression <30days   [] Goals require adjustment due to lack of progress  [] Patient is not progressing as expected and requires additional follow up with physician  [] Other    Persisting Functional Limitations/Impairments:  []Sitting [x]Standing   [x]Walking [x]Stairs   [x]Transfers [x]ADLs   [x]Squatting/bending []Kneeling  [x]Housework []Job related tasks  [x]Driving [x]Sports/Recreation   [x]Sleeping []Other:    ASSESSMENT: Pt doing well with

## 2022-06-01 ENCOUNTER — HOSPITAL ENCOUNTER (OUTPATIENT)
Dept: PHYSICAL THERAPY | Age: 32
Setting detail: THERAPIES SERIES
Discharge: HOME | End: 2022-06-01
Payer: COMMERCIAL

## 2022-06-01 PROCEDURE — 97112 NEUROMUSCULAR REEDUCATION: CPT

## 2022-06-01 PROCEDURE — 97110 THERAPEUTIC EXERCISES: CPT

## 2022-06-01 PROCEDURE — 97530 THERAPEUTIC ACTIVITIES: CPT

## 2022-06-01 NOTE — FLOWSHEET NOTE
Arjun Mendoza  Phone: [phone]   Fax: [phone]      Physical Therapy Treatment Note/ Progress Report:     Date:  2022    Patient Name:  Jean Obrien    :  1990  MRN: [id number]  Restrictions/Precautions:    Medical/Treatment Diagnosis Information:  Diagnosis: Z98.890, Z87.81 (ICD-10-CM) - S/P ORIF (open reduction internal fixation) [id number] (ICD-10-CM) - Closed trimalleolar fracture of left ankle with routine healing, subsequent encounter  Treatment Diagnosis: decreased ROM, strength, proprioception, balance impairments, reduced motor control, reduced participation in ADLs/IADLs, reduced tolerance for weight-bearing activities  Insurance/Certification information:  PT Insurance Information: [address] [place]  Physician Information:  Referring Practitioner: Denis Collins MD  Plan of care signed (Y/N): [x]  Yes []  No     Date of Patient follow up with Physician:      Progress Report: []  Yes  [x]  No     Date Range for reporting period:  Beginning: 3/28  Ending:     Progress report due (10 Rx/or 30 days whichever is less): visit #10 or  (date)     Recertification due (POC duration/ or 90 days whichever is less): visit #24 or  (date)                                             Visit # Gap session Auth required? Date Range   1(new insurance starting )    2    8 visits   (old insurance) 3/28-         4 sv payment      []  Yes  []  No  Need to verify benefits for new insurance starting         Latex Allergy:  [x]NO      []YES  Preferred Language for Healthcare:   [x]English       []other:    Functional Scale:           Date assessed:  FOTO physical FS primary measure score = 20; risk adjusted = 47  3/28    Pain level:  5/10 @ start of treatment;  7/10 first thing in the AM     SUBJECTIVE:  Shooting pains are slightly improved with respect to frequency and intensity.       OBJECTIVE:      PROM AROM     L R L R   Dorsiflexion     15 degrees from neutral 5   Plantarflexion      50 60   Inversion      20 35   Eversion      12 20     5/4 incision closed medially and laterally but needs daily STM w/ Vit E.   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    Exercises/Interventions:   Therapeutic Exercise (01360)  Resistance / level Sets/sec Reps Notes / Cues   Standing Gastroc/Soleus Stretch  30\" 3x    EOB PF/DF   blue 1 25x    EOB IV/EV blue 1 25x      HR  standing No TR d/t pain over ATF  3 10x Hamstring stretch  30\" 3    Sidelying clamshells Sidelying SLR ABD        Recumbent bike   5'           3x                                                Therapeutic Activities (09140)       5/4 return into compression stocking 5/11 not currently using a cane/ crutch   Stool scoots x2 laps      Squats on bosu (blue up)  1 20 Added 6/1   Retro/ lateral slider lunges  2 10 Added 6/1                        Neuromuscular Re-ed (85459)       Rocker Board PF/DF    -L only  Circular  cw/ccw Square  DF/PF inv/EV  2 30x each       FSU 4\" up/ over            6\"     Side to side over step  6\"  1  1    1 20xea  5 reps    20 reps    Step to balance FWD/ SIDE step 3-5\"/ airex 1 15x    Black wedge DF walking                   SLS 20sec 3                                                      Modalities:     Pt. Education:  -pt educated on diagnosis, prognosis and expectations for rehab  -all pt questions were answered    Home Exercise Program:     Access Code: [email or web address]  URL: [email or web address]  Date: 04/20/2022  Prepared by: Doloris [place]    Exercises  Seated Heel Raise - 1 x daily - 7 x weekly - 3 sets - 10 reps  Seated Heel Toe Raises - 1 x daily - 7 x weekly - 3 sets - 10 reps  Hooklying Isometric Clamshell - 1 x daily - 7 x weekly - 3 sets - 10 reps  Sidelying Hip Abduction - 1 x daily - 7 x weekly - 3 sets - 10 reps      Access Code: [email or web address]  URL: [email or web address]/  Date: 03/28/2022  Prepared by: Doloris [place]    Exercises  Supine Single Leg Ankle Pumps - 3 x daily - 7 x weekly - 20 reps  Supine Ankle Inversion Eversion AROM - 3 x daily - 7 x weekly - 20 reps  Long Sitting Calf Stretch with Strap - 3 x daily - 7 x weekly - 4 reps - 15 hold  Seated Toe Towel Scrunches - 3 x daily - 7 x weekly - 20 reps          Charges:  Timed Code Treatment Minutes: 45   Total Treatment Minutes: 45     [] EVAL - LOW (98341)   [] EVAL - MOD (04934)  [] EVAL - HIGH (05161)  [] RE-EVAL (17625)  [x] BY(76560) x  1     [] Ionto  [x] NMR (58121) x 1         [] Vaso  [] Manual (76694) x       [] Ultrasound  [x] TA x  1     [] Mech Traction (29438)  [] Aquatic Therapy x      [] ES (un) (28262):   [] Home Management Training x  [] ES(attended) (40331)   [] Dry Needling 1-2 muscles (07135):  [] Dry Needling 3+ muscles (306530  [] GAP Group: 5/19 5/11, 5/4   [] Other:      GOALS:  Patient stated goal: return to stair climbing, walking and playing with kids. [] Progressing: [] Met: [] Not Met: [] Adjusted    Therapist goals for Patient:   Short Term Goals: To be achieved in: 2 weeks  1. Independent in HEP and progression per patient tolerance, in order to prevent re-injury. [] Progressing: [] Met: [] Not Met: [] Adjusted  2. Patient will have a decrease in pain to facilitate improvement in movement, function, and ADLs as indicated by Functional Deficits. [] Progressing: [] Met: [] Not Met: [] Adjusted    Long Term Goals:  To be achieved in: 12 weeks  1. FOTO score of at least 60 to assist with reaching prior level of function. [] Progressing: [] Met: [] Not Met: [] Adjusted  2. Patient will demonstrate increased L ankle dorsiflexion AROM to 0 degrees  to allow for proper joint functioning and ability negotiate stairs safely. [] Progressing: [] Met: [] Not Met: [] Adjusted  3. Patient will demonstrate an increase in Strength to at least 4+/5 as well as good proximal hip strength and control to allow for proper functional mobility and safe ascent/descent of stairs. [] Progressing: [] Met: [] Not Met: [] Adjusted  4. Patient will return to functional activities including playing with her children without increased symptoms or restriction. [] Progressing: [] Met: [] Not Met: [] Adjusted  5. Pt will be able to maintain appropriate balance in L LE SLS for 15 seconds with no use of UE support to demonstrate safety in navigating stairs and other functional activities. 4/27: Pt still unable to WBAT without AD due to recent screw removal surgery    [] Progressing: [] Met: [] Not Met: [] Adjusted         Overall Progression Towards Functional goals/ Treatment Progress Update:  [] Patient is progressing as expected towards functional goals listed. [] Progression is slowed due to complexities/Impairments listed. [] Progression has been slowed due to co-morbidities. [x] Plan just implemented, too soon to assess goals progression <30days   [] Goals require adjustment due to lack of progress  [] Patient is not progressing as expected and requires additional follow up with physician  [] Other    Persisting Functional Limitations/Impairments:  []Sitting [x]Standing   [x]Walking [x]Stairs   [x]Transfers [x]ADLs   [x]Squatting/bending []Kneeling  [x]Housework []Job related tasks  [x]Driving [x]Sports/Recreation   [x]Sleeping []Other:    ASSESSMENT:   Pt continues to demonstrate anterior ankle joint tightness with dorsiflexion especially with descent down stairs. Upgrades performed this date as noted in bold on above flow sheet to improve ankle ROM and stability to tolerate prolonged weight bearing tasks as well as functional tasks. Pt challenge by upgrades this date with ankle stiffness and tightness noted but no report of increased pain. Pt will continue to benefit from manual therapy for ankle mobilizations to improve joint capsule mobility function to achieve improved ankle mobility for stairs, squats, etc as well as skilled therapy to improve proprioception and ankle stability to decrease further risk of ankle injury. Treatment/Activity Tolerance:  [x] Pt able to complete treatment [] Patient limited by fatique  [] Patient limited by pain  [] Patient limited by other medical complications  [] Other:     Prognosis:  [x] Good [] Fair  [] Poor    Patient Requires Follow-up: [x] Yes  [] No    Return to Play:    [x]  N/A   []  Stage 1: Intro to Strength   []  Stage 2: Return to Run and Strength   []  Stage 3: Return to Jump and Strength   []  Stage 4: Dynamic Strength and Agility   []  Stage 5: Sport Specific Training   []  Ready to Return to Play, Meets All Above Stages   []  Not Ready for Return to Sports   Comments:            PLAN: 1x week. GAP SESSIONS.  Advised to begin STM to medial and lateral incisions since steristrips are removed and advised to use Vit E    Electronically signed by: Betito Ugalde  PTA [id number]

## 2022-06-07 ENCOUNTER — HOSPITAL ENCOUNTER (OUTPATIENT)
Dept: PHYSICAL THERAPY | Age: 32
Setting detail: THERAPIES SERIES
Discharge: HOME | End: 2022-06-07
Payer: COMMERCIAL

## 2022-06-07 PROCEDURE — 97110 THERAPEUTIC EXERCISES: CPT | Performed by: SPECIALIST/TECHNOLOGIST

## 2022-06-07 PROCEDURE — 97112 NEUROMUSCULAR REEDUCATION: CPT | Performed by: SPECIALIST/TECHNOLOGIST

## 2022-06-07 PROCEDURE — 97140 MANUAL THERAPY 1/> REGIONS: CPT | Performed by: SPECIALIST/TECHNOLOGIST

## 2022-06-07 NOTE — FLOWSHEET NOTE
Kelly Ajrun  Phone: [phone]   Fax: [phone]      Physical Therapy Treatment Note/ Progress Report:     Date:  2022    Patient Name:  Petty Hernandez    :  1990  MRN: [id number]  Restrictions/Precautions:    Medical/Treatment Diagnosis Information:  Diagnosis: Z98.890, Z87.81 (ICD-10-CM) - S/P ORIF (open reduction internal fixation) [id number] (ICD-10-CM) - Closed trimalleolar fracture of left ankle with routine healing, subsequent encounter  Treatment Diagnosis: decreased ROM, strength, proprioception, balance impairments, reduced motor control, reduced participation in ADLs/IADLs, reduced tolerance for weight-bearing activities  Insurance/Certification information:  PT Insurance Information: [address] [place] Salvadormas  Physician Information:  Referring Practitioner: Soha Olmos MD  Plan of care signed (Y/N): [x]  Yes []  No     Date of Patient follow up with Physician:  Nakia Flores      Progress Report: []  Yes  [x]  No     Date Range for reporting period:  Beginning: 3/28  Ending:     Progress report due (10 Rx/or 30 days whichever is less): visit #10 or  (date)     Recertification due (POC duration/ or 90 days whichever is less): visit #24 or  (date)                                             Visit # Gap session Auth required? Date Range                        []  Yes  []  No  [facility]  new insurance starting         Latex Allergy:  [x]NO      []YES  Preferred Language for Healthcare:   [x]English       []other:    Functional Scale:           Date assessed:  FOTO physical FS primary measure score = 20; risk adjusted = 47  3/28  FOTO physical FS primary measure score = 53                                                       Pain level:  3 /10  Pain is worse with progression of day, shooting pains but can be often.      SUBJECTIVE:  Pt reports having some shooting pains across the medial ankle that she will have even at rest. Pt feels the since ankle sprain around 5/19. Pt has shooting pains with resting or even in bed at times. Is working on her scar daily to assist mobility. OBJECTIVE:     4/27 PROM AROM     L R L R   Dorsiflexion     15 degrees from neutral 5   Plantarflexion      50 60   Inversion      20 35   Eversion      12 20     5/4 incision closed medially and laterally but needs daily STM w/ Vit E.   MMT:                     LT                         RT   DF                        22.5                      29.3  PF                         23.3                      24     INV                         16.1                    18.9  EV                         14.5                      25.4               mild antalgic gait with decreased DF mobility present       6/7 Pt has mild tenderness over TC joint but is better than it had been. Hypomobility with TC joint. Has some increased tenderness over ATF with active DF.     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    Exercises/Interventions: 28'  Therapeutic Exercise (42378)  Resistance / level Sets/sec Reps Notes / Cues   Standing Gastroc/Soleus Stretch  30\" 3x    EOB PF/DF   blue 1 25x    EOB IV/EV blue 1 25x      HR  standing  3 10x 6/7Hamstring stretch  30\" 3    Sidelying clamshells Sidelying SLR ABD        Recumbent bike   5'     LP  ECCSL85#45#  40#2 10x   6/7      3x    SOLEUS press seated  30# 2 10x 6/7 Therapeutic Activities (23234)       Updated FOTO6/7    Stool scoots x2 laps      Squats on bosu (blue up)  1 20 Added 6/1   Retro/ lateral slider lunges  2 10 Added 6/1   Fitter board isometrics  5# 1min  6/7                 Neuromuscular Re-ed (00434)       Rocker Board PF/DF    -L only  Circular  cw/ccw Square  DF/PF   2 30x each 6/7      FSU 4\" up/ over            6\"     Side to side over step  6\"  1  1    1 20xea  5 reps    20 reps    Step to balance FWD/ SIDE step 3-5\"/  bosu 1 15x 6/7   Black wedge DF walking                   SLS 20sec 3 6/7                            Manual stretching w/ mobilizations   focus on TC-  DF ROM goals 10'    6/7  EOB grade 2 mobilizations                     Modalities:     Pt. Education:  -pt educated on diagnosis, prognosis and expectations for rehab  -all pt questions were answered    Home Exercise Program:     Access Code: [email or web address]  URL: [email or web address]  Date: 04/20/2022  Prepared by: Mabelene Hash    Exercises  Seated Heel Raise - 1 x daily - 7 x weekly - 3 sets - 10 reps  Seated Heel Toe Raises - 1 x daily - 7 x weekly - 3 sets - 10 reps  Hooklying Isometric Clamshell - 1 x daily - 7 x weekly - 3 sets - 10 reps  Sidelying Hip Abduction - 1 x daily - 7 x weekly - 3 sets - 10 reps      Access Code: [email or web address]  URL: [email or web address]  Date: 03/28/2022  Prepared by: Mabelene Hash    Exercises  Supine Single Leg Ankle Pumps - 3 x daily - 7 x weekly - 20 reps  Supine Ankle Inversion Eversion AROM - 3 x daily - 7 x weekly - 20 reps  Long Sitting Calf Stretch with Strap - 3 x daily - 7 x weekly - 4 reps - 15 hold  Seated Toe Towel Scrunches - 3 x daily - 7 x weekly - 20 reps          Charges:  Timed Code Treatment Minutes: 45   Total Treatment Minutes: 45     [] EVAL - LOW (33297)   [] EVAL - MOD (61381)  [] EVAL - HIGH (90290)  [] RE-EVAL (43848)  [x] KX(98331) x  1     [] Ionto  [x] NMR (48520) x 1         [] Vaso  [] Manual (40192) x [] Ultrasound  [x] TA x  1     [] [facility] (65147)  [] Aquatic Therapy x      [] ES (un) (02868):   [] Home Management Training x  [] ES(attended) (04564)   [] Dry Needling 1-2 muscles (64731):  [] Dry Needling 3+ muscles (074531  [] GAP Group: 5/19 5/11, 5/4   [] Other:      GOALS:  Patient stated goal: return to stair climbing, walking and playing with kids. [] Progressing: [] Met: [] Not Met: [] Adjusted    Therapist goals for Patient:   Short Term Goals: To be achieved in: 2 weeks  1. Independent in HEP and progression per patient tolerance, in order to prevent re-injury. [] Progressing: [] Met: [] Not Met: [] Adjusted  2. Patient will have a decrease in pain to facilitate improvement in movement, function, and ADLs as indicated by Functional Deficits. [] Progressing: [] Met: [] Not Met: [] Adjusted    Long Term Goals: To be achieved in: 12 weeks  1. FOTO score of at least 60 to assist with reaching prior level of function. [] Progressing: [] Met: [] Not Met: [] Adjusted  2. Patient will demonstrate increased L ankle dorsiflexion AROM to 0 degrees  to allow for proper joint functioning and ability negotiate stairs safely. [] Progressing: [] Met: [] Not Met: [] Adjusted  3. Patient will demonstrate an increase in Strength to at least 4+/5 as well as good proximal hip strength and control to allow for proper functional mobility and safe ascent/descent of stairs. [] Progressing: [] Met: [] Not Met: [] Adjusted  4. Patient will return to functional activities including playing with her children without increased symptoms or restriction. [] Progressing: [] Met: [] Not Met: [] Adjusted  5. Pt will be able to maintain appropriate balance in L LE SLS for 15 seconds with no use of UE support to demonstrate safety in navigating stairs and other functional activities.  4/27: Pt still unable to WBAT without AD due to recent screw removal surgery    [] Progressing: [] Met: [] Not Met: [] Adjusted         Overall Progression Towards Functional goals/ Treatment Progress Update:  [] Patient is progressing as expected towards functional goals listed. [] Progression is slowed due to complexities/Impairments listed. [] Progression has been slowed due to co-morbidities. [x] Plan just implemented, too soon to assess goals progression <30days   [] Goals require adjustment due to lack of progress  [] Patient is not progressing as expected and requires additional follow up with physician  [] Other    Persisting Functional Limitations/Impairments:  []Sitting [x]Standing   [x]Walking [x]Stairs   [x]Transfers [x]ADLs   [x]Squatting/bending []Kneeling  [x]Housework []Job related tasks  [x]Driving [x]Sports/Recreation   [x]Sleeping []Other:    ASSESSMENT:  Pt program implemented today after interruption in PT care related to insurance change. Pt had reassessment performed today to reassess new baseline for left ankle. Pt reports having moments with shooting pains with ankle at rest and increased discomfort with active DF along the anterior shin. Pt continues to have increased stiffness with TC joint and admitted some relief after manual therapy today. Pt will continue to benefit from manual therapy for ankle mobilizations to improve joint capsule mobility function to achieve improved ankle mobility for stairs, squats, etc as well as skilled therapy to improve proprioception and ankle stability to decrease further risk of ankle injury.      Treatment/Activity Tolerance:  [x] Pt able to complete treatment [] Patient limited by fatique  [] Patient limited by pain  [] Patient limited by other medical complications  [] Other:     Prognosis:  [x] Good [] Fair  [] Poor    Patient Requires Follow-up: [x] Yes  [] No    Return to Play:    [x]  N/A   []  Stage 1: Intro to Strength   []  Stage 2: Return to Run and Strength   []  Stage 3: Return to Jump and Strength   []  Stage 4: Dynamic Strength and Agility   [] Stage 5: Sport Specific Training   []  Ready to Return to Play, Meets All Above Stages   []  Not Ready for Return to Sports   Comments:            PLAN: Kiki Dent on 6/9  Electronically signed by: Rafiq Vigil [place],   [id number]

## 2022-06-09 ENCOUNTER — OFFICE VISIT (OUTPATIENT)
Dept: ORTHOPEDIC SURGERY | Age: 32
End: 2022-06-09

## 2022-06-09 ENCOUNTER — HOSPITAL ENCOUNTER (OUTPATIENT)
Dept: PHYSICAL THERAPY | Age: 32
Setting detail: THERAPIES SERIES
Discharge: HOME | End: 2022-06-09
Payer: COMMERCIAL

## 2022-06-09 VITALS — BODY MASS INDEX: 28.49 KG/M2 | WEIGHT: 188 LBS | HEIGHT: 68 IN

## 2022-06-09 DIAGNOSIS — M25.572 LEFT ANKLE PAIN, UNSPECIFIED CHRONICITY: ICD-10-CM

## 2022-06-09 DIAGNOSIS — Z98.890 S/P ORIF (OPEN REDUCTION INTERNAL FIXATION) FRACTURE: Primary | ICD-10-CM

## 2022-06-09 DIAGNOSIS — Z87.81 S/P ORIF (OPEN REDUCTION INTERNAL FIXATION) FRACTURE: Primary | ICD-10-CM

## 2022-06-09 PROCEDURE — 97112 NEUROMUSCULAR REEDUCATION: CPT

## 2022-06-09 PROCEDURE — 97140 MANUAL THERAPY 1/> REGIONS: CPT

## 2022-06-09 PROCEDURE — 99024 POSTOP FOLLOW-UP VISIT: CPT | Performed by: ORTHOPAEDIC SURGERY

## 2022-06-09 PROCEDURE — 97110 THERAPEUTIC EXERCISES: CPT

## 2022-06-09 NOTE — PROGRESS NOTES
Jean Obrien  [id number]  June 9, 2022    Chief Complaint   Patient presents with    Post-Op Check     REMOVAL SYNDESMOTIC SCREW, REMOVAL MEDIAL SCREW LEFT ANKLE; DOS 4/25/22. History: The patient is a 59-year-old female who is here for evaluation of her left ankle. She is now approximately 6 weeks status post hardware removal from the left ankle. She reports mild to moderate pain. She is out of the boot. He is now approximately 4 months post original injury date. He does perform home exercises and started an outpatient therapy program.      The patient's  past medical history, medications, allergies,  family history, social history, and have been reviewed, and dated and are recorded in the chart. Pertinent items are noted in HPI. Review of systems reviewed from Pertinent History Form dated on 3/23/22 and available in the patient's chart under the Media tab. Vitals:  Ht 5' 8\" (1.727 m)   Wt 188 lb (85.3 kg)   BMI 28.59 kg/m²     Physical: On examination today, the patient is in no apparent distress. She is alert and oriented. She has mild swelling of the left ankle. There is no evidence of DVT. She is neurovascularly intact distally. Her incisions are well-healed. There is no evidence of drainage. We dorsiflex the left ankle to 25 degrees. She plantar flexes down to 30 degrees. X-rays: 3 views of the left ankle obtained in the office today were extensively reviewed. The ankle mortise is well reduced. The fibula fracture is essentially healed. The medial malleolus fracture is essentially healed   The medial malleolus fragment was very small. There is surgical absence of the medial screw. There is surgical absence of the syndesmotic screw as well. Impression: Status post open reduction and internal fixation of left ankle    Plan: The patient will continue to work on range of motion and strengthening. She will continue the formal therapy program for 3 to 4 weeks.   She will gradually resume regular activities for the next 1 to 2 months. She was instructed to take ibuprofen 600 mg twice daily for a week.

## 2022-06-09 NOTE — FLOWSHEET NOTE
[place] , [facility]  Phone: [phone]   Fax: [phone]      Physical Therapy Treatment Note/ Progress Report:     Date:  2022    Patient Name:  Samanta Najera    :  1990  MRN: [id number]  Restrictions/Precautions:    Medical/Treatment Diagnosis Information:  Diagnosis: Z98.890, Z87.81 (ICD-10-CM) - S/P ORIF (open reduction internal fixation) [id number] (ICD-10-CM) - Closed trimalleolar fracture of left ankle with routine healing, subsequent encounter  Treatment Diagnosis: decreased ROM, strength, proprioception, balance impairments, reduced motor control, reduced participation in ADLs/IADLs, reduced tolerance for weight-bearing activities  Insurance/Certification information:  PT Insurance Information: [address] [place]  Physician Information:  Referring Practitioner: Froylan Latham MD  Plan of care signed (Y/N): [x]  Yes []  No     Date of Patient follow up with Physician:  Rinku Grijalva      Progress Report: []  Yes  [x]  No     Date Range for reporting period:  Beginning: 3/28  Ending:     Progress report due (10 Rx/or 30 days whichever is less): visit #10 or  (date)     Recertification due (POC duration/ or 90 days whichever is less): visit #24 or  (date)                                             Visit # Gap session Auth required? Date Range   3/25                     []  Yes  []  No  [facility]  new insurance starting         Latex Allergy:  [x]NO      []YES  Preferred Language for Healthcare:   [x]English       []other:    Functional Scale:           Date assessed:  FOTO physical FS primary measure score = 20; risk adjusted = 47  3/28  FOTO physical FS primary measure score = 53                                                       Pain level:  3 /10      SUBJECTIVE: Pt reports she got a good report from MD. Pain is mild today, shooting pain getting better.      OBJECTIVE:      PROM AROM     L R L R   Dorsiflexion     15 degrees from neutral 5   Plantarflexion      50 60   Inversion      20 35   Eversion      12 20     5/4 incision closed medially and laterally but needs daily STM w/ Vit E.   MMT:                     LT                         RT   DF                        22.5                      29.3  PF                         23.3                      24     INV                         16.1                    18.9  EV                         14.5                      25.4               mild antalgic gait with decreased DF mobility present       6/7 Pt has mild tenderness over TC joint but is better than it had been. Hypomobility with TC joint. Has some increased tenderness over ATF with active DF.     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    Exercises/Interventions: 35'  Therapeutic Exercise (02666)  Resistance / level Sets/sec Reps Notes / Cues   Standing Gastroc/Soleus Stretch  30\" 3x    EOB PF/DF   blue 1 25x    EOB IV/EV blue 1 25x      HR  standing  3 10x 6/7Hamstring stretch  30\" 3    Sidelying clamshells Sidelying SLR ABD        Recumbent bike   5'     LP  ECC  SL   HR   B 90#45#  40#2  2  2 10x  10  10   6/7   Leg extension 90/30     wallsits  3x    SOLEUS press seated  35# 3 10x 6/7                                        Therapeutic Activities (41776)       Updated FOTO6/7    Stool scoots x2 laps      Squats on bosu (blue up)  1 20 Added 6/1   Retro/ lateral slider lunges  2 10 Added 6/1   Fitter board isometrics  5# 1min  6/7                 Neuromuscular Re-ed (33569)       Rocker Board PF/DF    -L only  Circular  cw/ccw Square  DF/PF   2 30x each 6/7      FSU 4\" up/ over            6\"     Side to side over step  6\"  1  1    1 20xea  5 reps    20 reps    Step to balance FWD/ SIDE step 3-5\"/  bosu 1 15x 6/7   Black wedge DF walking                   SLS 20sec 3 6/7   biodex balance   LOS L11  MC     1'  1'   X2  x2    bosu lunge   1 10    Lateral band walks ( laces)   20' 4           Manual stretching w/ mobilizations   focus on TC-  DF ROM goals 10'      EOB grade 2 mobilizations                     Modalities:     Pt. Education:  -pt educated on diagnosis, prognosis and expectations for rehab  -all pt questions were answered    Home Exercise Program:     Access Code: [email or web address]  URL: [email or web address]  Date: 04/20/2022  Prepared by: Fredrica Leticia    Exercises  Seated Heel Raise - 1 x daily - 7 x weekly - 3 sets - 10 reps  Seated Heel Toe Raises - 1 x daily - 7 x weekly - 3 sets - 10 reps  Hooklying Isometric Clamshell - 1 x daily - 7 x weekly - 3 sets - 10 reps  Sidelying Hip Abduction - 1 x daily - 7 x weekly - 3 sets - 10 reps      Access Code: [email or web address]  URL: [employer]/  Date: 03/28/2022  Prepared by: Fredrica Leticia    Exercises  Supine Single Leg Ankle Pumps - 3 x daily - 7 x weekly - 20 reps  Supine Ankle Inversion Eversion AROM - 3 x daily - 7 x weekly - 20 reps  Long Sitting Calf Stretch with Strap - 3 x daily - 7 x weekly - 4 reps - 15 hold  Seated Toe Towel Scrunches - 3 x daily - 7 x weekly - 20 reps          Charges:  Timed Code Treatment Minutes: 46   Total Treatment Minutes: 46     [] EVAL - LOW (73669)   [] EVAL - MOD (93332)  [] EVAL - HIGH (66535)  [] RE-EVAL (35768)  [x] FU(88112) x  1     [] Ionto  [x] NMR (73217) x 1         [] Vaso  [x] Manual (90625) x   1    [] Ultrasound  [] TA x  1     [] Mech Traction (25932)  [] Aquatic Therapy x [] ES (un) (17443):   [] Home Management Training x  [] ES(attended) (87528)   [] Dry Needling 1-2 muscles (98622):  [] Dry Needling 3+ muscles (814881  [] GAP Group: 5/19 5/11, 5/4   [] Other:      GOALS:  Patient stated goal: return to stair climbing, walking and playing with kids. [] Progressing: [] Met: [] Not Met: [] Adjusted    Therapist goals for Patient:   Short Term Goals: To be achieved in: 2 weeks  1. Independent in HEP and progression per patient tolerance, in order to prevent re-injury. [] Progressing: [] Met: [] Not Met: [] Adjusted  2. Patient will have a decrease in pain to facilitate improvement in movement, function, and ADLs as indicated by Functional Deficits. [] Progressing: [] Met: [] Not Met: [] Adjusted    Long Term Goals: To be achieved in: 12 weeks  1. FOTO score of at least 60 to assist with reaching prior level of function. [] Progressing: [] Met: [] Not Met: [] Adjusted  2. Patient will demonstrate increased L ankle dorsiflexion AROM to 0 degrees  to allow for proper joint functioning and ability negotiate stairs safely. [] Progressing: [] Met: [] Not Met: [] Adjusted  3. Patient will demonstrate an increase in Strength to at least 4+/5 as well as good proximal hip strength and control to allow for proper functional mobility and safe ascent/descent of stairs. [] Progressing: [] Met: [] Not Met: [] Adjusted  4. Patient will return to functional activities including playing with her children without increased symptoms or restriction. [] Progressing: [] Met: [] Not Met: [] Adjusted  5. Pt will be able to maintain appropriate balance in L LE SLS for 15 seconds with no use of UE support to demonstrate safety in navigating stairs and other functional activities.  4/27: Pt still unable to WBAT without AD due to recent screw removal surgery    [] Progressing: [] Met: [] Not Met: [] Adjusted         Overall Progression Towards Functional goals/ Treatment Progress Update:  [] Patient is progressing as expected towards functional goals listed. [] Progression is slowed due to complexities/Impairments listed. [] Progression has been slowed due to co-morbidities. [x] Plan just implemented, too soon to assess goals progression <30days   [] Goals require adjustment due to lack of progress  [] Patient is not progressing as expected and requires additional follow up with physician  [] Other    Persisting Functional Limitations/Impairments:  []Sitting [x]Standing   [x]Walking [x]Stairs   [x]Transfers [x]ADLs   [x]Squatting/bending []Kneeling  [x]Housework []Job related tasks  [x]Driving [x]Sports/Recreation   [x]Sleeping []Other:    ASSESSMENT:  Pt with improved tolerance to todays session with less pain. Continue PT over the next few weeks to improve deficits. Pt will continue to benefit from manual therapy for ankle mobilizations to improve joint capsule mobility function to achieve improved ankle mobility for stairs, squats, etc as well as skilled therapy to improve proprioception and ankle stability to decrease further risk of ankle injury.      Treatment/Activity Tolerance:  [x] Pt able to complete treatment [] Patient limited by fatique  [] Patient limited by pain  [] Patient limited by other medical complications  [] Other:     Prognosis:  [x] Good [] Fair  [] Poor    Patient Requires Follow-up: [x] Yes  [] No    Return to Play:    [x]  N/A   []  Stage 1: Intro to Strength   []  Stage 2: Return to Run and Strength   []  Stage 3: Return to Jump and Strength   []  Stage 4: Dynamic Strength and Agility   []  Stage 5: Sport Specific Training   []  Ready to Return to Play, Meets All Above Stages   []  Not Ready for Return to Sports   Comments:            PLAN:   Electronically signed by: Robbie Carlson,   [id number]

## 2022-06-17 ENCOUNTER — HOSPITAL ENCOUNTER (OUTPATIENT)
Dept: PHYSICAL THERAPY | Age: 32
Setting detail: THERAPIES SERIES
Discharge: HOME | End: 2022-06-17
Payer: COMMERCIAL

## 2022-06-17 PROCEDURE — 97112 NEUROMUSCULAR REEDUCATION: CPT

## 2022-06-17 PROCEDURE — 97110 THERAPEUTIC EXERCISES: CPT

## 2022-06-17 PROCEDURE — 97140 MANUAL THERAPY 1/> REGIONS: CPT

## 2022-06-17 NOTE — FLOWSHEET NOTE
Arjun Mendoza  Phone: [phone]   Fax: [phone]      Physical Therapy Treatment Note/ Progress Report:     Date:  2022    Patient Name:  Jazmyn Elliott    :  1990  MRN: [id number]  Restrictions/Precautions:    Medical/Treatment Diagnosis Information:  Diagnosis: Z98.890, Z87.81 (ICD-10-CM) - S/P ORIF (open reduction internal fixation) [id number] (ICD-10-CM) - Closed trimalleolar fracture of left ankle with routine healing, subsequent encounter  Treatment Diagnosis: decreased ROM, strength, proprioception, balance impairments, reduced motor control, reduced participation in ADLs/IADLs, reduced tolerance for weight-bearing activities  Insurance/Certification information:  PT Insurance Information: [address] Padmini Candelaria  Physician Information:  Referring Practitioner: Annalisa Basilio MD  Plan of care signed (Y/N): [x]  Yes []  No     Date of Patient follow up with Physician:  Kvng Pereira      Progress Report: []  Yes  [x]  No     Date Range for reporting period:  Beginning: 3/28  Ending:     Progress report due (10 Rx/or 30 days whichever is less): visit #10 or  (date)     Recertification due (POC duration/ or 90 days whichever is less): visit #24 or  (date)                                             Visit # Gap session Auth required? Date Range                        []  Yes  []  No  [facility]  new insurance starting         Latex Allergy:  [x]NO      []YES  Preferred Language for Healthcare:   [x]English       []other:    Functional Scale:           Date assessed:  FOTO physical FS primary measure score = 20; risk adjusted = 47  3/28  FOTO physical FS primary measure score = 53                                                       Pain level:  3 /10      SUBJECTIVE: Pt reports she continues to do well, overall pain is mild but still there.  Pt states stairs are probably her biggest issue with going down stairs due to lack of ROM in the ankle. Strength is increasing but still different and weaker than R.     OBJECTIVE:     4/27 PROM AROM     L R L R   Dorsiflexion     15 degrees from neutral 5   Plantarflexion      50 60   Inversion      20 35   Eversion      12 20     5/4 incision closed medially and laterally but needs daily STM w/ Vit E.   MMT:                     LT                         RT   DF                        22.5                      29.3  PF                         23.3                      24     INV                         16.1                    18.9  EV                         14.5                      25.4               mild antalgic gait with decreased DF mobility present       6/7 Pt has mild tenderness over TC joint but is better than it had been. Hypomobility with TC joint. Has some increased tenderness over ATF with active DF.     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    Exercises/Interventions: 35'  Therapeutic Exercise (97135)  Resistance / level Sets/sec Reps Notes / Cues   Standing Gastroc/Soleus Stretch with incline board  30\" 3x    EOB PF/DF     EOB IV/EV      HR  standing  3 10x 6/7Hamstring stretch  30\" 3    Sidelying clamshells Sidelying SLR ABD        Recumbent bike   5'     LP  ECC  SL   HR   B 90#45#  40#2  2  2 10x  10  10   6/7   Leg extension 90/30     wallsits  3x    SOLEUS press seated  35# 3 10x 6/7   Lunge stretch on chair for ankle DF  20\" holds 3 6/17                                 Therapeutic Activities (20119)       Updated FOTO6/7    Stool scoots x2 laps      Squats on bosu (blue up)  1 20 Added 6/1   Retro/ lateral slider lunges  2 10 Added 6/1   Fitter board isometrics  5# 1min  6/7   TRX squats to 90  2 10 6/17          Neuromuscular Re-ed (90661)       Rocker Board PF/DF    -L only  Circular  cw/ccw Square  DF/PF   2 30x each 6/7      FSU 4\" up/ over            6\"     Side to side over step  6\"  1  1    1 20xea  5 reps    20 reps    Step to balance FWD/ SIDE step 3-5\"/  bosu 1 15x 6/7   Black wedge DF walking                   SLS 20sec 3 6/7   biodex balance   LOS L11  MC     1'  1'   X2  x2    bosu lunge   1 10    Lateral band walks ( laces)   20' 4    Lateral step out and hold with sport cord with 3 point toe touch on R 1 yellow loop 1 10 6/17   Wobble board with R toes on biodex, L only on board:   -DF/PF  -Inv/Ever  -CW/CCW L1 small board 1 10 6/17                               Manual stretching w/ mobilizations   focus on TC-  DF ROM goals 10'      EOB grade 2-3 mobilizations                     Modalities:     Pt. Education:  -pt educated on diagnosis, prognosis and expectations for rehab  -all pt questions were answered    Home Exercise Program:     Access Code: [email or web address]  URL: [email or web address]  Date: 04/20/2022  Prepared by: Berneda Blind    Exercises  Seated Heel Raise - 1 x daily - 7 x weekly - 3 sets - 10 reps  Seated Heel Toe Raises - 1 x daily - 7 x weekly - 3 sets - 10 reps  Hooklying Isometric Clamshell - 1 x daily - 7 x weekly - 3 sets - 10 reps  Sidelying Hip Abduction - 1 x daily - 7 x weekly - 3 sets - 10 reps      Access Code: [email or web address]  URL: [email or web address]  Date: 03/28/2022  Prepared by: Berneda Blind    Exercises  Supine Single Leg Ankle Pumps - 3 x daily - 7 x weekly - 20 reps  Supine Ankle Inversion Eversion AROM - 3 x daily - 7 x weekly - 20 reps  Long Sitting Calf Stretch with Strap - 3 x daily - 7 x weekly - 4 reps - 15 hold  Seated Toe Towel Scrunches - 3 x daily - 7 x weekly - 20 reps          Charges:  Timed Code Treatment Minutes: 46   Total Treatment Minutes: 46     [] EVAL - LOW (22517)   [] EVAL - MOD (58971)  [] EVAL - HIGH (56299)  [] RE-EVAL (06082)  [x] WS(95882) x  1     [] Ionto  [x] NMR (47817) x 1         [] Vaso  [x] Manual (16634) x   1    [] Ultrasound  [] TA x  1     [] Mech Traction (01741)  [] Aquatic Therapy x      [] ES (un) (24144):   [] Home Management Training x  [] ES(attended) (15129)   [] Dry Needling 1-2 muscles (07038):  [] Dry Needling 3+ muscles (210238  [] GAP Group: 5/19 5/11, 5/4   [] Other:      GOALS:  Patient stated goal: return to stair climbing, walking and playing with kids. [] Progressing: [] Met: [] Not Met: [] Adjusted    Therapist goals for Patient:   Short Term Goals: To be achieved in: 2 weeks  1. Independent in HEP and progression per patient tolerance, in order to prevent re-injury. [] Progressing: [] Met: [] Not Met: [] Adjusted  2. Patient will have a decrease in pain to facilitate improvement in movement, function, and ADLs as indicated by Functional Deficits. [] Progressing: [] Met: [] Not Met: [] Adjusted    Long Term Goals: To be achieved in: 12 weeks  1. FOTO score of at least 60 to assist with reaching prior level of function. [] Progressing: [] Met: [] Not Met: [] Adjusted  2. Patient will demonstrate increased L ankle dorsiflexion AROM to 0 degrees  to allow for proper joint functioning and ability negotiate stairs safely. [] Progressing: [] Met: [] Not Met: [] Adjusted  3. Patient will demonstrate an increase in Strength to at least 4+/5 as well as good proximal hip strength and control to allow for proper functional mobility and safe ascent/descent of stairs. [] Progressing: [] Met: [] Not Met: [] Adjusted  4. Patient will return to functional activities including playing with her children without increased symptoms or restriction.  [] Progressing: [] Met: [] Not Met: [] Adjusted  5. Pt will be able to maintain appropriate balance in L LE SLS for 15 seconds with no use of UE support to demonstrate safety in navigating stairs and other functional activities. 4/27: Pt still unable to WBAT without AD due to recent screw removal surgery    [] Progressing: [] Met: [] Not Met: [] Adjusted         Overall Progression Towards Functional goals/ Treatment Progress Update:  [] Patient is progressing as expected towards functional goals listed. [] Progression is slowed due to complexities/Impairments listed. [] Progression has been slowed due to co-morbidities. [x] Plan just implemented, too soon to assess goals progression <30days   [] Goals require adjustment due to lack of progress  [] Patient is not progressing as expected and requires additional follow up with physician  [] Other    Persisting Functional Limitations/Impairments:  []Sitting [x]Standing   [x]Walking [x]Stairs   [x]Transfers [x]ADLs   [x]Squatting/bending []Kneeling  [x]Housework []Job related tasks  [x]Driving [x]Sports/Recreation   [x]Sleeping []Other:    ASSESSMENT:  Progressions with interventions as noted per log with increased emphasis on strength and control. Pt did tolerate these progressions well, pain was minimal throughout session and improved mobility noted with manual interventions. Will f/u and progress strength and mobility as able.      Treatment/Activity Tolerance:  [x] Pt able to complete treatment [] Patient limited by fatique  [] Patient limited by pain  [] Patient limited by other medical complications  [] Other:     Prognosis:  [x] Good [] Fair  [] Poor    Patient Requires Follow-up: [x] Yes  [] No    Return to Play:    [x]  N/A   []  Stage 1: Intro to Strength   []  Stage 2: Return to Run and Strength   []  Stage 3: Return to Jump and Strength   []  Stage 4: Dynamic Strength and Agility   []  Stage 5: Sport Specific Training   []  Ready to Return to Play, [employer] All Above Stages   []  Not Ready for Return to Sports   Comments:            PLAN: 2x per week for 6 weeks  Electronically signed by: Hugo Dejesus, PT  DPT, OMT-C

## 2022-06-20 ENCOUNTER — HOSPITAL ENCOUNTER (OUTPATIENT)
Dept: PHYSICAL THERAPY | Age: 32
Setting detail: THERAPIES SERIES
Discharge: HOME | End: 2022-06-20
Payer: COMMERCIAL

## 2022-06-21 DIAGNOSIS — S91.339A PUNCTURE WOUND OF FOOT, UNSPECIFIED LATERALITY, INITIAL ENCOUNTER: Primary | ICD-10-CM

## 2022-06-21 RX ORDER — CEPHALEXIN 500 MG/1
500 CAPSULE ORAL 3 TIMES DAILY
Qty: 30 CAPSULE | Refills: 0 | Status: SHIPPED | OUTPATIENT
Start: 2022-06-21 | End: 2022-07-01

## 2022-06-23 ENCOUNTER — APPOINTMENT (OUTPATIENT)
Dept: PHYSICAL THERAPY | Age: 32
End: 2022-06-23
Payer: COMMERCIAL

## 2022-06-30 ENCOUNTER — HOSPITAL ENCOUNTER (OUTPATIENT)
Dept: PHYSICAL THERAPY | Age: 32
Setting detail: THERAPIES SERIES
Discharge: HOME | End: 2022-06-30
Payer: COMMERCIAL

## 2022-06-30 PROCEDURE — 97112 NEUROMUSCULAR REEDUCATION: CPT

## 2022-06-30 PROCEDURE — 97140 MANUAL THERAPY 1/> REGIONS: CPT

## 2022-06-30 PROCEDURE — 97110 THERAPEUTIC EXERCISES: CPT

## 2022-06-30 NOTE — FLOWSHEET NOTE
Kelly Arjun  Phone: [phone]   Fax: [phone]      Physical Therapy Treatment Note/ Progress Report:     Date:  2022    Patient Name:  Rafael Moran    :  1990  MRN: [id number]  Restrictions/Precautions:    Medical/Treatment Diagnosis Information:  Diagnosis: Z98.890, Z87.81 (ICD-10-CM) - S/P ORIF (open reduction internal fixation) [id number] (ICD-10-CM) - Closed trimalleolar fracture of left ankle with routine healing, subsequent encounter  Treatment Diagnosis: decreased ROM, strength, proprioception, balance impairments, reduced motor control, reduced participation in ADLs/IADLs, reduced tolerance for weight-bearing activities  Insurance/Certification information:  PT Insurance Information: [address] [employer]i [place]  Physician Information:  Referring Practitioner: Pedro Matson MD  Plan of care signed (Y/N): [x]  Yes []  No     Date of Patient follow up with Physician:  Jas Valera      Progress Report: []  Yes  [x]  No     Date Range for reporting period:  Beginning: 3/28  Ending:     Progress report due (10 Rx/or 30 days whichever is less): visit #10 or  (date)     Recertification due (POC duration/ or 90 days whichever is less): visit #24 or  (date)                                             Visit # Gap session Auth required? Date Range                        []  Yes  []  No  [facility]  new insurance starting         Latex Allergy:  [x]NO      []YES  Preferred Language for Healthcare:   [x]English       []other:    Functional Scale:           Date assessed:  FOTO physical FS primary measure score = 20; risk adjusted = 47  3/28  FOTO physical FS primary measure score = 53                                                       Pain level:  0 /10      SUBJECTIVE: Pt reports she is doing better today, after last visit she took a family trip to [place] and went on back to back days.  After the second day she was in a lot of pain and had a lot of issues for about 2 days and then gradually got better. Pt reports stairs are becoming easier but still going down     OBJECTIVE:     4/27 PROM AROM     L R L R   Dorsiflexion     15 degrees from neutral 5   Plantarflexion      50 60   Inversion      20 35   Eversion      12 20     5/4 incision closed medially and laterally but needs daily STM w/ Vit E.   MMT:                     LT                         RT   DF                        22.5                      29.3  PF                         23.3                      24     INV                         16.1                    18.9  EV                         14.5                      25.4               mild antalgic gait with decreased DF mobility present       6/7 Pt has mild tenderness over TC joint but is better than it had been. Hypomobility with TC joint. Has some increased tenderness over ATF with active DF.     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    Exercises/Interventions: 35'  Therapeutic Exercise (46948)  Resistance / level Sets/sec Reps Notes / Cues   Standing Gastroc/Soleus Stretch with incline board  30\" 3x    EOB PF/DF     EOB IV/EV      HR  standing  3 10x 6/7Hamstring stretch  30\" 3    Sidelying clamshells Sidelying SLR ABD        Recumbent bike   5'     LP  ECC  SL   HR   B 100#45#  100#2    3 10x    10   6/7   Leg extension 90/30     wallsits  3x    SOLEUS press seated  40# 3 10x 6/7                                 Therapeutic Activities (03349)       Updated FOTO6/7    Stool scoots x2 laps      Squats on bosu (blue up)  1 20 Added 6/1   Retro/ lateral slider lunges  2 10 Added 6/1   Fitter board isometrics  5# 1min  6/7          Lateral heel taps 4 inch 1 10 L 6/30   Forward step up and over and return 4 inch 1 10 6/30 cues needed to try and keep heel down                        Neuromuscular Re-ed (74936)       Rocker Board PF/DF    -L only  Circular  cw/ccw Square  DF/PF   2 30x each 6/7      FSU 4\" up/ over            6\"     Side to side over step  6\" Step to balance FWD/ SIDE step Black wedge DF walking                   SLS 20sec 3 6/7   biodex balance   LOS L11  MC      bosu lunge     Lateral band walks ( laces)     Lateral step out and hold with sport cord with 3 point toe touch on R 6/17   Wobble board with R toes on biodex, L only on board:   -DF/PF  -Inv/Ever  -CW/CCW L1 small board 1 10 6/17                               Manual stretching w/ mobilizations   focus on TC-  DF ROM goals 5'      EOB grade 2-3 mobilizations   Prone IASTM L gastroc/soleus   8 min 6/30              Modalities:     Pt. Education:  -pt educated on diagnosis, prognosis and expectations for rehab  -all pt questions were answered    Home Exercise Program:     Access Code: [email or web address]  URL: [email or web address]  Date: 04/20/2022  Prepared by: Iraida Bauer    Exercises  Seated Heel Raise - 1 x daily - 7 x weekly - 3 sets - 10 reps  Seated Heel Toe Raises - 1 x daily - 7 x weekly - 3 sets - 10 reps  Hooklying Isometric Clamshell - 1 x daily - 7 x weekly - 3 sets - 10 reps  Sidelying Hip Abduction - 1 x daily - 7 x weekly - 3 sets - 10 reps      Access Code: [email or web address]  URL: [employer]/  Date: 03/28/2022  Prepared by: Iraida Bauer    Exercises  Supine Single Leg Ankle Pumps - 3 x daily - 7 x weekly - 20 reps  Supine Ankle Inversion Eversion AROM - 3 x daily - 7 x weekly - 20 reps  Long Sitting Calf Stretch with Strap - 3 x daily - 7 x weekly - 4 reps - 15 hold  Seated Toe Towel Scrunches - 3 x daily - 7 x weekly - 20 reps      Therapeutic Exercise and NMR EXR  [x] (40281) Provided verbal/tactile cueing for activities related to strengthening, flexibility, endurance, ROM for improvements in LE, proximal hip, and core control with self care, mobility, lifting, ambulation. [x] (77516) Provided verbal/tactile cueing for activities related to improving balance, coordination, kinesthetic sense, posture, motor skill, proprioception  to assist with LE, proximal hip, and core control in self care, mobility, lifting, ambulation and eccentric single leg control.   [] (73217) Therapist is in constant attendance of 2 or more patients providing skilled therapy interventions, but not providing any significant amount of measurable one-on-one time to either patient, for improvements in LE, proximal hip, and core control in self care, mobility, lifting, ambulation and eccentric single leg control.      NMR and Therapeutic Activities:    [x] (11649 or 45717) Provided verbal/tactile cueing for activities related to improving balance, coordination, kinesthetic sense, posture, motor skill, proprioception and motor activation to allow for proper function of core, proximal hip and LE with self care and ADLs  [] (60244) Gait Re-education- Provided training and instruction to the patient for proper LE, core and proximal hip recruitment and positioning and eccentric body weight control with ambulation re-education including up and down stairs     Home Exercise Program:    [] (91722) Reviewed/Progressed HEP activities related to strengthening, flexibility, endurance, ROM of core, proximal hip and LE for functional self-care, mobility, lifting and ambulation/stair navigation   [] (10743)Reviewed/Progressed HEP activities related to improving balance, coordination, kinesthetic sense, posture, motor skill, proprioception of core, proximal hip and LE for self care, mobility, lifting, and ambulation/stair navigation      Manual Treatments:  PROM / STM / Oscillations-Mobs:  G-I, II, III, IV (PA's, Inf., Post.)  [x] (60165) Provided manual therapy to mobilize LE, proximal hip and/or LS spine soft tissue/joints for the purpose of modulating pain, promoting relaxation,  increasing ROM, reducing/eliminating soft tissue swelling/inflammation/restriction, improving soft tissue extensibility and allowing for proper ROM for normal function with self care, mobility, lifting and ambulation. Modalities:  [] (38756) Vasopneumatic compression: Utilized vasopneumatic compression to decrease edema / swelling for the purpose of improving mobility and quad tone / recruitment which will allow for increased overall function including but not limited to self-care, transfers, ambulation, and ascending / descending stairs. Charges:  Timed Code Treatment Minutes: 44   Total Treatment Minutes: 44     [] EVAL - LOW (34102)   [] EVAL - MOD (64074)  [] EVAL - HIGH (69058)  [] RE-EVAL (49653)  [x] BX(98201) x  1     [] Ionto  [x] NMR (38143) x 1         [] Vaso  [x] Manual (74664) x   1    [] Ultrasound  [] TA x  1     [] Mech Traction (15894)  [] Aquatic Therapy x      [] ES (un) (44044):   [] Home Management Training x  [] ES(attended) (96691)   [] Dry Needling 1-2 muscles (69696):  [] Dry Needling 3+ muscles (694078  [] GAP Group: 5/19 5/11, 5/4   [] Other:      GOALS:  Patient stated goal: return to stair climbing, walking and playing with kids. [] Progressing: [] Met: [] Not Met: [] Adjusted    Therapist goals for Patient:   Short Term Goals: To be achieved in: 2 weeks  1. Independent in HEP and progression per patient tolerance, in order to prevent re-injury. [] Progressing: [] Met: [] Not Met: [] Adjusted  2. Patient will have a decrease in pain to facilitate improvement in movement, function, and ADLs as indicated by Functional Deficits.   [] Progressing: [] Met: [] Not Met: [] Adjusted    Long Term Goals: To be achieved in: 12 weeks  1. FOTO score of at least 60 to assist with reaching prior level of function. [] Progressing: [] Met: [] Not Met: [] Adjusted  2. Patient will demonstrate increased L ankle dorsiflexion AROM to 0 degrees  to allow for proper joint functioning and ability negotiate stairs safely. [] Progressing: [] Met: [] Not Met: [] Adjusted  3. Patient will demonstrate an increase in Strength to at least 4+/5 as well as good proximal hip strength and control to allow for proper functional mobility and safe ascent/descent of stairs. [] Progressing: [] Met: [] Not Met: [] Adjusted  4. Patient will return to functional activities including playing with her children without increased symptoms or restriction. [] Progressing: [] Met: [] Not Met: [] Adjusted  5. Pt will be able to maintain appropriate balance in L LE SLS for 15 seconds with no use of UE support to demonstrate safety in navigating stairs and other functional activities. 4/27: Pt still unable to WBAT without AD due to recent screw removal surgery    [] Progressing: [] Met: [] Not Met: [] Adjusted         Overall Progression Towards Functional goals/ Treatment Progress Update:  [] Patient is progressing as expected towards functional goals listed. [] Progression is slowed due to complexities/Impairments listed. [] Progression has been slowed due to co-morbidities.   [x] Plan just implemented, too soon to assess goals progression <30days   [] Goals require adjustment due to lack of progress  [] Patient is not progressing as expected and requires additional follow up with physician  [] Other    Persisting Functional Limitations/Impairments:  []Sitting [x]Standing   [x]Walking [x]Stairs   [x]Transfers [x]ADLs   [x]Squatting/bending []Kneeling  [x]Housework []Job related tasks  [x]Driving [x]Sports/Recreation   [x]Sleeping []Other:    ASSESSMENT:  Advanced manual interventions as noted today to work on improvement with joint mobility and muscular flexibility which pt did tolerate well with expressed tenderness throughout musculature addressed with IASTM. Joint mobility progressing however posterior talur mobility continues to be restricted and carried over with lack of CKC DF. Strength and control interventions tolerated really well although pt did have most difficulty with stair activity due to lack of eccentric DF and lack of CKC DF.      Treatment/Activity Tolerance:  [x] Pt able to complete treatment [] Patient limited by fatique  [] Patient limited by pain  [] Patient limited by other medical complications  [] Other:     Prognosis:  [x] Good [] Fair  [] Poor    Patient Requires Follow-up: [x] Yes  [] No    Return to Play:    [x]  N/A   []  Stage 1: Intro to Strength   []  Stage 2: Return to Run and Strength   []  Stage 3: Return to Jump and Strength   []  Stage 4: Dynamic Strength and Agility   []  Stage 5: Sport Specific Training   []  Ready to Return to Play, Meets All Above Stages   []  Not Ready for Return to Sports   Comments:            PLAN: 2x per week for 6 weeks  Electronically signed by: Rachael Echeverria, PT  DPT, OCS, OMT-C

## 2022-07-06 ENCOUNTER — HOSPITAL ENCOUNTER (OUTPATIENT)
Dept: PHYSICAL THERAPY | Age: 32
Setting detail: THERAPIES SERIES
End: 2022-07-06
Payer: COMMERCIAL

## 2022-07-08 ENCOUNTER — HOSPITAL ENCOUNTER (OUTPATIENT)
Dept: PHYSICAL THERAPY | Age: 32
Setting detail: THERAPIES SERIES
Discharge: HOME | End: 2022-07-08
Payer: COMMERCIAL

## 2022-07-08 PROCEDURE — 97110 THERAPEUTIC EXERCISES: CPT

## 2022-07-08 PROCEDURE — 97530 THERAPEUTIC ACTIVITIES: CPT

## 2022-07-08 NOTE — PLAN OF CARE
[place], [address]  Phone: [phone]   Fax: [phone]    Physical Therapy Re-Certification Plan of Care    Dear Verena Bernal MD  ,    We had the pleasure of treating the following patient for physical therapy services at [facility]. A summary of our findings can be found in the updated assessment below. This includes our plan of care. If you have any questions or concerns regarding these findings, please do not hesitate to contact me at the office phone number checked above.   Thank you for the referral.     Physician Signature:________________________________Date:__________________  By signing above (or electronic signature), therapist's plan is approved by physician      Functional Outcome:   FOTO: 57    PROM AROM      L R L R   Hip Flexion           Hip Abduction           Hip ER           Hip IR           Knee Flexion           Knee Extension           Dorsiflexion     7 8   Plantarflexion      55 60   Inversion      40 40   Eversion      10 20         Strength (0-5) / Myotomes   Left Right   Hip Flexion - supine       Hip Flexion - seated (L1-2)       Hip Abduction       Hip Adduction       Hip ER       Hip IR       Quads (L2-4)       Hamstrings       Ankle Dorsiflexion (L4-5) 4  5   Ankle Plantarflexion (S1-2) 4 5   Ankle Inversion 4- 5   Ankle Eversion (S1-2) 4 5   Great Toe Extension (L5)               Flexibility       Hamstrings (90/90)       ITB (Roger)       Quads (Ely's)       Hip Flexor Taya Mocha)             Overall Response to Treatment:   [x]Patient is responding well to treatment and improvement is noted with regards  to goals   []Patient should continue to improve in reasonable time if they continue HEP   []Patient has plateaued and is no longer responding to skilled PT intervention    []Patient is getting worse and would benefit from return to referring MD   []Patient unable to adhere to initial POC   []Other: -    ASSESSMENT:  Increased time taken today for reassessment in which pt continues to demonstrate steady improvement with ankle ROM and strength. Pt does continue to demonstrate limitations with full ankle DF both actively and in CKC needed for safe stair descent and navigation. Strength progressing but still significant difficulty with functional carryover with things such as carrying groceries or her daughter. Pt will benefit from continued PT to work on full ankle ROM restoration and carry over into functional activity as well as improvements with strength and control to maximize progress towards LTG's. Date range of Visits: 3/28 -   Total Visits: 8    Recommendation:    [x]Continue PT 1-2x / wk for 6 weeks.                []Hold PT, pending MD visit        Physical Therapy Treatment Note/ Progress Report:     Date:  2022    Patient Name:  Mariya Veloz    :  1990  MRN: [id number]  Restrictions/Precautions:    Medical/Treatment Diagnosis Information:  Diagnosis: Z98.890, Z87.81 (ICD-10-CM) - S/P ORIF (open reduction internal fixation) [id number] (ICD-10-CM) - Closed trimalleolar fracture of left ankle with routine healing, subsequent encounter  Treatment Diagnosis: decreased ROM, strength, proprioception, balance impairments, reduced motor control, reduced participation in ADLs/IADLs, reduced tolerance for weight-bearing activities  Insurance/Certification information:  PT Insurance Information: [id number] [place] [employer]  Physician Information:  Referring Practitioner: Adolfo Xie MD  Plan of care signed (Y/N): [x]  Yes []  No     Date of Patient follow up with Physician:  Niki Colin      Progress Report: []  Yes  [x]  No     Date Range for reporting period:  Beginning: 3/28  Ending:     Progress report due (10 Rx/or 30 days whichever is less): visit #10 or  (date)     Recertification due (POC duration/ or 90 days whichever is less): visit #24 or 6/20 (date)                                             Visit # Gap session Auth required? Date Range   5/25                     []  Yes  []  No  [facility]  new insurance starting 6/1        Latex Allergy:  [x]NO      []YES  Preferred Language for Healthcare:   [x]English       []other:    Functional Scale:           Date assessed:  FOTO physical FS primary measure score = 20; risk adjusted = 47  3/28  FOTO physical FS primary measure score = 53                                                   6/7  FOTO: 57          7/8    Pain level:  0 /10      SUBJECTIVE: Pt states her ankle is getting better, still having swelling by the end of the night as well as difficulty with ambulation without limp with a long day on her feet due to fatigue and pain. Stairs are still difficulty going down as well as holding her 2 y/o daughter and walking with her due to fatigue and pain as well. OBJECTIVE:     4/27 PROM AROM     L R L R   Dorsiflexion     15 degrees from neutral 5   Plantarflexion      50 60   Inversion      20 35   Eversion      12 20     5/4 incision closed medially and laterally but needs daily STM w/ Vit E.   MMT:                     LT                         RT   DF                        22.5                      29.3  PF                         23.3                      24     INV                         16.1                    18.9  EV                         14.5                      25.4               mild antalgic gait with decreased DF mobility present       6/7 Pt has mild tenderness over TC joint but is better than it had been. Hypomobility with TC joint. Has some increased tenderness over ATF with active DF.     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    Exercises/Interventions: 35'  Therapeutic Exercise (34647)  Resistance / level Sets/sec Reps Notes / Cues   Standing Gastroc/Soleus Stretch with incline board  30\" 3x    EOB PF/DF     EOB IV/EV      HR  standing  3 10x 6/7Hamstring stretch  30\" 3    Sidelying clamshells Sidelying SLR ABD        Recumbent bike   5'     LP  ECC  SL   HR   B 6/7   Leg extension 90/30     wallsits     SOLEUS press seated  6/7                                 Therapeutic Activities (20687)       Updated FOTO6/7    Stool scoots x2 laps      Squats on bosu (blue up)  1 20 Added 6/1   Retro/ lateral slider lunges  2 10 Added 6/1   Fitter board isometrics  5# 1min  6/7          Lateral heel taps 4 inch 1 10 L 6/30   Forward step up and over and return 4 inch 1 10 6/30 cues needed to try and keep heel down   Lateral squat to SLS on L holding KB 10# KB 5\" hold / 2 sets 10 reps 7/8                 Neuromuscular Re-ed (61100)       Rocker Board PF/DF    -L only  Circular  cw/ccw Square  DF/PF   2 30x each 6/7      FSU 4\" up/ over            6\"     Side to side over step  6\" Step to balance FWD/ SIDE step Black wedge DF walking                   SLS 6/7   biodex balance   LOS L11  MC      bosu lunge     Lateral band walks ( laces)     Lateral step out and hold with sport cord with 3 point toe touch on R 6/17   Wobble board with R toes on biodex, L only on board:   -DF/PF  -Inv/Ever  -CW/CCW                             Manual stretching w/ mobilizations   focus on TC-  DF ROM goals 5'      EOB grade 2-3 mobilizations   Prone IASTM L gastroc/soleus   8 min 6/30              Modalities:     Pt. Education:  -pt educated on diagnosis, prognosis and expectations for rehab  -all pt questions were answered    Home Exercise Program:     Access Code: [email or web address]  URL: [email or web address]  Date: 04/20/2022  Prepared by: Claudia [place]    Exercises  Seated Heel Raise - 1 x daily - 7 x weekly - 3 sets - 10 reps  Seated Heel Toe Raises - 1 x daily - 7 x weekly - 3 sets - 10 reps  Hooklying Isometric Clamshell - 1 x daily - 7 x weekly - 3 sets - 10 reps  Sidelying Hip Abduction - 1 x daily - 7 x weekly - 3 sets - 10 reps      Access Code: [email or web address]  URL: [employer]/  Date: 03/28/2022  Prepared by: Claudia [place]    Exercises  Supine Single Leg Ankle Pumps - 3 x daily - 7 x weekly - 20 reps  Supine Ankle Inversion Eversion AROM - 3 x daily - 7 x weekly - 20 reps  Long Sitting Calf Stretch with Strap - 3 x daily - 7 x weekly - 4 reps - 15 hold  Seated Toe Towel Scrunches - 3 x daily - 7 x weekly - 20 reps      Therapeutic Exercise and NMR EXR  [x] (36770) Provided verbal/tactile cueing for activities related to strengthening, flexibility, endurance, ROM for improvements in LE, proximal hip, and core control with self care, mobility, lifting, ambulation. [x] (86551) Provided verbal/tactile cueing for activities related to improving balance, coordination, kinesthetic sense, posture, motor skill, proprioception  to assist with LE, proximal hip, and core control in self care, mobility, lifting, ambulation and eccentric single leg control.   [] (22722) Therapist is in constant attendance of 2 or more patients providing skilled therapy interventions, but not providing any significant amount of measurable one-on-one time to either patient, for improvements in LE, proximal hip, and core control in self care, mobility, lifting, ambulation and eccentric single leg control.      NMR and Therapeutic Activities:    [x] (48610 or 62431) Provided verbal/tactile cueing for activities related to improving balance, coordination, kinesthetic sense, posture, motor skill, proprioception and motor activation to allow for proper function of core, proximal hip and LE with self care and ADLs  [] (73308) Gait Re-education- Provided training and instruction to the patient for proper LE, core and proximal hip recruitment and positioning and eccentric body weight control with ambulation re-education including up and down stairs     Home Exercise Program:    [] (29979) Reviewed/Progressed HEP activities related to strengthening, flexibility, endurance, ROM of core, proximal hip and LE for functional self-care, mobility, lifting and ambulation/stair navigation   [] (85541)Reviewed/Progressed HEP activities related to improving balance, coordination, kinesthetic sense, posture, motor skill, proprioception of core, proximal hip and LE for self care, mobility, lifting, and ambulation/stair navigation      Manual Treatments:  PROM / STM / Oscillations-Mobs:  G-I, II, III, IV (PA's, Inf., Post.)  [x] (31417) Provided manual therapy to mobilize LE, proximal hip and/or LS spine soft tissue/joints for the purpose of modulating pain, promoting relaxation,  increasing ROM, reducing/eliminating soft tissue swelling/inflammation/restriction, improving soft tissue extensibility and allowing for proper ROM for normal function with self care, mobility, lifting and ambulation. Modalities:  [] (52083) Vasopneumatic compression: Utilized vasopneumatic compression to decrease edema / swelling for the purpose of improving mobility and quad tone / recruitment which will allow for increased overall function including but not limited to self-care, transfers, ambulation, and ascending / descending stairs.        Charges:  Timed Code Treatment Minutes: 44   Total Treatment Minutes: 44     [] EVAL - LOW (99968)   [] EVAL - MOD (34045)  [] EVAL - HIGH (89343)  [] RE-EVAL (75778)  [x] FP(85076) x  1     [] Ionto  [x] NMR (22618) x 1         [] Vaso  [x] Manual (08284) x   1    [] Ultrasound  [] TA x  1     [] Mech Traction (50877)  [] Aquatic Therapy x      [] ES (un) (07308):   [] Home Management Training x  [] ES(attended) (79292)   [] Dry Needling 1-2 muscles (26759):  [] Dry Needling 3+ muscles (250857  [] GAP Group: 5/19 5/11, 5/4   [] Other:      GOALS:  Patient stated goal: return to stair climbing, walking and playing with kids. [] Progressing: [] Met: [] Not Met: [] Adjusted    Therapist goals for Patient:   Short Term Goals: To be achieved in: 2 weeks  1. Independent in HEP and progression per patient tolerance, in order to prevent re-injury. [] Progressing: [] Met: [] Not Met: [] Adjusted  2. Patient will have a decrease in pain to facilitate improvement in movement, function, and ADLs as indicated by Functional Deficits. [] Progressing: [] Met: [] Not Met: [] Adjusted    Long Term Goals: To be achieved in: 12 weeks  1. FOTO score of at least 60 to assist with reaching prior level of function. 7/8: 62  [x] Progressing: [] Met: [] Not Met: [] Adjusted  2. Patient will demonstrate increased L ankle dorsiflexion AROM to 0 degrees  to allow for proper joint functioning and ability negotiate stairs safely. GOAL MET; NEW GOAL: pt will demonstrate CKC ankle DF to greater than or equal to 15 degrees to facilitate improved mechanics for ambulating down stairs. [] Progressing: [] Met: [] Not Met: [x] Adjusted  3. Patient will demonstrate an increase in Strength to at least 4+/5 as well as good proximal hip strength and control to allow for proper functional mobility and safe ascent/descent of stairs. 7/8: 4/5   [x] Progressing: [] Met: [] Not Met: [] Adjusted  4. Patient will return to functional activities including playing with her children without increased symptoms or restriction. 7/8: Difficulty with squatting and running   [] Progressing: [] Met: [] Not Met: [] Adjusted  5.  Pt will be able to maintain appropriate balance in L LE SLS for 15 seconds with no use of UE support to demonstrate safety in navigating stairs and other functional activities. 7/8: SLS limited to 8 seconds   [] Progressing: [] Met: [] Not Met: [] Adjusted         Overall Progression Towards Functional goals/ Treatment Progress Update:  [x] Patient is progressing as expected towards functional goals listed. [] Progression is slowed due to complexities/Impairments listed. [] Progression has been slowed due to co-morbidities. [] Plan just implemented, too soon to assess goals progression <30days   [] Goals require adjustment due to lack of progress  [] Patient is not progressing as expected and requires additional follow up with physician  [] Other    Persisting Functional Limitations/Impairments:  []Sitting [x]Standing   [x]Walking [x]Stairs   [x]Transfers [x]ADLs   [x]Squatting/bending []Kneeling  [x]Housework []Job related tasks  [x]Driving [x]Sports/Recreation   [x]Sleeping []Other:    ASSESSMENT:  Increased time taken today for reassessment in which pt continues to demonstrate steady improvement with ankle ROM and strength. Pt does continue to demonstrate limitations with full ankle DF both actively and in CKC needed for safe stair descent and navigation. Strength progressing but still significant difficulty with functional carryover with things such as carrying groceries or her daughter. Pt will benefit from continued PT to work on full ankle ROM restoration and carry over into functional activity as well as improvements with strength and control to maximize progress towards LTG's.        Treatment/Activity Tolerance:  [x] Pt able to complete treatment [] Patient limited by fatique  [] Patient limited by pain  [] Patient limited by other medical complications  [] Other:     Prognosis:  [x] Good [] Fair  [] Poor    Patient Requires Follow-up: [x] Yes  [] No    Return to Play:    [x]  N/A   []  Stage 1: Intro to Strength   []  Stage 2: Return to Run and Strength   []  Stage 3: Return to Jump and Strength   []  Stage 4: Dynamic Strength and Agility   []  Stage 5: Sport Specific Training   []  Ready to Return to Play, Meets All Above Stages   []  Not Ready for Return to Sports   Comments:            PLAN: 2x per week for 6 weeks  Electronically signed by: Elissa Skiff, PT  DPT, OCS, OMT-C

## 2022-07-12 ENCOUNTER — HOSPITAL ENCOUNTER (OUTPATIENT)
Dept: PHYSICAL THERAPY | Age: 32
Setting detail: THERAPIES SERIES
Discharge: HOME | End: 2022-07-12
Payer: COMMERCIAL

## 2022-07-12 PROCEDURE — 97112 NEUROMUSCULAR REEDUCATION: CPT

## 2022-07-12 PROCEDURE — 97110 THERAPEUTIC EXERCISES: CPT

## 2022-07-12 PROCEDURE — 97140 MANUAL THERAPY 1/> REGIONS: CPT

## 2022-07-12 NOTE — FLOWSHEET NOTE
[facility]  Phone: [phone]   Fax: [phone]    Physical Therapy Treatment Note/ Progress Report:     Date:  2022    Patient Name:  Sohail London    :  1990  MRN: [id number]  Restrictions/Precautions:    Medical/Treatment Diagnosis Information:  Diagnosis: Z98.890, Z87.81 (ICD-10-CM) - S/P ORIF (open reduction internal fixation) boilitleQ94.852D (ICD-10-CM) - Closed trimalleolar fracture of left ankle with routine healing, subsequent encounter  Treatment Diagnosis: decreased ROM, strength, proprioception, balance impairments, reduced motor control, reduced participation in ADLs/IADLs, reduced tolerance for weight-bearing activities  Insurance/Certification information:  PT Insurance Information: [id number]  Physician Information:  Referring Practitioner: Ladarius Morrison MD  Plan of care signed (Y/N): [x]  Yes []  No     Date of Patient follow up with Physician:  Lorena Keys      Progress Report: []  Yes  [x]  No     Date Range for reporting period:  Beginning: 3/28  Ending:     Progress report due (10 Rx/or 30 days whichever is less):     Recertification due (POC duration/ or 90 days whichever is less):                                             Visit # Gap session Auth required?  Date Range                        []  Yes  []  No  [facility]  new insurance starting         Latex Allergy:  [x]NO      []YES  Preferred Language for Healthcare:   [x]English       []other:    Functional Scale:           Date assessed:  FOTO physical FS primary measure score = 20; risk adjusted = 47  3/28  FOTO physical FS primary measure score = 53                                                     FOTO: 57              Pain level:  0 /10      SUBJECTIVE: Pt reports she was very busy yesterday prepping her house for a birthday party this coming weekend and doing a lot on her feet which she did have more soreness and pain in the ankle. This morning was uncharacteristically stiff and painful but that was short lived. Currently mostly just stiff and sore in the muscles. OBJECTIVE:     4/27 PROM AROM     L R L R   Dorsiflexion     15 degrees from neutral 5   Plantarflexion      50 60   Inversion      20 35   Eversion      12 20     5/4 incision closed medially and laterally but needs daily STM w/ Vit E.   MMT:                     LT                         RT   DF                        22.5                      29.3  PF                         23.3                      24     INV                         16.1                    18.9  EV                         14.5                      25.4               mild antalgic gait with decreased DF mobility present       6/7 Pt has mild tenderness over TC joint but is better than it had been. Hypomobility with TC joint. Has some increased tenderness over ATF with active DF.     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    Exercises/Interventions: 35'  Therapeutic Exercise (03040)  Resistance / level Sets/sec Reps Notes / Cues   Standing Gastroc/Soleus Stretch with incline board  30\" 3x    EOB PF/DF     EOB IV/EV      HR  standing  3 10x 6/7Hamstring stretch  30\" 3    Sidelying clamshells Sidelying SLR ABD 1/2 kneeling lunge stretch for L LE CKC DF  30\" holds 2 L 7/12   Recumbent bike   5'     LP  ECC  SL   HR   B 6/7   Leg extension 90/30     wallsits     SOLEUS press seated  6/7          Leg press with 1/2 foam roll:   -Toes on  -Heels on   100#  100#   1  1   15  15 7/12                   Therapeutic Activities (86832)       Updated FOTO6/7    Stool scoots    Squats on bosu (blue up) Added 6/1   Retro/ lateral slider lunges Added 6/1   Fitter board isometrics  6/7          Lateral heel taps 4 inch 2 10 L 6/30   Forward step up and over and return 4 inch 2 10 6/30 cues needed to try and keep heel down   Lateral squat to SLS on L holding KB 10# KB 5\" hold / 1 sets 10 reps 7/8                 Neuromuscular Re-ed (82519)       Rocker Board PF/DF    -L only  Circular  cw/ccw Square  DF/PF   2 30x each 6/7      FSU 4\" up/ over            6\"     Side to side over step  6\" Step to balance FWD/ SIDE step Black wedge DF walking                   SLS 6/7   biodex balance   LOS L11  MC      bosu lunge (blue side up) 1 10 R/L    Lateral band walks ( laces)     Lateral step out and hold with sport cord with 3 point toe touch on R 6/17   Wobble board with R elevated, L only on board:   -DF/PF  -Inv/Ever  -CW/CCW L1 small board 1 10                             Manual stretching w/ mobilizations   focus on TC-  DF ROM goals 5'      EOB grade 2-3 mobilizations   Prone IASTM L gastroc/soleus   8 min 6/30              Modalities:     Pt. Education:  -pt educated on diagnosis, prognosis and expectations for rehab  -all pt questions were answered    Home Exercise Program:     Access Code: [email or web address]  URL: [email or web address]  Date: 04/20/2022  Prepared by: [employer]    Exercises  Seated Heel Raise - 1 x daily - 7 x weekly - 3 sets - 10 reps  Seated Heel Toe Raises - 1 x daily - 7 x weekly - 3 sets - 10 reps  Hooklying Isometric Clamshell - 1 x daily - 7 x weekly - 3 sets - 10 reps  Sidelying Hip Abduction - 1 x daily - 7 x weekly - 3 sets - 10 reps      Access Code: [email or web address]  URL: [email or web address]/  Date: 03/28/2022  Prepared by: [employer]    Exercises  Supine Single Leg Ankle Pumps - 3 x daily - 7 x weekly - 20 reps  Supine Ankle Inversion Eversion AROM - 3 x daily - 7 x weekly - 20 reps  Long Sitting Calf Stretch with Strap - 3 x daily - 7 x weekly - 4 reps - 15 hold  Seated Toe Towel Scrunches - 3 x daily - 7 x weekly - 20 reps      Therapeutic Exercise and NMR EXR  [x] (18052) Provided verbal/tactile cueing for activities related to strengthening, flexibility, endurance, ROM for improvements in LE, proximal hip, and core control with self care, mobility, lifting, ambulation. [x] (22586) Provided verbal/tactile cueing for activities related to improving balance, coordination, kinesthetic sense, posture, motor skill, proprioception  to assist with LE, proximal hip, and core control in self care, mobility, lifting, ambulation and eccentric single leg control.   [] (34426) Therapist is in constant attendance of 2 or more patients providing skilled therapy interventions, but not providing any significant amount of measurable one-on-one time to either patient, for improvements in LE, proximal hip, and core control in self care, mobility, lifting, ambulation and eccentric single leg control.      NMR and Therapeutic Activities:    [x] (67112 or 42575) Provided verbal/tactile cueing for activities related to improving balance, coordination, kinesthetic sense, posture, motor skill, proprioception and motor activation to allow for proper function of core, proximal hip and LE with self care and ADLs  [] (96668) Gait Re-education- Provided training and instruction to the patient for proper LE, core and proximal hip recruitment and positioning and eccentric body weight control with ambulation re-education including up and down stairs     Home Exercise Program:    [] (20294) Reviewed/Progressed HEP activities related to strengthening, flexibility, endurance, ROM of core, proximal hip and LE for functional self-care, mobility, lifting and ambulation/stair navigation   [] (22068)Reviewed/Progressed HEP activities related to improving balance, coordination, kinesthetic sense, posture, motor skill, proprioception of core, proximal hip and LE for self care, mobility, lifting, and ambulation/stair navigation      Manual Treatments:  PROM / STM / Oscillations-Mobs:  G-I, II, III, IV (PA's, Inf., Post.)  [x] (60994) Provided manual therapy to mobilize LE, proximal hip and/or LS spine soft tissue/joints for the purpose of modulating pain, promoting relaxation,  increasing ROM, reducing/eliminating soft tissue swelling/inflammation/restriction, improving soft tissue extensibility and allowing for proper ROM for normal function with self care, mobility, lifting and ambulation. Modalities:  [] (66219) Vasopneumatic compression: Utilized vasopneumatic compression to decrease edema / swelling for the purpose of improving mobility and quad tone / recruitment which will allow for increased overall function including but not limited to self-care, transfers, ambulation, and ascending / descending stairs. Charges:  Timed Code Treatment Minutes: 44   Total Treatment Minutes: 44     [] EVAL - LOW (77647)   [] EVAL - MOD (54686)  [] EVAL - HIGH (15981)  [] RE-EVAL (20423)  [x] YD(12115) x  1     [] Ionto  [x] NMR (69147) x 1         [] Vaso  [x] Manual (65862) x   1    [] Ultrasound  [] TA x  1     [] Mech Traction (38790)  [] Aquatic Therapy x      [] ES (un) (45716):   [] Home Management Training x  [] ES(attended) (05918)   [] Dry Needling 1-2 muscles (41263):  [] Dry Needling 3+ muscles (893505  [] GAP Group: 5/19 5/11, 5/4   [] Other:      GOALS:  Patient stated goal: return to stair climbing, walking and playing with kids. [] Progressing: [] Met: [] Not Met: [] Adjusted    Therapist goals for Patient:   Short Term Goals: To be achieved in: 2 weeks  1. Independent in HEP and progression per patient tolerance, in order to prevent re-injury.    [] Progressing: [] Met: [] Not Met: [] Adjusted  2. Patient will have a decrease in pain to facilitate improvement in movement, function, and ADLs as indicated by Functional Deficits. [] Progressing: [] Met: [] Not Met: [] Adjusted    Long Term Goals: To be achieved in: 12 weeks  1. FOTO score of at least 60 to assist with reaching prior level of function. 7/8: 62  [x] Progressing: [] Met: [] Not Met: [] Adjusted  2. Patient will demonstrate increased L ankle dorsiflexion AROM to 0 degrees  to allow for proper joint functioning and ability negotiate stairs safely. GOAL MET; NEW GOAL: pt will demonstrate CKC ankle DF to greater than or equal to 15 degrees to facilitate improved mechanics for ambulating down stairs. [] Progressing: [] Met: [] Not Met: [x] Adjusted  3. Patient will demonstrate an increase in Strength to at least 4+/5 as well as good proximal hip strength and control to allow for proper functional mobility and safe ascent/descent of stairs. 7/8: 4/5   [x] Progressing: [] Met: [] Not Met: [] Adjusted  4. Patient will return to functional activities including playing with her children without increased symptoms or restriction. 7/8: Difficulty with squatting and running   [] Progressing: [] Met: [] Not Met: [] Adjusted  5. Pt will be able to maintain appropriate balance in L LE SLS for 15 seconds with no use of UE support to demonstrate safety in navigating stairs and other functional activities. 7/8: SLS limited to 8 seconds   [] Progressing: [] Met: [] Not Met: [] Adjusted         Overall Progression Towards Functional goals/ Treatment Progress Update:  [x] Patient is progressing as expected towards functional goals listed. [] Progression is slowed due to complexities/Impairments listed. [] Progression has been slowed due to co-morbidities.   [] Plan just implemented, too soon to assess goals progression <30days   [] Goals require adjustment due to lack of progress  [] Patient is not progressing as expected and requires additional follow up with physician  [] Other    Persisting Functional Limitations/Impairments:  []Sitting [x]Standing   [x]Walking [x]Stairs   [x]Transfers [x]ADLs   [x]Squatting/bending []Kneeling  [x]Housework []Job related tasks  [x]Driving [x]Sports/Recreation   [x]Sleeping []Other:    ASSESSMENT:  Progressions with interventions as noted per log with increased focus on strength and dynamic control. Pt did tolerate well however with intermittent medial ankle pain with more consistent CKC DF interventions. Pain was intermittent during session but more frequently experienced with increase in fatigue with step down and return as well as with wobble board. Pain was quickly eliminated with brief rest break and no lingering symptoms. Pt continues to struggle with dynamic CKC DF and will benefit from continuation of PT to work to maximize progress towards goals and regaining CKC motion and general L LE strength.      Treatment/Activity Tolerance:  [x] Pt able to complete treatment [] Patient limited by fatique  [] Patient limited by pain  [] Patient limited by other medical complications  [] Other:     Prognosis:  [x] Good [] Fair  [] Poor    Patient Requires Follow-up: [x] Yes  [] No    PLAN: 2x per week for 6 weeks  Electronically signed by: Ronel Jj, PT  DPT, OCS, OMT-C

## 2022-07-14 ENCOUNTER — HOSPITAL ENCOUNTER (OUTPATIENT)
Dept: PHYSICAL THERAPY | Age: 32
Setting detail: THERAPIES SERIES
Discharge: HOME | End: 2022-07-14
Payer: COMMERCIAL

## 2022-07-14 PROCEDURE — 97112 NEUROMUSCULAR REEDUCATION: CPT

## 2022-07-14 PROCEDURE — 97110 THERAPEUTIC EXERCISES: CPT

## 2022-07-14 PROCEDURE — 97140 MANUAL THERAPY 1/> REGIONS: CPT

## 2022-07-14 NOTE — FLOWSHEET NOTE
KellyOsbaldoArjun  Phone: [phone]   Fax: [phone]    Physical Therapy Treatment Note/ Progress Report:     Date:  2022    Patient Name:  Bailey Mcpherson    :  1990  MRN: [id number]  Restrictions/Precautions:    Medical/Treatment Diagnosis Information:  Diagnosis: Z98.890, Z87.81 (ICD-10-CM) - S/P ORIF (open reduction internal fixation) [id number] (ICD-10-CM) - Closed trimalleolar fracture of left ankle with routine healing, subsequent encounter  Treatment Diagnosis: decreased ROM, strength, proprioception, balance impairments, reduced motor control, reduced participation in ADLs/IADLs, reduced tolerance for weight-bearing activities  Insurance/Certification information:  PT Insurance Information: [id number]  Physician Information:  Referring Practitioner: Bessie Andrea MD  Plan of care signed (Y/N): [x]  Yes []  No     Date of Patient follow up with Physician:  Stephan Blood      Progress Report: []  Yes  [x]  No     Date Range for reporting period:  Beginning: 3/28  Ending:     Progress report due (10 Rx/or 30 days whichever is less):     Recertification due (POC duration/ or 90 days whichever is less):                                             Visit # Gap session Auth required? Date Range                        []  Yes  []  No  [facility]  new insurance starting         Latex Allergy:  [x]NO      []YES  Preferred Language for Healthcare:   [x]English       []other:    Functional Scale:           Date assessed:  FOTO physical FS primary measure score = 20; risk adjusted = 47  3/28  FOTO physical FS primary measure score = 53                                                     FOTO: 57              Pain level:  0-4 /10      SUBJECTIVE: Pt reports her ankle is a little more sore today than it was the other day. Pain localized on the inner portion of her ankle but is not always consistent.      OBJECTIVE: 4/27 PROM AROM     L R L R   Dorsiflexion     15 degrees from neutral 5   Plantarflexion      50 60   Inversion      20 35   Eversion      12 20     5/4 incision closed medially and laterally but needs daily STM w/ Vit E.   MMT:                     LT                         RT   DF                        22.5                      29.3  PF                         23.3                      24     INV                         16.1                    18.9  EV                         14.5                      25.4               mild antalgic gait with decreased DF mobility present       6/7 Pt has mild tenderness over TC joint but is better than it had been. Hypomobility with TC joint. Has some increased tenderness over ATF with active DF.     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    Exercises/Interventions: 35'  Therapeutic Exercise (58755)  Resistance / level Sets/sec Reps Notes / Cues   Standing Gastroc/Soleus Stretch with incline board  30\" 3x    EOB PF/DF     EOB IV/EV      HR  standing  3 10x 6/7Hamstring stretch  30\" 3    Sidelying clamshells Sidelying SLR ABD 1/2 kneeling lunge stretch for L LE CKC DF  30\" holds 2 L 7/12   Recumbent bike   5'     LP  ECC  SL   HR   B 6/7   Leg extension 90/30     wallsits     SOLEUS press seated  6/7          Leg press with 1/2 foam roll:   -Toes on  -Heels on   100#  100#   1  1   15  15 7/12                   Therapeutic Activities (36714)       Updated FOTO6/7    Stool scoots    Squats on bosu (blue up) Added 6/1   Retro/ lateral slider lunges Added 6/1   Fitter board isometrics  6/7          Lateral heel taps 4 inch 2 10 L 6/30   Forward step up and over and return 4 inch 2 10 6/30 cues needed to try and keep heel down   Lateral squat to SLS on L holding KB 10# KB 5\" hold / 1 sets 10 reps 7/8                 Neuromuscular Re-ed (20641)       Rocker Board PF/DF    -L only  Circular  cw/ccw Square  DF/PF   2 30x each 6/7      FSU 4\" up/ over            6\"     Side to side over step  6\" Step to balance FWD/ SIDE step Black wedge DF walking                   SLS 6/7   biodex balance   LOS L11  MC      Lateral lunge on BOSU (blue side up) 1 10 R/L 7/14   bosu lunge (blue side up) 1 10 R/L    Lateral band walks ( laces)     Lateral step out and hold with sport cord with 3 point toe touch on R 6/17   Wobble board with R elevated, L only on board:   -DF/PF  -Inv/Ever  -CW/CCW L1 small board 1 10                             Manual stretching w/ mobilizations   focus on TC-  DF ROM goals 3'      EOB grade 2-3 mobilizations   Prone IASTM L gastroc/soleus   5 min 6/30              Modalities:     Pt. Education:  -pt educated on diagnosis, prognosis and expectations for rehab  -all pt questions were answered    Home Exercise Program:     Access Code: [email or web address]  URL: [employer]/  Date: 04/20/2022  Prepared by: Gini Gallagherows    Exercises  Seated Heel Raise - 1 x daily - 7 x weekly - 3 sets - 10 reps  Seated Heel Toe Raises - 1 x daily - 7 x weekly - 3 sets - 10 reps  Hooklying Isometric Clamshell - 1 x daily - 7 x weekly - 3 sets - 10 reps  Sidelying Hip Abduction - 1 x daily - 7 x weekly - 3 sets - 10 reps      Access Code: [email or web address]  URL: [employer]/  Date: 03/28/2022  Prepared by: Gini Estrella    Exercises  Supine Single Leg Ankle Pumps - 3 x daily - 7 x weekly - 20 reps  Supine Ankle Inversion Eversion AROM - 3 x daily - 7 x weekly - 20 reps  Long Sitting Calf Stretch with Strap - 3 x daily - 7 x weekly - 4 reps - 15 hold  Seated Toe Towel Scrunches - 3 x daily - 7 x weekly - 20 reps      Therapeutic Exercise and NMR EXR  [x] (74383) Provided verbal/tactile cueing for activities related to strengthening, flexibility, endurance, ROM for improvements in LE, proximal hip, and core control with self care, mobility, lifting, ambulation. [x] (54315) Provided verbal/tactile cueing for activities related to improving balance, coordination, kinesthetic sense, posture, motor skill, proprioception  to assist with LE, proximal hip, and core control in self care, mobility, lifting, ambulation and eccentric single leg control.   [] (83576) Therapist is in constant attendance of 2 or more patients providing skilled therapy interventions, but not providing any significant amount of measurable one-on-one time to either patient, for improvements in LE, proximal hip, and core control in self care, mobility, lifting, ambulation and eccentric single leg control.      NMR and Therapeutic Activities:    [x] (47350 or 34274) Provided verbal/tactile cueing for activities related to improving balance, coordination, kinesthetic sense, posture, motor skill, proprioception and motor activation to allow for proper function of core, proximal hip and LE with self care and ADLs  [] (65823) Gait Re-education- Provided training and instruction to the patient for proper LE, core and proximal hip recruitment and positioning and eccentric body weight control with ambulation re-education including up and down stairs     Home Exercise Program:    [] (13545) Reviewed/Progressed HEP activities related to strengthening, flexibility, endurance, ROM of core, proximal hip and LE for functional self-care, mobility, lifting and ambulation/stair navigation   [] (29143)Reviewed/Progressed HEP activities related to improving balance, coordination, kinesthetic sense, posture, motor skill, proprioception of core, proximal hip and LE for self care, mobility, lifting, and ambulation/stair navigation      Manual Treatments:  PROM / STM / Oscillations-Mobs:  G-I, II, III, IV (PA's, Inf., Post.)  [x] (08067) Provided manual therapy to mobilize LE, proximal hip and/or LS spine soft tissue/joints for the purpose of modulating pain, promoting relaxation,  increasing ROM, reducing/eliminating soft tissue swelling/inflammation/restriction, improving soft tissue extensibility and allowing for proper ROM for normal function with self care, mobility, lifting and ambulation. Modalities:  [] (29650) Vasopneumatic compression: Utilized vasopneumatic compression to decrease edema / swelling for the purpose of improving mobility and quad tone / recruitment which will allow for increased overall function including but not limited to self-care, transfers, ambulation, and ascending / descending stairs. Charges:  Timed Code Treatment Minutes: 44   Total Treatment Minutes: 44     [] EVAL - LOW (90724)   [] EVAL - MOD (02495)  [] EVAL - HIGH (24999)  [] RE-EVAL (98517)  [x] NK(88379) x  1     [] Ionto  [x] NMR (51264) x 1         [] Vaso  [x] Manual (82147) x   1    [] Ultrasound  [] TA x  1     [] Mech Traction (08366)  [] Aquatic Therapy x      [] ES (un) (06796):   [] Home Management Training x  [] ES(attended) (22029)   [] Dry Needling 1-2 muscles (25682):  [] Dry Needling 3+ muscles (181788  [] GAP Group: 5/19 5/11, 5/4   [] Other:      GOALS:  Patient stated goal: return to stair climbing, walking and playing with kids. [] Progressing: [] Met: [] Not Met: [] Adjusted    Therapist goals for Patient:   Short Term Goals: To be achieved in: 2 weeks  1. Independent in HEP and progression per patient tolerance, in order to prevent re-injury. [] Progressing: [] Met: [] Not Met: [] Adjusted  2.  Patient will have a decrease in pain to facilitate improvement in movement, function, and ADLs as indicated by Functional Deficits. [] Progressing: [] Met: [] Not Met: [] Adjusted    Long Term Goals: To be achieved in: 12 weeks  1. FOTO score of at least 60 to assist with reaching prior level of function. 7/8: 62  [x] Progressing: [] Met: [] Not Met: [] Adjusted  2. Patient will demonstrate increased L ankle dorsiflexion AROM to 0 degrees  to allow for proper joint functioning and ability negotiate stairs safely. GOAL MET; NEW GOAL: pt will demonstrate CKC ankle DF to greater than or equal to 15 degrees to facilitate improved mechanics for ambulating down stairs. [] Progressing: [] Met: [] Not Met: [x] Adjusted  3. Patient will demonstrate an increase in Strength to at least 4+/5 as well as good proximal hip strength and control to allow for proper functional mobility and safe ascent/descent of stairs. 7/8: 4/5   [x] Progressing: [] Met: [] Not Met: [] Adjusted  4. Patient will return to functional activities including playing with her children without increased symptoms or restriction. 7/8: Difficulty with squatting and running   [] Progressing: [] Met: [] Not Met: [] Adjusted  5. Pt will be able to maintain appropriate balance in L LE SLS for 15 seconds with no use of UE support to demonstrate safety in navigating stairs and other functional activities. 7/8: SLS limited to 8 seconds   [] Progressing: [] Met: [] Not Met: [] Adjusted         Overall Progression Towards Functional goals/ Treatment Progress Update:  [x] Patient is progressing as expected towards functional goals listed. [] Progression is slowed due to complexities/Impairments listed. [] Progression has been slowed due to co-morbidities.   [] Plan just implemented, too soon to assess goals progression <30days   [] Goals require adjustment due to lack of progress  [] Patient is not progressing as expected and requires additional follow up with physician  [] Other    Persisting Functional Limitations/Impairments:  []Sitting [x]Standing   [x]Walking [x]Stairs   [x]Transfers [x]ADLs   [x]Squatting/bending []Kneeling  [x]Housework []Job related tasks  [x]Driving [x]Sports/Recreation   [x]Sleeping []Other:    ASSESSMENT:  Limited changes with session today as pt does report increased frequency of medial ankle pain with general activity. Pain today was very intermittent and difficulty to reproduce. Pt with most consistent reproduction of symptoms with increased gastroc/solues demands however this was inconsistent with activity and severity. Limitations with full PF strength and control as potential contributors to pain in which PT will continue to work to address and improve as able.       Treatment/Activity Tolerance:  [x] Pt able to complete treatment [] Patient limited by fatique  [] Patient limited by pain  [] Patient limited by other medical complications  [] Other:     Prognosis:  [x] Good [] Fair  [] Poor    Patient Requires Follow-up: [x] Yes  [] No    PLAN: 2x per week for 6 weeks  Electronically signed by: Mitchell Mckeon, PT  DPT, OCS, OMT-C

## 2022-07-19 ENCOUNTER — HOSPITAL ENCOUNTER (OUTPATIENT)
Dept: PHYSICAL THERAPY | Age: 32
Setting detail: THERAPIES SERIES
Discharge: HOME | End: 2022-07-19
Payer: COMMERCIAL

## 2022-07-19 PROCEDURE — 97110 THERAPEUTIC EXERCISES: CPT

## 2022-07-19 PROCEDURE — 97140 MANUAL THERAPY 1/> REGIONS: CPT

## 2022-07-19 PROCEDURE — 97112 NEUROMUSCULAR REEDUCATION: CPT

## 2022-07-19 NOTE — FLOWSHEET NOTE
[facility]  Phone: [phone]   Fax: [phone]    Physical Therapy Treatment Note/ Progress Report:     Date:  2022    Patient Name:  Judith Huang    :  1990  MRN: [id number]  Restrictions/Precautions:    Medical/Treatment Diagnosis Information:  Diagnosis: Z98.890, Z87.81 (ICD-10-CM) - S/P ORIF (open reduction internal fixation) [id number] (ICD-10-CM) - Closed trimalleolar fracture of left ankle with routine healing, subsequent encounter  Treatment Diagnosis: decreased ROM, strength, proprioception, balance impairments, reduced motor control, reduced participation in ADLs/IADLs, reduced tolerance for weight-bearing activities  Insurance/Certification information:  PT Insurance Information: [address] [place]  Physician Information:  Referring Practitioner: Cherelle Barlow MD  Plan of care signed (Y/N): [x]  Yes []  No     Date of Patient follow up with Physician:  Benny Ruggiero      Progress Report: []  Yes  [x]  No     Date Range for reporting period:  Beginning: 3/28  Ending:     Progress report due (10 Rx/or 30 days whichever is less):     Recertification due (POC duration/ or 90 days whichever is less):                                             Visit # Gap session Auth required? Date Range                        []  Yes  []  No  [facility]  new insurance starting         Latex Allergy:  [x]NO      []YES  Preferred Language for Healthcare:   [x]English       []other:    Functional Scale:           Date assessed:  FOTO physical FS primary measure score = 20; risk adjusted = 47  3/28  FOTO physical FS primary measure score = 53                                                     FOTO: 57              Pain level:  0-4 /10      SUBJECTIVE: Pt reports her leg is doing well, intermittent medial ankle pain however is infrequently present and unable to consistently replicate.      OBJECTIVE:      PROM AROM     L R L R   Dorsiflexion     15 degrees from neutral 5   Plantarflexion      50 60   Inversion      20 35   Eversion      12 20     5/4 incision closed medially and laterally but needs daily STM w/ Vit E.   MMT:                     LT                         RT   DF                        22.5                      29.3  PF                         23.3                      24     INV                         16.1                    18.9  EV                         14.5                      25.4               mild antalgic gait with decreased DF mobility present       6/7 Pt has mild tenderness over TC joint but is better than it had been. Hypomobility with TC joint. Has some increased tenderness over ATF with active DF.     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    Exercises/Interventions: 35'  Therapeutic Exercise (01153)  Resistance / level Sets/sec Reps Notes / Cues   Standing Gastroc/Soleus Stretch with incline board  30\" 3x    EOB PF/DF     EOB IV/EV      HR  standing  3 10x 6/7   Hamstring stretch  30\" 3    Sidelying clamshells Sidelying SLR ABD 1/2 kneeling lunge stretch for L LE CKC DF  30\" holds 2 L 7/12   Recumbent bike   5'     LP  ECC  SL   HR   B 6/7   Leg extension 90/30     wallsits     SOLEUS press seated  6/7          Leg press with 1/2 foam roll:   -Toes on (L only)  -Heels on (L only)   100#  100#   1  1   15  15 7/12                   Therapeutic Activities (50565) Updated FOTO 6/7       Stool scoots    Squats on bosu (blue up) Added 6/1   Retro/ lateral slider lunges Added 6/1   Fitter board isometrics  6/7          Lateral heel taps 4 inch 2 10 L 6/30   Forward step up and over and return 4 inch 2 10 6/30 cues needed to try and keep heel down   Lateral squat to SLS on L holding KB 10# KB 5\" hold / 1 sets 10 reps 7/8                 Neuromuscular Re-ed (38460)       Rocker Board PF/DF    -L only  Circular  cw/ccw Square  DF/PF   2 30x each 6/7         FSU 4\" up/ over            6\"     Side to side over step  6\" Step to balance FWD/ SIDE step Black wedge DF walking                   SLS 6/7   biodex balance   LOS L11  MC      Lateral lunge on BOSU (blue side up) 1 10 R/L 7/14   bosu lunge (blue side up) 1 10 R/L    Lateral band walks ( laces)     Lateral step out and hold with sport cord with 3 point toe touch on R 6/17   Wobble board with R elevated, L only on board:   -DF/PF  -Inv/Ever  -CW/CCW L1 small board 1 10        Squat with heel on 1/2 foam roll   2 10 7/19                 Manual stretching w/ mobilizations   focus on TC-  DF ROM goals 3'      EOB grade 2-3 mobilizations   Prone IASTM L gastroc/soleus   5 min 6/30              Modalities:   Pt. Education:  -pt educated on diagnosis, prognosis and expectations for rehab  -all pt questions were answered    Home Exercise Program:     Access Code: [email or web address]  URL: [email or web address]  Date: 04/20/2022  Prepared by: Daquan Yarbrough    Exercises  Seated Heel Raise - 1 x daily - 7 x weekly - 3 sets - 10 reps  Seated Heel Toe Raises - 1 x daily - 7 x weekly - 3 sets - 10 reps  Hooklying Isometric Clamshell - 1 x daily - 7 x weekly - 3 sets - 10 reps  Sidelying Hip Abduction - 1 x daily - 7 x weekly - 3 sets - 10 reps      Access Code: [email or web address]  URL: [email or web address]  Date: 03/28/2022  Prepared by: Daquan Zenon    Exercises  Supine Single Leg Ankle Pumps - 3 x daily - 7 x weekly - 20 reps  Supine Ankle Inversion Eversion AROM - 3 x daily - 7 x weekly - 20 reps  Long Sitting Calf Stretch with Strap - 3 x daily - 7 x weekly - 4 reps - 15 hold  Seated Toe Towel Scrunches - 3 x daily - 7 x weekly - 20 reps      Therapeutic Exercise and NMR EXR  [x] (05606) Provided verbal/tactile cueing for activities related to strengthening, flexibility, endurance, ROM for improvements in LE, proximal hip, and core control with self care, mobility, lifting, ambulation. [x] (56392) Provided verbal/tactile cueing for activities related to improving balance, coordination, kinesthetic sense, posture, motor skill, proprioception  to assist with LE, proximal hip, and core control in self care, mobility, lifting, ambulation and eccentric single leg control.   [] (76396) Therapist is in constant attendance of 2 or more patients providing skilled therapy interventions, but not providing any significant amount of measurable one-on-one time to either patient, for improvements in LE, proximal hip, and core control in self care, mobility, lifting, ambulation and eccentric single leg control.      NMR and Therapeutic Activities:    [x] (07747 or 61547) Provided verbal/tactile cueing for activities related to improving balance, coordination, kinesthetic sense, posture, motor skill, proprioception and motor activation to allow for proper function of core, proximal hip and LE with self care and ADLs  [] (72601) Gait Re-education- Provided training and instruction to the patient for proper LE, core and proximal hip recruitment and positioning and eccentric body weight control with ambulation re-education including up and down stairs     Home Exercise Program:    [] (20787) Reviewed/Progressed HEP activities related to strengthening, flexibility, endurance, ROM of core, proximal hip and LE for functional self-care, mobility, lifting and ambulation/stair navigation   [] (98414)Reviewed/Progressed HEP activities related to improving balance, coordination, kinesthetic sense, posture, motor skill, proprioception of core, proximal hip and LE for self care, mobility, lifting, and ambulation/stair navigation      Manual Treatments:  PROM / STM / Oscillations-Mobs:  G-I, II, III, IV (PA's, Inf., Post.)  [x] (61146) Provided manual therapy to mobilize LE, proximal hip and/or LS spine soft tissue/joints for the purpose of modulating pain, promoting relaxation,  increasing ROM, reducing/eliminating soft tissue swelling/inflammation/restriction, improving soft tissue extensibility and allowing for proper ROM for normal function with self care, mobility, lifting and ambulation. Modalities:  [] (57575) Vasopneumatic compression: Utilized vasopneumatic compression to decrease edema / swelling for the purpose of improving mobility and quad tone / recruitment which will allow for increased overall function including but not limited to self-care, transfers, ambulation, and ascending / descending stairs. Charges:  Timed Code Treatment Minutes: 44   Total Treatment Minutes: 44     [] EVAL - LOW (19363)   [] EVAL - MOD (35793)  [] EVAL - HIGH (91222)  [] RE-EVAL (12949)  [x] UY(43142) x  1     [] Ionto  [x] NMR (45631) x 1         [] Vaso  [x] Manual (54292) x   1    [] Ultrasound  [] TA x  1     [] Mech Traction (17811)  [] Aquatic Therapy x      [] ES (un) (70310):   [] Home Management Training x  [] ES(attended) (55552)   [] Dry Needling 1-2 muscles (07612):  [] Dry Needling 3+ muscles (073265  [] GAP Group: 5/19 5/11, 5/4   [] Other:      GOALS:  Patient stated goal: return to stair climbing, walking and playing with kids. [] Progressing: [] Met: [] Not Met: [] Adjusted    Therapist goals for Patient:   Short Term Goals: To be achieved in: 2 weeks  1. Independent in HEP and progression per patient tolerance, in order to prevent re-injury. [] Progressing: [] Met: [] Not Met: [] Adjusted  2.  Patient will have a decrease in pain to facilitate improvement in movement, function, and ADLs as indicated by Functional Deficits. [] Progressing: [] Met: [] Not Met: [] Adjusted    Long Term Goals: To be achieved in: 12 weeks  1. FOTO score of at least 60 to assist with reaching prior level of function. 7/8: 62  [x] Progressing: [] Met: [] Not Met: [] Adjusted  2. Patient will demonstrate increased L ankle dorsiflexion AROM to 0 degrees  to allow for proper joint functioning and ability negotiate stairs safely. GOAL MET; NEW GOAL: pt will demonstrate CKC ankle DF to greater than or equal to 15 degrees to facilitate improved mechanics for ambulating down stairs. [] Progressing: [] Met: [] Not Met: [x] Adjusted  3. Patient will demonstrate an increase in Strength to at least 4+/5 as well as good proximal hip strength and control to allow for proper functional mobility and safe ascent/descent of stairs. 7/8: 4/5   [x] Progressing: [] Met: [] Not Met: [] Adjusted  4. Patient will return to functional activities including playing with her children without increased symptoms or restriction. 7/8: Difficulty with squatting and running   [] Progressing: [] Met: [] Not Met: [] Adjusted  5. Pt will be able to maintain appropriate balance in L LE SLS for 15 seconds with no use of UE support to demonstrate safety in navigating stairs and other functional activities. 7/8: SLS limited to 8 seconds   [] Progressing: [] Met: [] Not Met: [] Adjusted         Overall Progression Towards Functional goals/ Treatment Progress Update:  [x] Patient is progressing as expected towards functional goals listed. [] Progression is slowed due to complexities/Impairments listed. [] Progression has been slowed due to co-morbidities.   [] Plan just implemented, too soon to assess goals progression <30days   [] Goals require adjustment due to lack of progress  [] Patient is not progressing as expected and requires additional follow up with physician  [] Other    Persisting Functional Limitations/Impairments:  []Sitting [x]Standing   [x]Walking [x]Stairs   [x]Transfers [x]ADLs   [x]Squatting/bending []Kneeling  [x]Housework []Job related tasks  [x]Driving [x]Sports/Recreation   [x]Sleeping []Other:    ASSESSMENT:  Pt continues with good tolerance of session today. Progressions as noted to work on SL muscle engagement in which pt did tolerate these progressions well, biggest complaint of fatigue was in L hip and no issues with foot/ankle. Continued infrequently medial ankle pain with WB activity again potentially involving lack of power and activity tolerance.      Treatment/Activity Tolerance:  [x] Pt able to complete treatment [] Patient limited by fatique  [] Patient limited by pain  [] Patient limited by other medical complications  [] Other:     Prognosis:  [x] Good [] Fair  [] Poor    Patient Requires Follow-up: [x] Yes  [] No    PLAN: 2x per week for 6 weeks  Electronically signed by: Sunita Villa PT  DPT, OCS, OMT-C

## 2022-07-20 ENCOUNTER — OFFICE VISIT (OUTPATIENT)
Dept: SURGERY | Age: 32
End: 2022-07-20
Payer: COMMERCIAL

## 2022-07-20 ENCOUNTER — TELEPHONE (OUTPATIENT)
Dept: SURGERY | Age: 32
End: 2022-07-20

## 2022-07-20 VITALS
TEMPERATURE: 97.9 F | OXYGEN SATURATION: 98 % | HEART RATE: 88 BPM | SYSTOLIC BLOOD PRESSURE: 111 MMHG | WEIGHT: 188 LBS | HEIGHT: 68 IN | BODY MASS INDEX: 28.49 KG/M2 | DIASTOLIC BLOOD PRESSURE: 78 MMHG

## 2022-07-20 DIAGNOSIS — D12.6 ADENOMATOUS POLYP OF COLON, UNSPECIFIED PART OF COLON: ICD-10-CM

## 2022-07-20 DIAGNOSIS — K60.2 ANAL FISSURE: Primary | ICD-10-CM

## 2022-07-20 PROCEDURE — 99204 OFFICE O/P NEW MOD 45 MIN: CPT | Performed by: SURGERY

## 2022-07-20 NOTE — TELEPHONE ENCOUNTER
Prescription called in to [facility] [phone], for nifedipine 0.3%, lidocaine 5%. Instructed to use BID. 2 refills.

## 2022-07-20 NOTE — PROGRESS NOTES
[facility] COLORECTAL SURGERY  [address]  Dept: [phone]  Dept Fax: [phone]  Loc: [phone]    Visit Date: 7/20/2022    Ava Amezcua is a 32 y.o. female who presents today for: New Patient (Anal fissure )      HPI:       Ava Amezcua is a 32 y.o. female referred by Eleni Benz for anorectal discomfort. Patient has had 1 year of anorectal pain and discomfort. Symptoms occur all the time, after BMs. Bleeding: yes  Constipation: no  Patient has tried: Rx ointment from out of state? Previous similar symptoms: no  Previous anorectal surgeries: no    Denies fever, chills, abd pain, nausea, emesis, weight loss. Patient's problem list, medications, past medical, surgical, family, and social histories were reviewed and updated in the chart as indicated today.     Past Medical History:   Diagnosis Date    Anxiety     NO MEDS    Arthritis     Asthma     exercise induced    GERD (gastroesophageal reflux disease)     H/O migraine     History of gestational diabetes     IBS (irritable bowel syndrome)     Non-alcoholic fatty liver disease     PONV (postoperative nausea and vomiting)     WANTS ANTI NAUSEA PATCH PRE-OP       Past Surgical History:   Procedure Laterality Date    ANKLE FRACTURE SURGERY Left 2/14/2022    OPEN REDUCTION INTERNAL FIXATION LEFT ANKLE WITH SYNDESMOSIS SCREW FIXATION performed by Magdy Fish MD at [facility] Left 4/25/2022    REMOVAL SYNDESMOTIC SCREW, REMOVAL MEDIAL SCREW LEFT ANKLE performed by Magdy Fish MD at [place] [address] Right     HYSTERECTOMY (CERVIX STATUS UNKNOWN)      SHOULDER SURGERY Left     ligament repair-X4 SHOULDER SURGERIES       Family History: Family history of colorectal cancer/polyps: no    Social History:   Social History     Tobacco Use    Smoking status: Former     Packs/day: 0.50     Years: 18.00     Pack years: 9.00     Types: E-Cigarettes, Cigarettes     Quit date: 2018     Years since quittin.2    Smokeless tobacco: Never    Tobacco comments:     quit cigarettes 4 years ago uses e cigarettes now   Substance Use Topics    Alcohol use: Yes     Comment: rare      Tobacco cessation counseling provided as appropriate. REVIEW OF SYSTEMS:    Pertinent positives and negatives are mentioned in the HPI above. Otherwise, all other systems were reviewed and negative. Objective:     Physical Exam   /78   Pulse 88   Temp 97.9 °F (36.6 °C) (Infrared)   Ht 5' 8\" (1.727 m)   Wt 188 lb (85.3 kg)   SpO2 98%   BMI 28.59 kg/m²   Constitutional: Appears well-developed and well-nourished. Grooming appropriate. No gross deformities. Body mass index is 28.59 kg/m². Eyes: No scleral icterus. Conjunctiva/lids normal. Vision intact grossly. Pupils equal/symmetric, reactive bilaterally. ENT: External ears/nose without defect, scars, or masses. Hearing grossly intact. No facial deformity. Lips normal, normal dentition. Neck: No masses. Trachea midline. No crepitus. Thyroid not enlarged. Cardiovascular: Normal rate. No peripheral edema. Abdominal aorta normal size to palpation. Pulmonary/Chest: Effort normal. No respiratory distress. No wheezes. No use of accessory muscles. Musculoskeletal: Normal range of motion x all 4 extremities and head/neck, without deformity, pain, or crepitus, with normal strength and tone. Normal gait. Nails without clubbing or cyanosis. Neurological: Alert and oriented to person, place, and time. No gross deficits. Sensation intact. Skin: Skin is dry. No rashes noted. No pallor. No induration of nodules. Psychiatric: Normal mood and affect. Behavior normal. Oriented to person, place, and time. Judgment and insight reasonable.     Abdominal/wound: soft, nontender    RECTAL EXAM:    Chaperone/MA present in room during entire exam. Patient was placed in lateral or knee-chest positioning depending on comfort. Exam table manipulated for proper visualization and lighting. Buttocks spread. Inspection reveals: posterior  Midline fissure confirmed by cotton tip swab palpation    Digital exam and anoscopy deferred due to acute pain      Labs: none  Radiology: none    Last colonoscopy: at age 21 - (+) polyps      Assessment/Plan:     A/P:  New problem(s): Anal fissure  Established problem(s): history colon polyps  Additional workup/treatment planned: Medical therapy with prescription calcium channel blocker ointment, fiber supplementation, sitz baths, improving dietary and lifestyle choices  Risk of complications/morbidity: moderate    Patient has signs and symptoms consistent with anal fissure. 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 We will start with conservative management, including fiber supplementation, water, healthy fruits and vegetables, and medical management with prescription topical calcium channel blocker ointment. Discussed the use of the prescription ointment and that it will need to be obtained from a compounding pharmacy, which my office will arrange. If symptoms do not improve in 6-8 weeks, patient may require more invasive treatment options, such as Botox injection, partial sphincterotomy, or fissurectomy with anocutaneous advancement flap. We briefly discussed operative risks of these various options. Patient understands the need for full anorectal exam in the future after resolution of symptoms (or colonoscopy depending on other risk factors for colon cancer). I provided written information in the After Visit Summary AVS Regarding: Anal fissure    DISPOSITION:  F/u in 6 weeks for symptom reassessment    My findings will be relayed to consulting practitioner or PCP via Epic    Note completed using dictation software, please excuse any errors.     Electronically signed by Shemar Galdamez MD on 7/20/2022 at 1:36 PM

## 2022-07-20 NOTE — Clinical Note
We will do a brief trial of medical management for her anal fissure and I will see her back in 6 weeks for reassessment. Thanks!  [facility]

## 2022-07-20 NOTE — PATIENT INSTRUCTIONS
Anal Fissure: Information and Care Instructions        An anal fissure is a tear in the lining of the anus. It typically causes intense, sharp pain and a small amount of bleeding. You may notice bright red blood on the toilet paper after you wipe. A fissure may form if you are constipated and try to pass a large, hard stool, or have excessive diarrhea. Some fissures form despite regular, soft stools. Some are due to trauma. Rarely, fissures can be a sign of other diseases such as Crohn's disease, infections, or cancer. In 50% of patients, anal fissure will heal by addressing the underlying problem and avoiding additional hard stool and constipation. See below for recommendations. In the other 50% of patients, anal fissures are resistant to healing due to spasm (abnormal tightening) of the internal sphincter muscle. This part of the anal muscles is under automatic control, so you may not feel the spasm. Most treatments attempt to break the cycle of spasm and pain by relaxing the internal sphincter muscle. This can be done with medication, Botox injection, and occasionally with surgery. Anal fissures themselves do not lead to cancer or other serious illnesses, however undiagnosed rectal bleeding can be a sign of other problems in the colon and rectum, such as hemorrhoids, infections, polyps, or even cancers. If bleeding is excessive, mixed with stool, or ongoing after healing of the fissure, or you have a family history of cancer, colonoscopy may be recommended. How to treat constipation and avoid straining:  Avoid constipation:  Include fruits, vegetables, beans, and whole grains in your diet each day. These foods are high in fiber. Take a fiber supplement, such as Benefiber, Citrucel, or Metamucil, every day. Read and follow all instructions on the label. Drink plenty of fluids, enough so that your urine is light yellow or clear like water.  If you have kidney, heart, or liver disease and have to limit fluids, talk with your doctor before you increase the amount of fluids you drink. Miralax or colace can be helpful to take as needed for constipation. Read and follow all instructions on the label. Use the toilet when only when you feel the urge. Do not strain when having a bowel movement. Do not sit on the toilet too long, play games on your cell phone, or read while on the commode. Get some exercise every day. Build up slowly to 30 to 60 minutes a day on 5 or more days of the week. Support your feet with a small step stool when you sit on the toilet. This helps flex your hips and places your pelvis in a squatting position. Most over-the-counter ointments and creams are not helpful, and can even cause damage to the skin  Use baby wipes instead of toilet paper to clean after a bowel movement. These products do not irritate the anus. Sit in a few inches of warm water (sitz bath) 3 times a day and after bowel movements. The warm water helps ease discomfort. Do not put soaps, salts, or shampoos in the water. Be sure to follow up in the office as instructed  Typically you will have a follow up appointment scheduled in 4-6 weeks to reassess your symptoms. If not, please call the office to schedule this. You may need to be seen sooner if you have fever, chills, excessive bleeding, increasing pain, inability to urinate, or changes in appetite. Please call the office at [phone] with any questions or concerns  If it is outside of normal hours and you have any concerns, please go to your nearest emergency room. What other options are available to treat fissures if I continue to have symptoms:  Special ointments can be prescribed to help relax the muscle spasm. Typically, these need to be compounded (mixed) at a special pharmacy. A pea-sized amount should be used around (not inside) the anus twice daily.   Botox injections of the sphincter can be performed, which will provide spasm relief for 1-2

## 2022-07-21 ENCOUNTER — HOSPITAL ENCOUNTER (OUTPATIENT)
Dept: PHYSICAL THERAPY | Age: 32
Setting detail: THERAPIES SERIES
Discharge: HOME | End: 2022-07-21
Payer: COMMERCIAL

## 2022-07-21 PROCEDURE — 97112 NEUROMUSCULAR REEDUCATION: CPT

## 2022-07-21 PROCEDURE — 97530 THERAPEUTIC ACTIVITIES: CPT

## 2022-07-21 NOTE — FLOWSHEET NOTE
Arjun Mendoza  Phone: [phone]   Fax: [phone]     Physical Therapy Discharge Summary    Dear Musa Vu MD  ,    We had the pleasure of treating the following patient for physical therapy services at [facility]. A summary of our findings can be found in the discharge summary below. If you have any questions or concerns regarding these findings, please do not hesitate to contact me at the office phone number checked above. Thank you for the referral.     Physician Signature:________________________________Date:__________________  By signing above (or electronic signature), therapists plan is approved by physician      Functional Outcome: FOTO - 20 at eval compared to 59 at d/c. Sub-sections:   ;   ;         Overall Response to Treatment:   [x]Patient is responding well to treatment and improvement is noted with regards  to goals   []Patient should continue to improve in reasonable time if they continue HEP   []Patient has plateaued and is no longer responding to skilled PT intervention    []Patient is getting worse and would benefit from return to referring MD   []Patient unable to adhere to initial POC   [x]Other:  Pt has now met all goals and has normalized ROM and strength. Pt function has improved significantly as shown by her foto score above at eval and d/c. Pt is expected to continue to improve with her HEP. Pt to follow up with MD/ PT as needed. Date range of Visits: 3/28/22-22    Recommendation:    [x] Discharge to [facility]. Follow up with PT or MD PRN.               Physical Therapy Treatment Note/ Progress Report:     Date:  2022    Patient Name:  Mandi Cobian    :  1990  MRN: [id number]  Restrictions/Precautions:    Medical/Treatment Diagnosis Information:  Diagnosis: Z98.890, Z87.81 (ICD-10-CM) - S/P ORIF (open reduction internal fixation) [id number] (ICD-10-CM) - Closed trimalleolar fracture of left ankle with routine healing, subsequent encounter  Treatment Diagnosis: decreased ROM, strength, proprioception, balance impairments, reduced motor control, reduced participation in ADLs/IADLs, reduced tolerance for weight-bearing activities  Insurance/Certification information:  PT Insurance Information: [address]  Physician Information:  Referring Practitioner: Julio Brownlee MD  Plan of care signed (Y/N): [x]  Yes []  No     Date of Patient follow up with Physician:       Progress Report: []  Yes  [x]  No     Date Range for reporting period:  Beginning: 3/28  Endin/21    Progress report due (10 Rx/or 30 days whichever is less):     Recertification due (POC duration/ or 90 days whichever is less):                                             Visit # Gap session Auth required? Date Range                        []  Yes  []  No  [facility]  new insurance starting         Latex Allergy:  [x]NO      []YES  Preferred Language for Healthcare:   [x]English       []other:    Functional Scale:           Date assessed:  FOTO physical FS primary measure score = 20; risk adjusted = 47  3/28  FOTO physical FS primary measure score = 53                                                     FOTO: 57            FOTO - 59                                                                                                                     Pain level:  0- /10      SUBJECTIVE: Pt states she has no pain currently, feeling good and ready to D/C to HEP.      OBJECTIVE:      PROM AROM     L R L R   Dorsiflexion     15 degrees from neutral 5   Plantarflexion      50 60   Inversion      20 35   Eversion      12 20     5/4 incision closed medially and laterally but needs daily STM w/ Vit E.   MMT:                     LT                         RT   DF                        22.5 (08383) Provided manual therapy to mobilize LE, proximal hip and/or LS spine soft tissue/joints for the purpose of modulating pain, promoting relaxation,  increasing ROM, reducing/eliminating soft tissue swelling/inflammation/restriction, improving soft tissue extensibility and allowing for proper ROM for normal function with self care, mobility, lifting and ambulation. Modalities:  [] (07172) Vasopneumatic compression: Utilized vasopneumatic compression to decrease edema / swelling for the purpose of improving mobility and quad tone / recruitment which will allow for increased overall function including but not limited to self-care, transfers, ambulation, and ascending / descending stairs. Charges:  Timed Code Treatment Minutes: 43   Total Treatment Minutes: 43     [] EVAL - LOW (40923)   [] EVAL - MOD (23219)  [] EVAL - HIGH (50024)  [] RE-EVAL (16671)  [] WB(38766) x  1     [] Ionto  [x] NMR (60603) x 1         [] Vaso  [] Manual (80907) x   1    [] Ultrasound  [x] TA x  2     [] Mech Traction (01421)  [] Aquatic Therapy x      [] ES (un) (09496):   [] Home Management Training x  [] ES(attended) (40660)   [] Dry Needling 1-2 muscles (54479):  [] Dry Needling 3+ muscles (042926  [] GAP Group: 5/19 5/11, 5/4   [] Other:      GOALS:  Patient stated goal: return to stair climbing, walking and playing with kids. [] Progressing: [x] Met: [] Not Met: [] Adjusted    Therapist goals for Patient:   Short Term Goals: To be achieved in: 2 weeks  1. Independent in HEP and progression per patient tolerance, in order to prevent re-injury. [] Progressing: [x] Met: [] Not Met: [] Adjusted  2. Patient will have a decrease in pain to facilitate improvement in movement, function, and ADLs as indicated by Functional Deficits. [] Progressing: [x] Met: [] Not Met: [] Adjusted    Long Term Goals: To be achieved in: 12 weeks  1. FOTO score of at least 60 to assist with reaching prior level of function.    [] Progressing: [] Met: [x] Not Met: [] Adjusted  2. Patient will demonstrate increased L ankle dorsiflexion AROM to 0 degrees  to allow for proper joint functioning and ability negotiate stairs safely. GOAL MET; NEW GOAL: pt will demonstrate CKC ankle DF to greater than or equal to 15 degrees to facilitate improved mechanics for ambulating down stairs. [] Progressing: [x] Met: [] Not Met: [] Adjusted  3. Patient will demonstrate an increase in Strength to at least 4+/5 as well as good proximal hip strength and control to allow for proper functional mobility and safe ascent/descent of stairs. [] Progressing: [x] Met: [] Not Met: [] Adjusted  4. Patient will return to functional activities including playing with her children without increased symptoms or restriction. 7/21: occasional sharp pain    [] Progressing: [] Met: [x] Not Met: [] Adjusted  5. Pt will be able to maintain appropriate balance in L LE SLS for 15 seconds with no use of UE support to demonstrate safety in navigating stairs and other functional activities. 7/21: SLS 15 SECONDS  [] Progressing: [x] Met: [] Not Met: [] Adjusted         Overall Progression Towards Functional goals/ Treatment Progress Update:  [x] Patient is progressing as expected towards functional goals listed. [] Progression is slowed due to complexities/Impairments listed. [] Progression has been slowed due to co-morbidities.   [] Plan just implemented, too soon to assess goals progression <30days   [] Goals require adjustment due to lack of progress  [] Patient is not progressing as expected and requires additional follow up with physician  [] Other    Persisting Functional Limitations/Impairments:  []Sitting [x]Standing   [x]Walking [x]Stairs   [x]Transfers [x]ADLs   [x]Squatting/bending []Kneeling  [x]Housework []Job related tasks  [x]Driving [x]Sports/Recreation   [x]Sleeping []Other:    ASSESSMENT:    Treatment/Activity Tolerance:  [x] Pt able to complete treatment [] Patient limited by rosa elena  [] Patient limited by pain  [] Patient limited by other medical complications  [] Other:     Prognosis:  [x] Good [] Fair  [] Poor    Patient Requires Follow-up: [] Yes  [x] No    PLAN: 2x per week for 6 weeks  Electronically signed by: Kade Hines, PT  DPT,  OMT-C

## 2022-09-13 ENCOUNTER — TELEMEDICINE (OUTPATIENT)
Dept: PRIMARY CARE CLINIC | Age: 32
End: 2022-09-13
Payer: COMMERCIAL

## 2022-09-13 DIAGNOSIS — J01.00 ACUTE MAXILLARY SINUSITIS, RECURRENCE NOT SPECIFIED: Primary | ICD-10-CM

## 2022-09-13 PROCEDURE — 99213 OFFICE O/P EST LOW 20 MIN: CPT | Performed by: NURSE PRACTITIONER

## 2022-09-13 RX ORDER — AMOXICILLIN AND CLAVULANATE POTASSIUM 875; 125 MG/1; MG/1
1 TABLET, FILM COATED ORAL 2 TIMES DAILY
Qty: 20 TABLET | Refills: 0 | Status: SHIPPED | OUTPATIENT
Start: 2022-09-13 | End: 2022-09-23

## 2022-09-13 RX ORDER — FLUTICASONE PROPIONATE 50 MCG
2 SPRAY, SUSPENSION (ML) NASAL DAILY
Qty: 16 G | Refills: 0 | Status: SHIPPED | OUTPATIENT
Start: 2022-09-13

## 2022-09-13 ASSESSMENT — ENCOUNTER SYMPTOMS
DIARRHEA: 0
SHORTNESS OF BREATH: 0
SINUS PAIN: 1
VOMITING: 0
RHINORRHEA: 1
COUGH: 0
SINUS PRESSURE: 1
NAUSEA: 0

## 2022-09-13 NOTE — PROGRESS NOTES
2022    TELEHEALTH EVALUATION -- Audio/Visual (During - public health emergency)    HPI:    Jacquelyn Licona (:  1990) has requested an audio/video evaluation for the following concern(s):    Sinusitis: Patient complains of congestion, facial pain, headache described as sinus headace, nasal congestion, post nasal drip, purulent nasal discharge, and sinus pressure ear fullness, nasal drainage is thick green-clear. Denies f/n/v/d. Onset of symptoms was about a 1.5 weeks ago rapidly worsening since that time. She is drinking plenty of fluids. Other medications have included oral decongestants, Isabella pot and saline rinse, DayQuil NyQuil with no avail. She  Past history is significant for  sinusitis last infection a year ago . Patient is former smoker, quit 4 years ago. Pt reports home covid is negative. Review of Systems   Constitutional:  Positive for activity change. Negative for appetite change, chills and fever. HENT:  Positive for congestion, ear pain, postnasal drip, rhinorrhea, sinus pressure and sinus pain. Respiratory:  Negative for cough and shortness of breath. Gastrointestinal:  Negative for diarrhea, nausea and vomiting. Skin:  Negative for rash. Neurological:  Positive for headaches. Prior to Visit Medications    Medication Sig Taking?  Authorizing Provider   fluticasone (FLONASE) 50 MCG/ACT nasal spray 2 sprays by Each Nostril route daily Yes DONNELL Berrios CNP   amoxicillin-clavulanate (AUGMENTIN) 875-125 MG per tablet Take 1 tablet by mouth 2 times daily for 10 days Yes DONNLEL Berrios CNP       Social History     Tobacco Use    Smoking status: Former     Packs/day: 0.50     Years: 18.00     Pack years: 9.00     Types: E-Cigarettes, Cigarettes     Quit date: 2018     Years since quittin.4    Smokeless tobacco: Never    Tobacco comments:     quit cigarettes 4 years ago uses e cigarettes now   Vaping Use    Vaping Use: Every day Substances: Nicotine, Flavoring    Devices: Disposable   Substance Use Topics    Alcohol use: Yes     Comment: rare    Drug use: Never        Allergies   Allergen Reactions    Hydrocodone Nausea And Vomiting     CAN TAKE WITH ANTI-NAUSEA MEDICATION    Oxycodone Nausea And Vomiting     Can take with anti-nausea medication   ,   Past Medical History:   Diagnosis Date    Anxiety     NO MEDS    Arthritis     Asthma     exercise induced    GERD (gastroesophageal reflux disease)     H/O migraine     History of gestational diabetes     IBS (irritable bowel syndrome)     Non-alcoholic fatty liver disease     PONV (postoperative nausea and vomiting)     WANTS ANTI NAUSEA PATCH PRE-OP       PHYSICAL EXAMINATION:  [ INSTRUCTIONS:  \"[x]\" Indicates a positive item  \"[]\" Indicates a negative item  -- DELETE ALL ITEMS NOT EXAMINED]  Vital Signs: (As obtained by patient/caregiver or practitioner observation)    Blood pressure-  Heart rate-    Respiratory rate-    Temperature-  Pulse oximetry-     Constitutional: [x] Appears well-developed and well-nourished [] No apparent distress      [] Abnormal-   Mental status  [x] Alert and awake  [x] Oriented to person/place/time [x]Able to follow commands      Eyes:  EOM    [x]  Normal  [] Abnormal-  Sclera  [x]  Normal  [] Abnormal -         Discharge [x]  None visible  [] Abnormal -    HENT:   [x] Normocephalic, atraumatic.   [] Abnormal   [x] Mouth/Throat: Mucous membranes are moist.     External Ears [x] Normal  [] Abnormal-     Neck: [x] No visualized mass     Pulmonary/Chest: [x] Respiratory effort normal.  [x] No visualized signs of difficulty breathing or respiratory distress        [] Abnormal-      Musculoskeletal:   [] Normal gait with no signs of ataxia         [] Normal range of motion of neck        [] Abnormal-       Neurological:        [] No Facial Asymmetry (Cranial nerve 7 motor function) (limited exam to video visit)          [] No gaze palsy        [] Abnormal- Skin:        [x] No significant exanthematous lesions or discoloration noted on facial skin         [] Abnormal-            Psychiatric:       [x] Normal Affect [] No Hallucinations        [] Abnormal-     Other pertinent observable physical exam findings-     ASSESSMENT/PLAN:    1. Acute maxillary sinusitis, recurrence not specified  - fluticasone (FLONASE) 50 MCG/ACT nasal spray; 2 sprays by Each Nostril route daily  Dispense: 16 g; Refill: 0  - amoxicillin-clavulanate (AUGMENTIN) 875-125 MG per tablet; Take 1 tablet by mouth 2 times daily for 10 days  Dispense: 20 tablet; Refill: 0    Home Care Instructions  Notify office if you do not improve or have worsening of condition. Take medication as prescribed  Take antibiotic medication until ALL GONE  Drink plenty of fluids and rest  Do not eat or drink after anyone  Do not share utensils  Practice good hand hygiene  May sleep with humidifier as needed  May take tylenol/Ibuprofen (alternate as needed for fever/pain)  After day 3 change out toothbrush to a new one  Cover your mouth and nose when you cough or sneeze  Sore throat: gargle with warm salt water 3-4 times a day, you can use ice, warm chicken noodle soup, soft foods, popsicles, cough drops  Cough- suggest that airway irritation contributing to cough be relieved with oral hydration, warm fluids (eg, tea, chicken soup), honey (in children older than one year), or cough lozenges or hard candy (in children in whom they are not an aspiration risk) rather than OTC or prescription antitussives, antihistamines, expectorants, or mucolytics       Return if symptoms worsen or fail to improve. Ian Edmonds, was evaluated through a synchronous (real-time) audio-video encounter. The patient (or guardian if applicable) is aware that this is a billable service, which includes applicable co-pays. This Virtual Visit was conducted with patient's (and/or legal guardian's) consent.  The visit was conducted pursuant to the emergency declaration under the [facility], [facility] authority and the [employer] General Act. Patient identification was verified, and a caregiver was present when appropriate. The patient was located at Home: [place] [address]. Provider was located at [facility] (Appt Dept): [address] [facility],  [address] [place] [address]. Total time spent on this encounter:  20 min    --DONNELL Leon CNP on 9/13/2022 at 2:44 PM    An electronic signature was used to authenticate this note.

## 2022-09-20 ENCOUNTER — OFFICE VISIT (OUTPATIENT)
Dept: SURGERY | Age: 32
End: 2022-09-20
Payer: COMMERCIAL

## 2022-09-20 ENCOUNTER — TELEPHONE (OUTPATIENT)
Dept: SURGERY | Age: 32
End: 2022-09-20

## 2022-09-20 VITALS
TEMPERATURE: 98.2 F | WEIGHT: 189 LBS | SYSTOLIC BLOOD PRESSURE: 124 MMHG | HEART RATE: 92 BPM | HEIGHT: 68 IN | DIASTOLIC BLOOD PRESSURE: 81 MMHG | BODY MASS INDEX: 28.64 KG/M2 | OXYGEN SATURATION: 98 %

## 2022-09-20 DIAGNOSIS — K60.2 ANAL FISSURE: Primary | ICD-10-CM

## 2022-09-20 PROCEDURE — 99214 OFFICE O/P EST MOD 30 MIN: CPT | Performed by: SURGERY

## 2022-09-20 RX ORDER — SODIUM CHLORIDE 0.9 % (FLUSH) 0.9 %
5-40 SYRINGE (ML) INJECTION EVERY 12 HOURS SCHEDULED
OUTPATIENT
Start: 2022-09-20

## 2022-09-20 RX ORDER — SODIUM CHLORIDE 9 MG/ML
INJECTION, SOLUTION INTRAVENOUS PRN
OUTPATIENT
Start: 2022-09-20

## 2022-09-20 RX ORDER — SODIUM CHLORIDE 0.9 % (FLUSH) 0.9 %
5-40 SYRINGE (ML) INJECTION PRN
OUTPATIENT
Start: 2022-09-20

## 2022-09-20 NOTE — TELEPHONE ENCOUNTER
Patient has been scheduled for:    Procedure: EUA, Fissurectomy, Botox   Date: 10/17/22  Time: TBD  Arrival: TBD  Hospital: [facility]     Covid:  ASA?: N/A  Prep? Npo, fleets at bedtime    Pre-op? N/A    Post-op Appt? 11/8/22 at 1:30pm     Patient advised they will need a . Orders routed to surgery scheduling. Instructions have been mailed/emailed to:  [email or web address]

## 2022-09-20 NOTE — LETTER
[facility] - Surgeons [address] [phone]  f [phone]    Jennifer Mitchell MD                        SURGERY ORDER   -- Time of order -- 22    3:08 PM    Facility:   [facility] # _________________                                                                                    Scheduled By:____________                  Surgery Date & Time: 10/17/22 at TBD                                       Pt arrival: [facility]                                                                                      Patient Name:  Coleen Abrams     :  1990     PCP:  Adam Tobias 2[address] Ph:    [phone] (home)                                                     PROCEDURE:  Exam under anesthesia, fissurectomy, botox injection anal fissure 07849, 24103, 78244    DIAGNOSIS:      ICD-10-CM    1. Anal fissure  K60.2           Anesthesia: _General    Time Needed:  15 minutes    Pt Position:  prone/rolan-knife    Bowel Prep: fleets enema         Outpatient _x__ Admit ___                  Pre-Op to be done by: _N/A____    Cardiac Clearance Done by: __N/A______    Medications to be stopped 5 days before surgery: ____none_____    Additional / Special Orders: schedule on flip day                                                                                                                                                                                                     Jennifer Mitchell MD  Insurance:                                ID #                                        Ph #     (secondary ?)       Date called ______        [facility] to: _____ @ _____           Precert Needed?  Yes  /  No    PreAuth # & Details _______________________________________________________                        ______ Albina Larger to Yefri 2 Verification _[phone]_      Post Op_________ ____Inst given                 _____ Elis Lloyder to Pt/Spouse

## 2022-09-20 NOTE — PROGRESS NOTES
[facility]  [address]  Dept: [phone]  Dept Fax: [phone]  Loc: [phone]    Visit Date: 9/20/2022    Preeti Cleary is a 32 y.o. female who presents today for: Follow-up (Anal fissure )      HPI:       Preeti Cleary is a 32 y.o. female known to me for chronic anal fissure. Unfortunately, continues to have anal pain. She has been using calcium channel blocker ointment 2-3 times daily. She is interested in surgical evaluation.     Past Medical History:   Diagnosis Date    Anxiety     NO MEDS    Arthritis     Asthma     exercise induced    GERD (gastroesophageal reflux disease)     H/O migraine     History of gestational diabetes     IBS (irritable bowel syndrome)     Non-alcoholic fatty liver disease     PONV (postoperative nausea and vomiting)     WANTS ANTI NAUSEA PATCH PRE-OP     Past Surgical History:   Procedure Laterality Date    ANKLE FRACTURE SURGERY Left 2/14/2022    OPEN REDUCTION INTERNAL FIXATION LEFT ANKLE WITH SYNDESMOSIS SCREW FIXATION performed by Darshan Burger MD at [facility] Left 4/25/2022    REMOVAL SYNDESMOTIC SCREW, REMOVAL MEDIAL SCREW LEFT ANKLE performed by Darshan Burger MD at [place] [address] Right     HYSTERECTOMY (CERVIX STATUS UNKNOWN)      SHOULDER SURGERY Left     ligament repair-X4 SHOULDER SURGERIES       Current Outpatient Medications:     amoxicillin-clavulanate (AUGMENTIN) 875-125 MG per tablet, Take 1 tablet by mouth 2 times daily for 10 days, Disp: 20 tablet, Rfl: 0    fluticasone (FLONASE) 50 MCG/ACT nasal spray, 2 sprays by Each Nostril route daily (Patient not taking: Reported on 9/20/2022), Disp: 16 g, Rfl: 0  Allergies   Allergen Reactions    Hydrocodone Nausea And Vomiting     CAN TAKE WITH ANTI-NAUSEA MEDICATION    Oxycodone Nausea And Vomiting     Can take with anti-nausea medication     Past Surgical History:   Procedure Laterality Date    ANKLE FRACTURE SURGERY Left 2022    OPEN REDUCTION INTERNAL FIXATION LEFT ANKLE WITH SYNDESMOSIS SCREW FIXATION performed by Sisi Beckett MD at [facility] Left 2022    REMOVAL SYNDESMOTIC SCREW, REMOVAL MEDIAL SCREW LEFT ANKLE [facility] Right     HYSTERECTOMY (CERVIX STATUS UNKNOWN)      SHOULDER SURGERY Left     ligament repair-X4 SHOULDER SURGERIES     Family History   Problem Relation Age of Onset    High Blood Pressure Mother     Kidney Disease Father         kidney stones       Social History:   Social History     Tobacco Use    Smoking status: Former     Packs/day: 0.50     Years: 18.00     Pack years: 9.00     Types: E-Cigarettes, Cigarettes     Quit date: 2018     Years since quittin.4    Smokeless tobacco: Never    Tobacco comments:     quit cigarettes 4 years ago uses e cigarettes now   Substance Use Topics    Alcohol use: Yes     Comment: rare      Tobacco cessation counseling provided as appropriate. REVIEW OF SYSTEMS:    Pertinent positives and negatives are mentioned in the HPI. Otherwise, all other systems were reviewed and negative. Objective:     Physical Exam   /81   Pulse 92   Temp 98.2 °F (36.8 °C) (Infrared)   Ht 5' 8\" (1.727 m)   Wt 189 lb (85.7 kg)   SpO2 98%   BMI 28.74 kg/m²   Constitutional: Appears well-developed and well-nourished. Grooming appropriate. No gross deformities. Body mass index is 28.74 kg/m². Eyes: No scleral icterus. Conjunctiva/lids normal. Vision intact grossly. Pupils equal/symmetric, reactive bilaterally. ENT: External ears/nose without defect, scars, or masses. Hearing grossly intact. No facial deformity. Lips normal, normal dentition. Neck: No masses. Trachea midline. No crepitus. Thyroid not enlarged. Cardiovascular: Normal rate. No peripheral edema.  Abdominal aorta normal size to palpation. Pulmonary/Chest: Effort normal. No respiratory distress. No wheezes. No use of accessory muscles. Musculoskeletal: Normal range of motion of head/neck, without deformity, pain, or crepitus, with normal strength and tone. Normal gait. Nails without clubbing or cyanosis. Neurological: Alert and oriented to person, place, and time. No gross deficits. Sensation intact. Skin: Skin is dry. No rashes noted. No pallor. No induration of nodules. Psychiatric: Normal mood and affect. Behavior normal. Oriented to person, place, and time. Judgment and insight reasonable. Abdominal/wound: Soft, nontender, nondistended    Anorectal Exam: Chaperone present in room throughout exam.  Patient placed in the left lateral position. Buttocks spread. Digital rectal exam performed with lubricated finger. Anoscopy performed. Findings reveal: Continued tenderness posterior midline consistent with anal fissure    Labs reviewed: none     Imaging reviewed: none    Assessment/Plan:       A/P:  Established problem(s): Chronic anal fissure  Additional workup/treatment planned: EUA, Botox/fissurectomy  Risk of complications/morbidity: Moderate    Unfortunately, Julianne Smith has had minimal response to calcium channel blocker prescription ointment. I offered continuing the ointment and increasing to 3 times daily versus surgical intervention. She is interested in surgery. I discussed Botox injection with fissurectomy versus partial lateral internal sphincterotomy. She is leaning towards Botox, which I think is perfectly reasonable given the low risk of incontinence, but high risk of recurrence. We will plan surgery in the coming weeks. Continue with current prescription medications for now    I had a discussion with the patient regarding the risks, benefits, and alternatives of the procedure.  Risks include, but are not limited to: bleeding, infection, missed lesions, need for additional procedures or operations, thrombosis of external hemorrhoids, urinary retention, pelvic sepsis, and sphincter stretch or damage, which could result in temporary or rarely permanent incontinence to stool or flatus. Anesthesia options discussed. Postoperative pain discussed, and time away from work or usual daily activities discussed. All questions answered to patient's satisfaction. DISPOSITION:  surgery at her convenience    My findings will be relayed to consulting practitioner or PCP via Epic note    Note completed using dictation software, please excuse any errors.     Electronically signed by Jamia Ramos MD on 9/20/2022 at 3:06 PM

## 2022-09-20 NOTE — Clinical Note
Will schedule anal fissure surgery.  Thanks, [employer] 89y/o male with PMHx of recurrent UTIs, R occipital CVA w/ subsequent L sided weakness and L visual field defect, T2DM admitted with pseudomonas UTI.     -Continue to treat UTI per ID recs  -Keep landeros for maximal drainage until UTI has been treated and cleared  -Likely void trial as an outpatient  -Continue landeros change q2wks     D/w Dr. Ewing    89y/o male with PMHx of recurrent UTIs, R occipital CVA w/ subsequent L sided weakness and L visual field defect, T2DM admitted with pseudomonas UTI and found to have paraphimosis on exam     -Continue to treat UTI per ID recs  -Keep landeros for maximal drainage until UTI has been treated and cleared  -Likely void trial as an outpatient  -Continue landeros change q2wks   -Discussed importance of good hygiene and always replacing foreskin     D/w Dr. Ewing

## 2022-10-12 NOTE — PROGRESS NOTES
Place patient label inside box (if no patient label, complete below)  Name:  :  MR#:   Dylon Crawley / [address]  I (we), Palmira Gonsalves (Patient Name) authorize Garrett Mantilla MD (Provider / [employer]) and/or such assistants as may be selected by him/her, to perform the following operation/procedure(s): EXAM UNDER ANESTHESIA, FISSURECTOMY, BOTOX INJECTION ANAL FISSURE       Note: If unable to obtain consent prior to an emergent procedure, document the emergent reason in the medical record. This procedure has been explained to my (our) satisfaction and included in the explanation was: The intended benefit, nature, and extent of the procedure to be performed; The significant risks involved and the probability of success; Alternative procedures and methods of treatment; The dangers and probable consequences of such alternatives (including no procedure or treatment); The expected consequences of the procedure on my future health; Whether other qualified individuals would be performing important surgical tasks and/or whether  would be present to advise or support the procedure. I (we) understand that there are other risks of infection and other serious complications in the pre-operative/procedural and postoperative/procedural stages of my (our) care. I (we) have asked all of the questions which I (we) thought were important in deciding whether or not to undergo treatment or diagnosis. These questions have been answered to my (our) satisfaction. I (we) understand that no assurance can be given that the procedure will be a success, and no guarantee or warranty of success has been given to me (us). It has been explained to me (us) that during the course of the operation/procedure, unforeseen conditions may be revealed that necessitate extension of the original procedure(s) or different procedure(s) than those set forth in Paragraph 1. I (we) authorize and request that the above-named physician, his/her assistants or his/her designees, perform procedures as necessary and desirable if deemed to be in my (our) best interest.     Revised 8/2/2021                                                                          Page 1 of 2             I acknowledge that health care personnel may be observing this procedure for the purpose of medical education or other specified purposes as may be necessary as requested and/or approved by my (our) physician. I (we) consent to the disposal by the hospital Pathologist of the removed tissue, parts or organs in accordance with hospital policy. I do ____ do not ____ consent to the use of a local infiltration pain blocking agent that will be used by my provider/surgical provider to help alleviate pain during my procedure. I do ____ do not ____ consent to an emergent blood transfusion in the case of a life-threatening situation that requires blood components to be administered. This consent is valid for 24 hours from the beginning of the procedure. This patient does ____ or does not ____ currently have a DNR status/order. If DNR order is in place, obtain Addendum to the Surgical Consent for ALL Patients with a DNR Order to address kartik-operative status for limited intervention or DNR suspension.      I have read and fully understand the above Consent for Operation/Procedure and that all blanks were completed before I signed the consent.   _____________________________       _____________________      ____/____am/pm  Signature of Patient or legal representative      Printed Name / Relationship            Date / Time   ____________________________       _____________________      ____/____am/pm  Witness to Signature                                    Printed Name                    Date / Time    If patient is unable to sign or is a minor, complete the following)  Patient is a minor, ____ years of age, or unable to sign because:   ______________________________________________________________________________________________    If a phone consent is obtained, consent will be documented by using two health care professionals, each affirming that the consenting party has no questions and gives consent for the procedure discussed with the physician/provider.   _____________________          ____________________       _____/_____am/pm   2nd witness to phone consent        Printed name           Date / Time    Informed Consent:  I have provided the explanation described above in section 1 to the patient and/or legal representative.  I have provided the patient and/or legal representative with an opportunity to ask any questions about the proposed operation/procedure.   ___________________________          ____________________         ____/____am/pm  Provider / Proceduralist                            Printed name            Date / Time  Revised 8/2/2021                                                                      Page 2 of 2

## 2022-10-12 NOTE — PROGRESS NOTES
instructions to follow. If you did not receive these, call your surgeon's office immediately. 5. MEDICATIONS:  Take the following medications with a SMALL sip of water: none  Use your usual dose of inhalers the morning of surgery. BRING your rescue inhaler with you to hospital.   Anesthesia does NOT want you to take insulin the morning of surgery. They will control your blood sugar while you are at the hospital. Please contact your ordering physician for instructions regarding your insulin the night before your procedure. If you have an insulin pump, please keep it set on basal rate. Bariatric patient's call your surgeon if on diabetic medications as some may need to be stopped 1 week prior to surgery    6. Do not swallow additional water when brushing teeth. No gum, candy, mints, or ice chips. Refrain from smoking or at least decrease the amount on day of surgery. 7. Morning of surgery:   Take a shower with an antibacterial soap (i.e., Safeguard or Dial) OR your physician may have instructed you to use Hibiclens. Dress in loose, comfortable clothing appropriate for redressing after your procedure. Do not wear jewelry (including body piercings), make-up (especially NO eye make-up), fingernail polish (NO toenail polish if foot/leg surgery), lotion, powders, or metal hairclips. Do not shave or wax for 72 hours prior to procedure near your operative site. Shaving with a razor can irritate your skin and make it easier to develop an infection. On the day of your procedure, any hair that needs to be removed near the surgical site will be 'clipped' by a healthcare worker using a special clipper designed to avoid skin irritation. 8. Dentures, glasses, or contacts will need to be removed before your procedure. Bring cases for your glasses, contacts, dentures, or hearing aids to protect them while you are in surgery. 9. If you use a CPAP, please bring it with you on the day of your procedure.     10. We anesthesia. Your nurse will help you manage these potential side effects. 2. For comfort and safety, arrange to have someone at home with you for the first 24 hours after discharge. 3. You and your family will be given written instructions about your diet, activity, dressing care, medications, and return visits. 4. Once at home, should issues with nausea, pain, or bleeding occur, or should you notice any signs of infection, you should call your surgeon. 5. Always clean your hands before and after caring for your wound. Do not let your family touch your surgery site without cleaning their hands. 6. Narcotic pain medications can cause significant constipation. You may want to add a stool softener to your postoperative medication schedule or speak to your surgeon on how best to manage this SIDE EFFECT. SPECIAL INSTRUCTIONS None    Thank you for allowing us to care for you. We strive to exceed your expectations in the delivery of care and service provided to you and your family. If you need to contact the [place] [address] staff for any reason, please call us at [phone]    Instructions reviewed with patient during preadmission testing phone interview.   Dominic Lynn RN.10/12/2022 .5:15 PM      ADDITIONAL EDUCATIONAL INFORMATION REVIEWED PER PHONE WITH YOU AND/OR YOUR FAMILY:  No Hibiclens® Bathing Instructions   Yes Antibacterial Soap

## 2022-10-14 ENCOUNTER — TELEPHONE (OUTPATIENT)
Dept: SURGERY | Age: 32
End: 2022-10-14

## 2022-10-14 ENCOUNTER — ANESTHESIA EVENT (OUTPATIENT)
Dept: OPERATING ROOM | Age: 32
End: 2022-10-14
Payer: COMMERCIAL

## 2022-10-14 RX ORDER — SCOLOPAMINE TRANSDERMAL SYSTEM 1 MG/1
1 PATCH, EXTENDED RELEASE TRANSDERMAL ONCE
Status: DISCONTINUED | OUTPATIENT
Start: 2022-10-17 | End: 2022-10-17 | Stop reason: HOSPADM

## 2022-10-14 NOTE — TELEPHONE ENCOUNTER
I have placed a reminder call to patient for upcoming procedure. Did you speak directly to patient or leave a voicemail? Voicemail    Prep? NPO 12AM  Fleets enema      Must have a  that is over the age of 25. Must be a friend or family member that can be responsible for signing them out after surgery.     Arrive at the main entrance [facility] at 6:45AM

## 2022-10-14 NOTE — ANESTHESIA PRE PROCEDURE
Department of Anesthesiology  Preprocedure Note       Name:  Jefe Turcios   Age:  32 y.o.  :  1990                                          MRN:  [id number]         Date:  10/14/2022      Surgeon: Leonardo Berry):  Constantino Castillo MD    Procedure: Procedure(s):  EXAM UNDER ANESTHESIA,  FISSURECTOMY, BOTOX INJECTION ANAL FISSURE    Medications prior to admission:   Prior to Admission medications    Medication Sig Start Date End Date Taking? Authorizing Provider   fluticasone (FLONASE) 50 MCG/ACT nasal spray 2 sprays by Each Nostril route daily  Patient not taking: Reported on 2022   DONNELL Stack - CNP       Current medications:    No current facility-administered medications for this encounter. Current Outpatient Medications   Medication Sig Dispense Refill    fluticasone (FLONASE) 50 MCG/ACT nasal spray 2 sprays by Each Nostril route daily (Patient not taking: Reported on 2022) 16 g 0       Allergies: Allergies   Allergen Reactions    Hydrocodone Nausea And Vomiting     CAN TAKE WITH ANTI-NAUSEA MEDICATION    Oxycodone Nausea And Vomiting     Can take with anti-nausea medication       Problem List:  There is no problem list on file for this patient.       Past Medical History:        Diagnosis Date    Anxiety     NO MEDS    Arthritis     Asthma     exercise induced    GERD (gastroesophageal reflux disease)     H/O migraine     History of gestational diabetes     IBS (irritable bowel syndrome)     Non-alcoholic fatty liver disease     PONV (postoperative nausea and vomiting)     WANTS ANTI NAUSEA PATCH PRE-OP       Past Surgical History:        Procedure Laterality Date    ANKLE FRACTURE SURGERY Left 2022    OPEN REDUCTION INTERNAL FIXATION LEFT ANKLE WITH SYNDESMOSIS SCREW FIXATION performed by Lincoln Batres MD at [address] [place] [address] Left 2022    REMOVAL SYNDESMOTIC SCREW, REMOVAL MEDIAL SCREW LEFT ANKLE performed by Lincoln Batres MD at [address]     HYSTERECTOMY (CERVIX STATUS UNKNOWN)      SHOULDER SURGERY Left     ligament repair-X4 SHOULDER SURGERIES       Social History:    Social History     Tobacco Use    Smoking status: Former     Packs/day: 0.50     Years: 18.00     Pack years: 9.00     Types: E-Cigarettes, Cigarettes     Quit date: 2018     Years since quittin.4    Smokeless tobacco: Never    Tobacco comments:     quit cigarettes 4 years ago uses e cigarettes now   Substance Use Topics    Alcohol use: Yes     Comment: rare                                Counseling given: Not Answered  Tobacco comments: quit cigarettes 4 years ago uses e cigarettes now      Vital Signs (Current):   Vitals:    10/12/22 1712   Weight: 189 lb (85.7 kg)                                              BP Readings from Last 3 Encounters:   22 124/81   22 111/78   22 (!) 89/53       NPO Status:                                                                                 BMI:   Wt Readings from Last 3 Encounters:   10/12/22 189 lb (85.7 kg)   22 189 lb (85.7 kg)   22 188 lb (85.3 kg)     Body mass index is 28.74 kg/m². CBC: No results found for: WBC, RBC, HGB, HCT, MCV, RDW, PLT    CMP: No results found for: NA, K, CL, CO2, BUN, CREATININE, GFRAA, AGRATIO, LABGLOM, GLUCOSE, GLU, PROT, CALCIUM, BILITOT, ALKPHOS, AST, ALT    POC Tests: No results for input(s): POCGLU, POCNA, POCK, POCCL, POCBUN, POCHEMO, POCHCT in the last 72 hours.     Coags: No results found for: PROTIME, INR, APTT    HCG (If Applicable): No results found for: PREGTESTUR, PREGSERUM, HCG, HCGQUANT     ABGs: No results found for: PHART, PO2ART, QLQ9CIJ, VMA1AZP, BEART, B0VPKYBT     Type & Screen (If Applicable):  No results found for: LABABO, LABRH    Drug/Infectious Status (If Applicable):  No results found for: HIV, HEPCAB    COVID-19 Screening (If Applicable):   Lab Results   Component Value Date/Time    COVID19 Not Detected 02/14/2022 07:54 AM           Anesthesia Evaluation  Patient summary reviewed and Nursing notes reviewed   history of anesthetic complications: PONV. Airway: Mallampati: II  TM distance: >3 FB   Neck ROM: full  Mouth opening: > = 3 FB   Dental: normal exam         Pulmonary:normal exam    (+) asthma:                            Cardiovascular:Negative CV ROS            Rhythm: regular  Rate: normal                    Neuro/Psych:   (+) headaches: migraine headaches, depression/anxiety             GI/Hepatic/Renal:   (+) GERD:,           Endo/Other: Negative Endo/Other ROS                    Abdominal:             Vascular: negative vascular ROS. Other Findings:           Anesthesia Plan      general     ASA 2       Induction: intravenous. Anesthetic plan and risks discussed with patient. Plan discussed with CRNA.                     Tara Troncoso MD   10/14/2022

## 2022-10-17 ENCOUNTER — ANESTHESIA (OUTPATIENT)
Dept: OPERATING ROOM | Age: 32
End: 2022-10-17
Payer: COMMERCIAL

## 2022-10-17 ENCOUNTER — HOSPITAL ENCOUNTER (OUTPATIENT)
Age: 32
Setting detail: OUTPATIENT SURGERY
Discharge: HOME | End: 2022-10-17
Attending: SURGERY | Admitting: SURGERY
Payer: COMMERCIAL

## 2022-10-17 VITALS
TEMPERATURE: 97.1 F | SYSTOLIC BLOOD PRESSURE: 122 MMHG | OXYGEN SATURATION: 99 % | RESPIRATION RATE: 16 BRPM | HEIGHT: 68 IN | DIASTOLIC BLOOD PRESSURE: 79 MMHG | HEART RATE: 68 BPM | BODY MASS INDEX: 28.64 KG/M2 | WEIGHT: 189 LBS

## 2022-10-17 DIAGNOSIS — G89.18 POST-OP PAIN: Primary | ICD-10-CM

## 2022-10-17 DIAGNOSIS — K60.2 ANAL FISSURE: ICD-10-CM

## 2022-10-17 LAB
GLUCOSE BLD-MCNC: 96 MG/DL (ref 70–99)
PERFORMED ON: NORMAL

## 2022-10-17 PROCEDURE — 7100000000 HC PACU RECOVERY - FIRST 15 MIN: Performed by: SURGERY

## 2022-10-17 PROCEDURE — 46200 REMOVAL OF ANAL FISSURE: CPT | Performed by: SURGERY

## 2022-10-17 PROCEDURE — 6370000000 HC RX 637 (ALT 250 FOR IP): Performed by: ANESTHESIOLOGY

## 2022-10-17 PROCEDURE — C9290 INJ, BUPIVACAINE LIPOSOME: HCPCS | Performed by: SURGERY

## 2022-10-17 PROCEDURE — 3600000013 HC SURGERY LEVEL 3 ADDTL 15MIN: Performed by: SURGERY

## 2022-10-17 PROCEDURE — 2580000003 HC RX 258: Performed by: ANESTHESIOLOGY

## 2022-10-17 PROCEDURE — 3700000001 HC ADD 15 MINUTES (ANESTHESIA): Performed by: SURGERY

## 2022-10-17 PROCEDURE — 7100000010 HC PHASE II RECOVERY - FIRST 15 MIN: Performed by: SURGERY

## 2022-10-17 PROCEDURE — 6360000002 HC RX W HCPCS: Performed by: NURSE ANESTHETIST, CERTIFIED REGISTERED

## 2022-10-17 PROCEDURE — 88304 TISSUE EXAM BY PATHOLOGIST: CPT

## 2022-10-17 PROCEDURE — 3600000003 HC SURGERY LEVEL 3 BASE: Performed by: SURGERY

## 2022-10-17 PROCEDURE — 46505 CHEMODENERVATION ANAL MUSC: CPT | Performed by: SURGERY

## 2022-10-17 PROCEDURE — 46250 REMOVE EXT HEM GROUPS 2+: CPT | Performed by: SURGERY

## 2022-10-17 PROCEDURE — 2709999900 HC NON-CHARGEABLE SUPPLY: Performed by: SURGERY

## 2022-10-17 PROCEDURE — 3700000000 HC ANESTHESIA ATTENDED CARE: Performed by: SURGERY

## 2022-10-17 PROCEDURE — 7100000001 HC PACU RECOVERY - ADDTL 15 MIN: Performed by: SURGERY

## 2022-10-17 PROCEDURE — 2500000003 HC RX 250 WO HCPCS: Performed by: NURSE ANESTHETIST, CERTIFIED REGISTERED

## 2022-10-17 PROCEDURE — 6360000002 HC RX W HCPCS: Performed by: SURGERY

## 2022-10-17 PROCEDURE — 7100000011 HC PHASE II RECOVERY - ADDTL 15 MIN: Performed by: SURGERY

## 2022-10-17 RX ORDER — ONDANSETRON 4 MG/1
4 TABLET, ORALLY DISINTEGRATING ORAL EVERY 8 HOURS PRN
Qty: 20 TABLET | Refills: 0 | Status: SHIPPED | OUTPATIENT
Start: 2022-10-17

## 2022-10-17 RX ORDER — DIPHENHYDRAMINE HYDROCHLORIDE 50 MG/ML
12.5 INJECTION INTRAMUSCULAR; INTRAVENOUS
Status: DISCONTINUED | OUTPATIENT
Start: 2022-10-17 | End: 2022-10-17 | Stop reason: HOSPADM

## 2022-10-17 RX ORDER — OXYCODONE HYDROCHLORIDE 5 MG/1
5 TABLET ORAL EVERY 6 HOURS PRN
Qty: 20 TABLET | Refills: 0 | Status: SHIPPED | OUTPATIENT
Start: 2022-10-17 | End: 2022-10-22

## 2022-10-17 RX ORDER — SODIUM CHLORIDE 0.9 % (FLUSH) 0.9 %
5-40 SYRINGE (ML) INJECTION EVERY 12 HOURS SCHEDULED
Status: DISCONTINUED | OUTPATIENT
Start: 2022-10-17 | End: 2022-10-17 | Stop reason: HOSPADM

## 2022-10-17 RX ORDER — ROCURONIUM BROMIDE 10 MG/ML
INJECTION, SOLUTION INTRAVENOUS PRN
Status: DISCONTINUED | OUTPATIENT
Start: 2022-10-17 | End: 2022-10-17 | Stop reason: SDUPTHER

## 2022-10-17 RX ORDER — SODIUM CHLORIDE 0.9 % (FLUSH) 0.9 %
5-40 SYRINGE (ML) INJECTION PRN
Status: DISCONTINUED | OUTPATIENT
Start: 2022-10-17 | End: 2022-10-17 | Stop reason: HOSPADM

## 2022-10-17 RX ORDER — ONDANSETRON 2 MG/ML
4 INJECTION INTRAMUSCULAR; INTRAVENOUS
Status: DISCONTINUED | OUTPATIENT
Start: 2022-10-17 | End: 2022-10-17 | Stop reason: HOSPADM

## 2022-10-17 RX ORDER — GLYCOPYRROLATE 0.2 MG/ML
INJECTION INTRAMUSCULAR; INTRAVENOUS PRN
Status: DISCONTINUED | OUTPATIENT
Start: 2022-10-17 | End: 2022-10-17 | Stop reason: SDUPTHER

## 2022-10-17 RX ORDER — LIDOCAINE HYDROCHLORIDE 20 MG/ML
INJECTION, SOLUTION INTRAVENOUS PRN
Status: DISCONTINUED | OUTPATIENT
Start: 2022-10-17 | End: 2022-10-17 | Stop reason: SDUPTHER

## 2022-10-17 RX ORDER — MIDAZOLAM HYDROCHLORIDE 1 MG/ML
INJECTION INTRAMUSCULAR; INTRAVENOUS PRN
Status: DISCONTINUED | OUTPATIENT
Start: 2022-10-17 | End: 2022-10-17 | Stop reason: SDUPTHER

## 2022-10-17 RX ORDER — LORAZEPAM 2 MG/ML
0.5 INJECTION INTRAMUSCULAR
Status: DISCONTINUED | OUTPATIENT
Start: 2022-10-17 | End: 2022-10-17 | Stop reason: HOSPADM

## 2022-10-17 RX ORDER — PROCHLORPERAZINE EDISYLATE 5 MG/ML
5 INJECTION INTRAMUSCULAR; INTRAVENOUS
Status: DISCONTINUED | OUTPATIENT
Start: 2022-10-17 | End: 2022-10-17 | Stop reason: HOSPADM

## 2022-10-17 RX ORDER — PROPOFOL 10 MG/ML
INJECTION, EMULSION INTRAVENOUS PRN
Status: DISCONTINUED | OUTPATIENT
Start: 2022-10-17 | End: 2022-10-17 | Stop reason: SDUPTHER

## 2022-10-17 RX ORDER — ONDANSETRON 2 MG/ML
INJECTION INTRAMUSCULAR; INTRAVENOUS PRN
Status: DISCONTINUED | OUTPATIENT
Start: 2022-10-17 | End: 2022-10-17 | Stop reason: SDUPTHER

## 2022-10-17 RX ORDER — SODIUM CHLORIDE 9 MG/ML
INJECTION, SOLUTION INTRAVENOUS PRN
Status: DISCONTINUED | OUTPATIENT
Start: 2022-10-17 | End: 2022-10-17 | Stop reason: HOSPADM

## 2022-10-17 RX ORDER — SODIUM CHLORIDE, SODIUM LACTATE, POTASSIUM CHLORIDE, CALCIUM CHLORIDE 600; 310; 30; 20 MG/100ML; MG/100ML; MG/100ML; MG/100ML
INJECTION, SOLUTION INTRAVENOUS CONTINUOUS
Status: DISCONTINUED | OUTPATIENT
Start: 2022-10-17 | End: 2022-10-17 | Stop reason: HOSPADM

## 2022-10-17 RX ORDER — DOCUSATE SODIUM 100 MG/1
100 CAPSULE, LIQUID FILLED ORAL 2 TIMES DAILY PRN
Qty: 30 CAPSULE | Refills: 0 | Status: SHIPPED | OUTPATIENT
Start: 2022-10-17 | End: 2022-11-01

## 2022-10-17 RX ORDER — LABETALOL HYDROCHLORIDE 5 MG/ML
10 INJECTION, SOLUTION INTRAVENOUS
Status: DISCONTINUED | OUTPATIENT
Start: 2022-10-17 | End: 2022-10-17 | Stop reason: HOSPADM

## 2022-10-17 RX ORDER — MEPERIDINE HYDROCHLORIDE 25 MG/ML
12.5 INJECTION INTRAMUSCULAR; INTRAVENOUS; SUBCUTANEOUS EVERY 5 MIN PRN
Status: DISCONTINUED | OUTPATIENT
Start: 2022-10-17 | End: 2022-10-17 | Stop reason: HOSPADM

## 2022-10-17 RX ORDER — FENTANYL CITRATE 50 UG/ML
INJECTION, SOLUTION INTRAMUSCULAR; INTRAVENOUS PRN
Status: DISCONTINUED | OUTPATIENT
Start: 2022-10-17 | End: 2022-10-17 | Stop reason: SDUPTHER

## 2022-10-17 RX ORDER — DEXAMETHASONE SODIUM PHOSPHATE 4 MG/ML
INJECTION, SOLUTION INTRA-ARTICULAR; INTRALESIONAL; INTRAMUSCULAR; INTRAVENOUS; SOFT TISSUE PRN
Status: DISCONTINUED | OUTPATIENT
Start: 2022-10-17 | End: 2022-10-17 | Stop reason: SDUPTHER

## 2022-10-17 RX ADMIN — FENTANYL CITRATE 50 MCG: 50 INJECTION, SOLUTION INTRAMUSCULAR; INTRAVENOUS at 09:29

## 2022-10-17 RX ADMIN — DEXAMETHASONE SODIUM PHOSPHATE 4 MG: 4 INJECTION, SOLUTION INTRAMUSCULAR; INTRAVENOUS at 09:47

## 2022-10-17 RX ADMIN — SUGAMMADEX 200 MG: 100 INJECTION, SOLUTION INTRAVENOUS at 09:55

## 2022-10-17 RX ADMIN — SODIUM CHLORIDE, POTASSIUM CHLORIDE, SODIUM LACTATE AND CALCIUM CHLORIDE: 600; 310; 30; 20 INJECTION, SOLUTION INTRAVENOUS at 10:02

## 2022-10-17 RX ADMIN — ONDANSETRON 4 MG: 2 INJECTION INTRAMUSCULAR; INTRAVENOUS at 09:48

## 2022-10-17 RX ADMIN — GLYCOPYRROLATE 0.2 MG: 0.2 INJECTION INTRAMUSCULAR; INTRAVENOUS at 09:29

## 2022-10-17 RX ADMIN — LIDOCAINE HYDROCHLORIDE 50 MG: 20 INJECTION, SOLUTION INTRAVENOUS at 09:30

## 2022-10-17 RX ADMIN — ROCURONIUM BROMIDE 40 MG: 10 INJECTION INTRAVENOUS at 09:32

## 2022-10-17 RX ADMIN — SODIUM CHLORIDE, POTASSIUM CHLORIDE, SODIUM LACTATE AND CALCIUM CHLORIDE: 600; 310; 30; 20 INJECTION, SOLUTION INTRAVENOUS at 08:21

## 2022-10-17 RX ADMIN — FENTANYL CITRATE 50 MCG: 50 INJECTION, SOLUTION INTRAMUSCULAR; INTRAVENOUS at 09:46

## 2022-10-17 RX ADMIN — PROPOFOL 150 MG: 10 INJECTION, EMULSION INTRAVENOUS at 09:30

## 2022-10-17 RX ADMIN — MIDAZOLAM HYDROCHLORIDE 2 MG: 2 INJECTION, SOLUTION INTRAMUSCULAR; INTRAVENOUS at 09:25

## 2022-10-17 ASSESSMENT — PAIN SCALES - GENERAL
PAINLEVEL_OUTOF10: 0
PAINLEVEL_OUTOF10: 1
PAINLEVEL_OUTOF10: 0

## 2022-10-17 NOTE — H&P
[facility]    [id number]    [facility] Same Day Surgery Update H & P  Department of General Surgery   Surgical Service   Pre-operative History and Physical  Last H & P within the last 30 days. DIAGNOSIS:   Anal fissure [K60.2]    Procedure(s):  EXAM UNDER ANESTHESIA,  FISSURECTOMY, BOTOX INJECTION ANAL FISSURE    History obtained from: Patient interview and EHR      HISTORY OF PRESENT ILLNESS:   The patient is a 32 y.o. female with c/ pain and drainage in the setting of anal fissure presents today for the above procedure. Illness Screening: Patient denies fever, chills, worsening cough, or close contact with sick individuals. Past Medical History:        Diagnosis Date    Anxiety     NO MEDS    Arthritis     Asthma     exercise induced    GERD (gastroesophageal reflux disease)     H/O migraine     History of gestational diabetes     IBS (irritable bowel syndrome)     Non-alcoholic fatty liver disease     PONV (postoperative nausea and vomiting)     WANTS ANTI NAUSEA PATCH PRE-OP     Past Surgical History:        Procedure Laterality Date    ANKLE FRACTURE SURGERY Left 2/14/2022    OPEN REDUCTION INTERNAL FIXATION LEFT ANKLE WITH SYNDESMOSIS SCREW FIXATION performed by Emry Apley, MD at [address] [place] [address] Left 4/25/2022    REMOVAL SYNDESMOTIC SCREW, REMOVAL MEDIAL SCREW LEFT ANKLE performed by Emry Apley, MD at [place] [address] Right     HYSTERECTOMY (CERVIX STATUS UNKNOWN)      SHOULDER SURGERY Left     ligament repair-X4 SHOULDER SURGERIES       Medications Prior to Admission:      Prior to Admission medications    Medication Sig Start Date End Date Taking?  Authorizing Provider   fluticasone (FLONASE) 50 MCG/ACT nasal spray 2 sprays by Each Nostril route daily  Patient not taking: Reported on 9/20/2022 9/13/22   DONNELL Garcia - CNP         Allergies:  Hydrocodone and Oxycodone    PHYSICAL EXAM:      /81   Pulse 79   Temp 97.4 °F (36.3 °C) (Temporal)   Resp 15   Ht 5' 8\" (1.727 m)   Wt 189 lb (85.7 kg)   SpO2 98%   BMI 28.74 kg/m²      Airway:  Airway patent with no audible stridor    Heart:  Regular rate and rhythm, No murmur noted    Lungs:  No increased work of breathing, good air exchange, clear to auscultation bilaterally, no crackles or wheezing    Abdomen:  Soft, non-distended, non-tender, no rebound tenderness or guarding, and no masses palpated    ASSESSMENT AND PLAN     Patient is a 32 y.o. female with above specified procedure planned. 1.  The patients history and physical was obtained and signed off by the pre-admission testing department. Patient seen and focused exam done today- no new changes since last physical exam on 9/20/22    2. Access to ancillary services are available per request of the provider.     Griselda Pion, APRN - CNP     10/17/2022

## 2022-10-17 NOTE — PROGRESS NOTES
PACU Transfer to [facility]    Vitals:    10/17/22 1055   BP: 118/80   Pulse: 78   Resp: 18   Temp: 97.2 °F (36.2 °C)   SpO2: 99%         Intake/Output Summary (Last 24 hours) at 10/17/2022 1056  Last data filed at 10/17/2022 1053  Gross per 24 hour   Intake 1325 ml   Output --   Net 1325 ml       Pain assessment:    Pain Level: 1    Patient transferred to care of [facility] RN.    10/17/2022 10:56 AM

## 2022-10-17 NOTE — DISCHARGE INSTRUCTIONS
DR Nickolas Isabel POST-OP INSTRUCTIONS AFTER ANORECTAL SURGERY    1) Keep stools soft and avoid straining:  Eat 25-30 grams of fiber per day (use a fiber supplement such as Benefiber, Konsyl,  Metamucil, or store brand)  Drink plenty of water (4-6 glasses per day)  MiraLax as needed  Colace stool softeners as needed  Eat lots of fruits and vegetables and walk for at least 45 min per day. 2) Pain can be controlled with a combination of:  Tylenol - you may take up to 3000 mg per day (in the absence of liver disease)  NSAID medications such as motrin, ibuprofen, or advil (only if approved by your primary care physician)   Narcotic medications if needed, such as Percocet, Oxycodone, Lortab, Norco, etc.   Be careful, because narcotic medications can impair you and make you severely constipated, which can aggravate your wounds and incisions. No driving or operating machinery if taking narcotics, or if you cannot safely maneuver the vehicle without pain. 3) You should expect to have pain and discomfort for 2-3 weeks depending on what operation was done. You may also have some drainage of mucus and a small amount of bleeding. It is recommended to take 1-2 weeks off from work, depending on your comfort and specifics of your job. Please call the office if you need a note from the doctor. 4) Do sitz baths (warm soaks) for 5 min twice daily and after bowel movements, or use the stream from a shower head to gently cleanse your bottom. 5) You do not have any lifting restrictions, though you may find that certain movements and positions will cause increased pain. Walking is encouraged, and you may climb stairs. 6) If not already scheduled, please call the office the day after surgery to set up a post op appointment for approximately 3 weeks after surgery. If any tissue was sent for pathology, this will be discussed at your post-op visit, or you may call the office after 1 week for obtaining results sooner.     If you have any questions or concerns, please call the office during regular hours (M-F 8:30am-5:00pm). After hours, you may call the surgeon on call or come to the nearest ER. You should watch for excessive bleeding, fever, chills, nausea, vomiting, severe increase in pain, inability to urinate, or foul smelling or painful drainage. Dr Gwen Cast office number: [phone]   Your Surgeon or Anesthesiologist gave you Exparel     Exparel (bupivicaine liposome injectable suspension) is a medication injected into the surgical incision  just before the end of the procedure by your surgeon to help control your pain after surgery. It can also be given as a nerve block by the anesthesiologist. This local anesthetic provides pain relief by numbing the tissue around the surgical site. Exparel releases pain medication over time lasting up to 5 days or 120 hours. Exparel may cause a temporary loss of sensation or the ability to move in the area where the medication was injected. Side effects of Exparel that may occur include nausea, vomiting or constipation. Call immediately if you experience numbness or tingling of the mouth or lips, lightheadedness or severe anxiety. Notify your physician if you experience these or any other possible side effects of the medication. Note: Other forms of bupivacaine should not be administered within 96 hours (4 days) following administration of Exparel. Please keep ID band in place for 96 hours (4 days). [facility]    There are potential side effects of anesthesia or sedation you may experience for the first 24 hours. These side effects include:    Confusion or Memory loss, Dizziness, or Delayed Reaction Times   [x]A responsible person should be with you for the next 24 hours. Do not operate any vehicles (automobiles, bicycles, motorcycles) or power tools or machinery for 24 hours.   Do not sign any legal documents or make any legal decisions for 24 hours. Do not drink alcohol for 24 hours or while taking narcotic pain medication. Nausea    [x]Start with light diet and progress to your normal diet as you feel like eating. However, if you experience nausea or repeated episodes of vomiting which persist beyond 12-24 hours, notify your physician. Once nausea has passed, remember to keep drinking fluids. Difficulty Passing Urine  [x]Drink extra amounts of fluid today. Notify your physician if you have not urinated within 8 hours after your procedure or you feel uncomfortable. Irritated Throat from a Breathing Tube  [x]Drink extra amounts of fluid today. Lozenges may help. Muscle Aches  [x]You may experience some generalized body aches as your muscles recover from medications used to relax them during surgery. These will gradually subside. MEDICATION INSTRUCTIONS:  [x]Prescription(S) x    3 sent with you. Use as directed. When taking pain medications, you may experience the side effect of dizziness or drowsiness. Do not drink alcohol or drive when taking these medications. []Prescription(S) x          Called to Pharmacy Name and location:    [x]Give the list of your medications to your primary care physician on your next visit. Keep your med list updated and carry it with in case of emergencies. [x] Narcotic pain medications can cause the side effect of significant constipation. You may want to add a stool softener to your postoperative medication schedule or speak to your surgeon on how best to manage this side effect. NARCOTIC SAFETY:  Your pain medicine is only for you to take. Safely store your medicines. Store pills up high and out of reach of children and pets. Ensure safety caps are snapped tightly  Keep track of how many pills you have left    Unused medication can be disposed of by taking them to a drop-off box or take-back program that is authorized by the [facility].   Access to a site near you can be found on the [facility] ([employer] website ([address] [facility] [place][note 2]. [facility]). If you have a CPAP machine, it is very important that you use it daily during all periods of sleep and daytime rest during your recovery at home. Surgery and Anesthesia place a significant amount of stress on your body. Using your CPAP will help keep you safe and lessen the negative effects of that stress. FOLLOW-UP RECOVERY CARE:  [x]Call the office at [phone]  for follow-up appointment  3 weeks and problems    Watch for these possible complications, symptoms, or side effects of anesthesia. Call physician if they or any other problems occur:  Signs of INFECTION   > Fever over 101°     > Redness, swelling, hardness or warmth at the operative site   >Foul smelling or cloudy drainage at the operative site   Unrelieved PAIN  Unrelieved NAUSEA  Blood soaked dressing. (Some oozing may be normal)  Inability to urinate      Numb, pale, blue, cold or tingling extremity      Physician:  dr. Ej Garcia    The above instructions were reviewed with patient/significant other. The following additional patient specific information was reviewed with the patient/significant other:  [x]Procedure/physician specific instructions  [x]Medication information sheet(S) including potential side effects  []Bambis egress test  []Pain Ball management  []FAQ Catheter associated blood stream infections  []FAQ Surgical Site Infections  []Other-    I have read and understand the instructions given to me: ____________________________________________   (Patient/S.O. Signature)            Date/time 10/17/2022 10:34 AM         PACU:  [phone]   M-F 700 AM - 7 PM      SAME DAY SERVICES:  [phone] M-F 7AM-6PM        If you smoke STOP. We care about your health!

## 2022-10-17 NOTE — PROGRESS NOTES
Pt arrived form OR, s/p EXAM UNDER ANESTHESIA,  FISSURECTOMY, BOTOX INJECTION ANAL FISSURE AND THROMBUS HEMORRHOIDECTOMY, report received from CRNA and Dr. Amanda Keys, pt shivering on arrival, denies pain at this time  General

## 2022-10-17 NOTE — ANESTHESIA POSTPROCEDURE EVALUATION
Department of Anesthesiology  Postprocedure Note    Patient: Patricia Lynn  MRN: [id number]  YOB: 1990  Date of evaluation: 10/17/2022      Procedure Summary     Date: 10/17/22 Room / Location: [address] [place] [address]    Anesthesia Start: 1664 Anesthesia Stop: 1016    Procedure: EXAM UNDER ANESTHESIA,  FISSURECTOMY, BOTOX INJECTION ANAL FISSURE AND THROMBUS HEMORRIODECTOMY Diagnosis:       Anal fissure      (Anal fissure [K60.2])    Surgeons: Bard Bobby MD Responsible Provider: Abhinav Benitez MD    Anesthesia Type: General ASA Status: 2          Anesthesia Type: General    Ubaldo Phase I: Ubaldo Score: 10    Ubaldo Phase II:        Anesthesia Post Evaluation    Patient location during evaluation: PACU  Patient participation: complete - patient participated  Level of consciousness: awake  Airway patency: patent  Nausea & Vomiting: no nausea and no vomiting  Complications: no  Cardiovascular status: hemodynamically stable  Respiratory status: acceptable  Hydration status: euvolemic

## 2022-10-17 NOTE — PROGRESS NOTES
Ambulatory Surgery/Procedure Discharge Note  Pt alert and oriented  Verbal and written discharge instructions given to pt and family  Sitz bath with supplies and instructions given to pt for discharge use  3 rxs given to pt upon discharge   Colace , zofran and pain pill  Voided very large amount in toilet  Up ambulating well  Dcd  stable to car   No pain  No nausea at time of discharge    Vitals:    10/17/22 1112   BP: 122/79   Pulse: 68   Resp: 16   Temp: 97.1 °F (36.2 °C)   SpO2: 99%       In: 1425 [P.O.:160; I.V.:1265]  Out: -     Restroom use offered before discharge. Yes    Pain assessment:  level of pain (1-10, 10 severe),   Pain Level: 1        Patient discharged to home/self care.  Patient discharged via wheel chair by transporter to waiting family/S.O.       10/17/2022 12:02 PM

## 2022-10-17 NOTE — BRIEF OP NOTE
Brief Postoperative Note      Patient: Armando Ware  YOB: 1990  MRN: [id number]    Date of Procedure: 10/17/2022    Pre-Op Diagnosis: Anal fissure [K60.2]    Post-Op Diagnosis: Same       Procedure(s):  EXAM UNDER ANESTHESIA,  FISSURECTOMY, BOTOX INJECTION ANAL FISSURE    Surgeon(s):  David Foster MD    Assistant:  Resident: Ashley Petersen MD    Anesthesia: General    Estimated Blood Loss (mL): Minimal    Complications: None    Specimens:   ID Type Source Tests Collected by Time Destination   A : ANAL FISSURE Tissue Tissue SURGICAL PATHOLOGY David Foster MD 10/17/2022 8332    B : EXTERNAL HEMORRHOID Tissue Tissue SURGICAL PATHOLOGY David Foster MD 10/17/2022 7099        Implants:  * No implants in log *      Drains: * No LDAs found *    Findings: Posterior fissurectomy, removal of thrombosed external hemorrhoid, botox injection.       Electronically signed by Ashley Petersen MD on 10/17/2022 at 10:01 AM

## 2022-10-17 NOTE — OP NOTE
OPERATIVE NOTE     Sangeeta Kenney  1990  [id number]    DATE OF PROCEDURE: 10/17/22     PREOPERATIVE DIAGNOSES: Chronic anal fissure     POSTOPERATIVE DIAGNOSES: Chronic anal fissure, thrombosed external hemorrhoids x 2     PROCEDURE:   1. Chemodenervation of internal sphincter with botulinum toxin for anal fissure  2. Fissurectomy  3. Excision thrombosed hemorrhoids x 2     SURGEON: Carrie Brooks MD    ASSISTANT: JAZMINE, PGY-1, resident     ANESTHESIA: GET     COMPLICATIONS: None. EBL: 5 cc    SPECIMEN: Rectal biopsy/anal fissure     FINDINGS:  Posterior midline chronic anal fissure posterior with hypertrophic sentinel skin tag. INDICATIONS: The patient is a 70-year-old female who has had symptoms of chronic anal fissure unresponsive to medical management. Patient was recommended to undergo botox denervation of internal sphincter, fissurectomy, possible biopsy, flexible sigmoidoscopy, and other associated procedures as needed. The risks, benefits, and alternatives of procedure were explained to the patient including risks of bleeding, infection, pelvic sepsis, urinary retention, anal stenosis, recurrent fissure, and potential  sphincter damage and dysfunction, which can result in temporary or rarely permanent incontinence. Patient understood all of these risks and agreed to  proceed. Typical postoperative course was discussed, including pain and likely need for up to 3 weeks of recovery. PROCEDURE DETAILS:   The patient was brought to the operating theater. The patient was placed in the prone jack-knife position after induction of general endotracheal anesthesia. Buttocks were taped apart carefully using tape. The patient's perianal region was prepped and draped in usual sterile fashion using Betadine prep solution. Time-out was performed confirming the patient's identity as well as the operative site. Antibiotics were not indicated. SCDs were on and functioning.  All safety points were

## 2022-11-22 ENCOUNTER — OFFICE VISIT (OUTPATIENT)
Dept: SURGERY | Age: 32
End: 2022-11-22

## 2022-11-22 VITALS
WEIGHT: 189 LBS | SYSTOLIC BLOOD PRESSURE: 126 MMHG | HEIGHT: 68 IN | BODY MASS INDEX: 28.64 KG/M2 | HEART RATE: 90 BPM | DIASTOLIC BLOOD PRESSURE: 83 MMHG | TEMPERATURE: 98.4 F | RESPIRATION RATE: 16 BRPM | OXYGEN SATURATION: 98 %

## 2022-11-22 DIAGNOSIS — D12.6 ADENOMATOUS POLYP OF COLON, UNSPECIFIED PART OF COLON: ICD-10-CM

## 2022-11-22 DIAGNOSIS — K60.2 ANAL FISSURE: Primary | ICD-10-CM

## 2022-11-22 PROCEDURE — 99024 POSTOP FOLLOW-UP VISIT: CPT | Performed by: SURGERY

## 2022-12-21 DIAGNOSIS — J02.0 ACUTE STREPTOCOCCAL PHARYNGITIS: Primary | ICD-10-CM

## 2022-12-21 RX ORDER — PENICILLIN V POTASSIUM 500 MG/1
500 TABLET ORAL 3 TIMES DAILY
Qty: 30 TABLET | Refills: 0 | Status: SHIPPED | OUTPATIENT
Start: 2022-12-21 | End: 2022-12-31

## 2023-03-21 NOTE — FLOWSHEET NOTE
does next week at her follow-up. Pt reports compliance with her HEP. She continues to report tightness in the calf. OBJECTIVE:     4/27 PROM AROM     L R L R   Dorsiflexion     15 degrees from neutral 5   Plantarflexion      50 60   Inversion      20 35   Eversion      12 20       RESTRICTIONS/PRECAUTIONS: L ankle ORIF due to trimalleolar fx on 2/14; WBAT with boot starting 4/2/2022    Exercises/Interventions:     Therapeutic Exercise (94815)  Resistance / level Sets/sec Reps Notes / Cues   Seated Gastroc/Soleus Stretch     EOB PF/DF  peach    EOB IV/EV peach    Towel Scrunches  Seated HR/TR  Hamstring stretch  30\" 3 4/4   Sidelying clamshells Sidelying SLR ABD Standing gastroc/soleus stretch   30\" 3 each 4/29   Recumbent bike   5'  4/29                                                                  Heel slide supine  Therapeutic Activities (32210)       Gait training/stair training with no crutches Biodex Balance Gait mechanics out of boot   15' 4/29                                             Neuromuscular Re-ed (48603)       Rocker Board PF/DF/IV/EV    -L only               Manual Intervention ([address])       Knee mobs/PROM       Tib/Fem Mobs       Patella Mobs       Ankle mobs (distraction, PROM, calcaneal IV/EV, talocural AP) Grade 1-2 12'    Scar Massage 4/4   IASTM to calf and achilles (with tools and active release of gastroc and soleus)  23' 4/13                                          Modalities:     Pt. Education:  -pt educated on diagnosis, prognosis and expectations for rehab  -all pt questions were answered    Home Exercise Program:     Access Code: [email or web address]  URL: [email or web address]  Date: 04/20/2022  Prepared by: Yarelis Kelly    Exercises  Seated Heel Raise - 1 x daily - 7 x weekly - 3 sets - 10 reps  Seated Heel Toe Raises - 1 x daily - 7 x weekly - 3 sets - 10 reps  Hooklying Isometric Clamshell - 1 x daily - 7 x weekly - 3 sets - 10 reps  Sidelying Hip Abduction - 1 x daily - 7 x weekly - 3 sets - 10 reps      Access Code: [email or web address]  URL: [email or web address]  Date: 03/28/2022  Prepared by: Ortiz Burden    Exercises  Supine Single Leg Ankle Pumps - 3 x daily - 7 x weekly - 20 reps  Supine Ankle Inversion Eversion AROM - 3 x daily - 7 x weekly - 20 reps  Long Sitting Calf Stretch with Strap - 3 x daily - 7 x weekly - 4 reps - 15 hold  Seated Toe Towel Scrunches - 3 x daily - 7 x weekly - 20 reps      Therapeutic Exercise and NMR EXR  [x] (29479) Provided verbal/tactile cueing for activities related to strengthening, flexibility, endurance, ROM for improvements in LE, proximal hip, and core control with self care, mobility, lifting, ambulation.  [] (54203) Provided verbal/tactile cueing for activities related to improving balance, coordination, kinesthetic sense, posture, motor skill, proprioception  to assist with LE, proximal hip, and core control in self care, mobility, lifting, ambulation and eccentric single leg control.   [] (43801) Therapist is in constant attendance of 2 or more patients providing skilled therapy interventions, but not providing any significant amount of measurable one-on-one time to either patient, for improvements in LE, proximal hip, and core control in self care, mobility, lifting, ambulation and eccentric single leg control.      NMR and Therapeutic Activities:    [x] (07179 or 86545) Provided verbal/tactile cueing for activities related to improving balance, coordination, kinesthetic sense, posture, motor skill, proprioception and motor activation to allow for proper function of core, proximal hip and LE with self care and ADLs  [] (43057) Gait Re-education- Provided training and instruction to the patient for proper LE, core and proximal hip recruitment and positioning and eccentric body weight control with ambulation re-education including up and down stairs     Home Exercise Program:    [] (71600) Reviewed/Progressed HEP activities related to strengthening, flexibility, endurance, ROM of core, proximal hip and LE for functional self-care, mobility, lifting and ambulation/stair navigation   [] (98080)Reviewed/Progressed HEP activities related to improving balance, coordination, kinesthetic sense, posture, motor skill, proprioception of core, proximal hip and LE for self care, mobility, lifting, and ambulation/stair navigation      Manual Treatments:  PROM / STM / Oscillations-Mobs:  G-I, II, III, IV (PA's, Inf., Post.)  [x] (85840) Provided manual therapy to mobilize LE, proximal hip and/or LS spine soft tissue/joints for the purpose of modulating pain, promoting relaxation,  increasing ROM, reducing/eliminating soft tissue swelling/inflammation/restriction, improving soft tissue extensibility and allowing for proper ROM for normal function with self care, mobility, lifting and ambulation. Modalities:  [] (90406) Vasopneumatic compression: Utilized vasopneumatic compression to decrease edema / swelling for the purpose of improving mobility and quad tone / recruitment which will allow for increased overall function including but not limited to self-care, transfers, ambulation, and ascending / descending stairs. Charges:  Timed Code Treatment Minutes: 45   Total Treatment Minutes: 45     [] EVAL - LOW (30365)   [] EVAL - MOD (00426)  [] EVAL - HIGH (30240)  [] RE-EVAL (46535)  [x] OR(35859) x 1      [] Ionto  [] NMR (20158) x          [] Vaso  [x] Manual (96911) x  2    [] Ultrasound  [] TA x  1     [] Mech Traction (66974)  [] Aquatic Therapy x      [] ES (un) (56603):   [] Home Management Training x  [] ES(attended) (66734)   [] Dry Needling 1-2 muscles (15717):  [] Dry Needling 3+ muscles (946277  [] Group:      [] Other:      GOALS:  Patient stated goal: return to stair climbing, walking and playing with kids. [] Progressing: [] Met: [] Not Met: [] Adjusted    Therapist goals for Patient:   Short Term Goals: To be achieved in: 2 weeks  1. Independent in HEP and progression per patient tolerance, in order to prevent re-injury. [] Progressing: [] Met: [] Not Met: [] Adjusted  2. Patient will have a decrease in pain to facilitate improvement in movement, function, and ADLs as indicated by Functional Deficits. [] Progressing: [] Met: [] Not Met: [] Adjusted    Long Term Goals: To be achieved in: 12 weeks  1. FOTO score of at least 60 to assist with reaching prior level of function. [] Progressing: [] Met: [] Not Met: [] Adjusted  2. Patient will demonstrate increased L ankle dorsiflexion AROM to 0 degrees  to allow for proper joint functioning and ability negotiate stairs safely. 4/27: Lacking 15 from neutral  [] Progressing: [] Met: [] Not Met: [] Adjusted  3. Patient will demonstrate an increase in Strength to at least 4+/5 as well as good proximal hip strength and control to allow for proper functional mobility and safe ascent/descent of stairs. 4/27: Unable to formally assess due to recent screw removal and pain   [] Progressing: [] Met: [] Not Met: [] Adjusted  4. Patient will return to functional activities including playing with her children without increased symptoms or restriction. 4/27: Pt still unable to WBAT without AD due to recent screw removal surgery   [] Progressing: [] Met: [] Not Met: [] Adjusted  5. Pt will be able to maintain appropriate balance in L LE SLS for 15 seconds with no use of UE support to demonstrate safety in navigating stairs and other functional activities. 4/27: Pt still unable to WBAT without AD due to recent screw removal surgery    [] Progressing: [] Met: [] Not Met: [] Adjusted         Overall Progression Towards Functional goals/ Treatment Progress Update:  [] Patient is progressing as expected towards functional goals listed. [] Progression is slowed due to complexities/Impairments listed. [] Progression has been slowed due to co-morbidities.   [x] Plan just implemented, too soon to assess goals progression <30days   [] Goals require adjustment due to lack of progress  [] Patient is not progressing as expected and requires additional follow up with physician  [] Other    Persisting Functional Limitations/Impairments:  []Sitting [x]Standing   [x]Walking [x]Stairs   [x]Transfers [x]ADLs   [x]Squatting/bending []Kneeling  [x]Housework []Job related tasks  [x]Driving [x]Sports/Recreation   [x]Sleeping []Other:    ASSESSMENT: Pt tolerated interventions well today and demonstrates a positive response to manual interventions as demonstrated by improved joint mobility and muscle flexibility of the gastroc and soleus. Extensive manual time spent with the soleus as this muscle was very tight and was preventing CKC dorsiflexion during ambulation out of the boot. Pt demonstrates impaired gait mechanics with lack of CKC dorsiflexion which produces hip extension on R and lack of knee flexion on L as well as decreased heel strike and toe off. She also demonstrates decrease stance phase on the L with decreased stride length on the R with no use of crutches. Pt will continue to benefit from PT interventions in order to maximize strength, ROM, joint mobility and gait mechanics to improve her functional mobility and participation in ADLs. Treatment/Activity Tolerance:  [x] Pt able to complete treatment [] Patient limited by fatique  [] Patient limited by pain  [] Patient limited by other medical complications  [] Other:     Prognosis:  [x] Good [] Fair  [] Poor    Patient Requires Follow-up: [x] Yes  [] No    Return to Play:    [x]  N/A   []  Stage 1: Intro to Strength   []  Stage 2: Return to Run and Strength   []  Stage 3: Return to Jump and Strength   []  Stage 4: Dynamic Strength and Agility   []  Stage 5: Sport Specific Training     []  Ready to Return to Play, Meets All Above Stages   []  Not Ready for Return to Sports   Comments:            PLAN: See gokul. PT 2x / week for 12 weeks.  After next visit on 4/29, Pt will transition to [employer] program as insurance termination due to change in job status at the end of April. [x] Continue per plan of care [] Alter current plan (see comments)  [] Plan of care initiated [] Hold pending MD visit [] Discharge    Electronically signed by: Isidro Berger, PT, DPT, OMT-C  Therapist was present, directed the patient's care, made skilled judgement, and was responsible for assessment and treatment of the patient. Vanda Gordon, SPT       Note: If patient does not return for scheduled/ recommended follow up visits, this note will serve as a discharge from care along with most recent update on progress. Skin Substitute Text: The defect edges were debeveled with a #15 scalpel blade.  Given the location of the defect, shape of the defect and the proximity to free margins a skin substitute graft was deemed most appropriate.  The graft material was trimmed to fit the size of the defect. The graft was then placed in the primary defect and oriented appropriately.

## 2023-08-10 ENCOUNTER — TELEPHONE (OUTPATIENT)
Dept: FAMILY MEDICINE CLINIC | Age: 33
End: 2023-08-10

## 2023-08-10 NOTE — TELEPHONE ENCOUNTER
Physical Scheduled Date: 08/16/23    Last Physical Date: -     Physician Scheduled with: Prachi Rocha    Needs a Physical Kit: labs    Verified Phone Number: yes    Be sure to tell the Patient to Fast 10 to 12 hours before having their Blood Draw.

## 2023-08-16 ENCOUNTER — APPOINTMENT (OUTPATIENT)
Dept: CT IMAGING | Age: 33
End: 2023-08-16
Payer: COMMERCIAL

## 2023-08-16 ENCOUNTER — OFFICE VISIT (OUTPATIENT)
Dept: FAMILY MEDICINE CLINIC | Age: 33
End: 2023-08-16
Payer: COMMERCIAL

## 2023-08-16 ENCOUNTER — HOSPITAL ENCOUNTER (EMERGENCY)
Age: 33
Discharge: HOME | End: 2023-08-16
Attending: EMERGENCY MEDICINE
Payer: COMMERCIAL

## 2023-08-16 ENCOUNTER — APPOINTMENT (OUTPATIENT)
Dept: GENERAL RADIOLOGY | Age: 33
End: 2023-08-16
Payer: COMMERCIAL

## 2023-08-16 VITALS
SYSTOLIC BLOOD PRESSURE: 133 MMHG | WEIGHT: 192 LBS | OXYGEN SATURATION: 100 % | TEMPERATURE: 97.7 F | RESPIRATION RATE: 18 BRPM | DIASTOLIC BLOOD PRESSURE: 76 MMHG | BODY MASS INDEX: 29.19 KG/M2 | HEART RATE: 94 BPM

## 2023-08-16 VITALS
OXYGEN SATURATION: 99 % | HEIGHT: 68 IN | WEIGHT: 192 LBS | BODY MASS INDEX: 29.1 KG/M2 | RESPIRATION RATE: 14 BRPM | HEART RATE: 78 BPM | SYSTOLIC BLOOD PRESSURE: 122 MMHG | DIASTOLIC BLOOD PRESSURE: 86 MMHG

## 2023-08-16 DIAGNOSIS — R20.2 NUMBNESS AND TINGLING OF LEFT SIDE OF FACE: Primary | ICD-10-CM

## 2023-08-16 DIAGNOSIS — H91.92 DECREASED HEARING OF LEFT EAR: ICD-10-CM

## 2023-08-16 DIAGNOSIS — R20.0 NUMBNESS AND TINGLING OF LEFT SIDE OF FACE: Primary | ICD-10-CM

## 2023-08-16 DIAGNOSIS — R20.2 PARESTHESIA: Primary | ICD-10-CM

## 2023-08-16 LAB
ALBUMIN SERPL-MCNC: 4.4 G/DL (ref 3.4–5)
ALBUMIN/GLOB SERPL: 1.4 {RATIO} (ref 1.1–2.2)
ALP SERPL-CCNC: 112 U/L (ref 40–129)
ALT SERPL-CCNC: 22 U/L (ref 10–40)
ANION GAP SERPL CALCULATED.3IONS-SCNC: 11 MMOL/L (ref 3–16)
APTT BLD: 27.7 SEC (ref 22.7–35.9)
AST SERPL-CCNC: 18 U/L (ref 15–37)
BASOPHILS # BLD: 0.1 K/UL (ref 0–0.2)
BASOPHILS NFR BLD: 1 %
BILIRUB SERPL-MCNC: 0.3 MG/DL (ref 0–1)
BUN SERPL-MCNC: 10 MG/DL (ref 7–20)
CALCIUM SERPL-MCNC: 9.5 MG/DL (ref 8.3–10.6)
CHLORIDE SERPL-SCNC: 105 MMOL/L (ref 99–110)
CO2 SERPL-SCNC: 24 MMOL/L (ref 21–32)
CREAT SERPL-MCNC: 0.8 MG/DL (ref 0.6–1.1)
DEPRECATED RDW RBC AUTO: 12.3 % (ref 12.4–15.4)
EOSINOPHIL # BLD: 0.1 K/UL (ref 0–0.6)
EOSINOPHIL NFR BLD: 1 %
GFR SERPLBLD CREATININE-BSD FMLA CKD-EPI: >60 ML/MIN/{1.73_M2}
GLUCOSE SERPL-MCNC: 114 MG/DL (ref 70–99)
HCG SERPL QL: NEGATIVE
HCT VFR BLD AUTO: 44.8 % (ref 36–48)
HGB BLD-MCNC: 14.9 G/DL (ref 12–16)
INR PPP: 0.91 (ref 0.84–1.16)
LYMPHOCYTES # BLD: 2.6 K/UL (ref 1–5.1)
LYMPHOCYTES NFR BLD: 27.6 %
MCH RBC QN AUTO: 30.3 PG (ref 26–34)
MCHC RBC AUTO-ENTMCNC: 33.3 G/DL (ref 31–36)
MCV RBC AUTO: 91 FL (ref 80–100)
MONOCYTES # BLD: 0.5 K/UL (ref 0–1.3)
MONOCYTES NFR BLD: 4.8 %
NEUTROPHILS # BLD: 6.2 K/UL (ref 1.7–7.7)
NEUTROPHILS NFR BLD: 65.6 %
NT-PROBNP SERPL-MCNC: <36 PG/ML (ref 0–124)
PLATELET # BLD AUTO: 301 K/UL (ref 135–450)
PMV BLD AUTO: 8.9 FL (ref 5–10.5)
POTASSIUM SERPL-SCNC: 4.2 MMOL/L (ref 3.5–5.1)
PROT SERPL-MCNC: 7.5 G/DL (ref 6.4–8.2)
PROTHROMBIN TIME: 12.3 SEC (ref 11.5–14.8)
RBC # BLD AUTO: 4.92 M/UL (ref 4–5.2)
SODIUM SERPL-SCNC: 140 MMOL/L (ref 136–145)
TROPONIN, HIGH SENSITIVITY: <6 NG/L (ref 0–14)
WBC # BLD AUTO: 9.5 K/UL (ref 4–11)

## 2023-08-16 PROCEDURE — 85610 PROTHROMBIN TIME: CPT

## 2023-08-16 PROCEDURE — 70498 CT ANGIOGRAPHY NECK: CPT

## 2023-08-16 PROCEDURE — 99215 OFFICE O/P EST HI 40 MIN: CPT | Performed by: NURSE PRACTITIONER

## 2023-08-16 PROCEDURE — 84484 ASSAY OF TROPONIN QUANT: CPT

## 2023-08-16 PROCEDURE — 99285 EMERGENCY DEPT VISIT HI MDM: CPT

## 2023-08-16 PROCEDURE — 70450 CT HEAD/BRAIN W/O DYE: CPT

## 2023-08-16 PROCEDURE — 71045 X-RAY EXAM CHEST 1 VIEW: CPT

## 2023-08-16 PROCEDURE — 85025 COMPLETE CBC W/AUTO DIFF WBC: CPT

## 2023-08-16 PROCEDURE — 6360000004 HC RX CONTRAST MEDICATION: Performed by: PHYSICIAN ASSISTANT

## 2023-08-16 PROCEDURE — 80053 COMPREHEN METABOLIC PANEL: CPT

## 2023-08-16 PROCEDURE — 84703 CHORIONIC GONADOTROPIN ASSAY: CPT

## 2023-08-16 PROCEDURE — 6370000000 HC RX 637 (ALT 250 FOR IP): Performed by: PHYSICIAN ASSISTANT

## 2023-08-16 PROCEDURE — 83880 ASSAY OF NATRIURETIC PEPTIDE: CPT

## 2023-08-16 PROCEDURE — 85730 THROMBOPLASTIN TIME PARTIAL: CPT

## 2023-08-16 PROCEDURE — 93005 ELECTROCARDIOGRAM TRACING: CPT | Performed by: PHYSICIAN ASSISTANT

## 2023-08-16 RX ORDER — MECLIZINE HCL 12.5 MG/1
25 TABLET ORAL ONCE
Status: COMPLETED | OUTPATIENT
Start: 2023-08-16 | End: 2023-08-16

## 2023-08-16 RX ADMIN — MECLIZINE 25 MG: 12.5 TABLET ORAL at 19:34

## 2023-08-16 RX ADMIN — IOPAMIDOL 75 ML: 755 INJECTION, SOLUTION INTRAVENOUS at 20:13

## 2023-08-16 SDOH — ECONOMIC STABILITY: HOUSING INSECURITY
IN THE LAST 12 MONTHS, WAS THERE A TIME WHEN YOU DID NOT HAVE A STEADY PLACE TO SLEEP OR SLEPT IN A SHELTER (INCLUDING NOW)?: NO

## 2023-08-16 SDOH — ECONOMIC STABILITY: FOOD INSECURITY: WITHIN THE PAST 12 MONTHS, THE FOOD YOU BOUGHT JUST DIDN'T LAST AND YOU DIDN'T HAVE MONEY TO GET MORE.: OFTEN TRUE

## 2023-08-16 SDOH — ECONOMIC STABILITY: FOOD INSECURITY: WITHIN THE PAST 12 MONTHS, YOU WORRIED THAT YOUR FOOD WOULD RUN OUT BEFORE YOU GOT MONEY TO BUY MORE.: OFTEN TRUE

## 2023-08-16 SDOH — ECONOMIC STABILITY: INCOME INSECURITY: HOW HARD IS IT FOR YOU TO PAY FOR THE VERY BASICS LIKE FOOD, HOUSING, MEDICAL CARE, AND HEATING?: VERY HARD

## 2023-08-16 ASSESSMENT — ENCOUNTER SYMPTOMS
SINUS PRESSURE: 0
COUGH: 0
FACIAL SWELLING: 0
SHORTNESS OF BREATH: 0
WHEEZING: 0
BLOOD IN STOOL: 0
SORE THROAT: 0
DIARRHEA: 0
ABDOMINAL PAIN: 0
CHEST TIGHTNESS: 0
SINUS PAIN: 0
TROUBLE SWALLOWING: 0
NAUSEA: 0
CONSTIPATION: 0
VOMITING: 0
EYES NEGATIVE: 1

## 2023-08-16 ASSESSMENT — PATIENT HEALTH QUESTIONNAIRE - PHQ9
SUM OF ALL RESPONSES TO PHQ9 QUESTIONS 1 & 2: 1
2. FEELING DOWN, DEPRESSED OR HOPELESS: 1
SUM OF ALL RESPONSES TO PHQ QUESTIONS 1-9: 1
1. LITTLE INTEREST OR PLEASURE IN DOING THINGS: 0
SUM OF ALL RESPONSES TO PHQ QUESTIONS 1-9: 1

## 2023-08-16 ASSESSMENT — VISUAL ACUITY: OU: 1

## 2023-08-16 ASSESSMENT — PAIN - FUNCTIONAL ASSESSMENT: PAIN_FUNCTIONAL_ASSESSMENT: 0-10

## 2023-08-16 ASSESSMENT — PAIN SCALES - GENERAL: PAINLEVEL_OUTOF10: 0

## 2023-08-17 LAB
EKG ATRIAL RATE: 83 BPM
EKG DIAGNOSIS: NORMAL
EKG P AXIS: 43 DEGREES
EKG P-R INTERVAL: 136 MS
EKG Q-T INTERVAL: 370 MS
EKG QRS DURATION: 82 MS
EKG QTC CALCULATION (BAZETT): 434 MS
EKG R AXIS: 59 DEGREES
EKG T AXIS: 21 DEGREES
EKG VENTRICULAR RATE: 83 BPM

## 2023-08-17 ASSESSMENT — ENCOUNTER SYMPTOMS
CHEST TIGHTNESS: 0
BACK PAIN: 0
SHORTNESS OF BREATH: 0
DIARRHEA: 0
RESPIRATORY NEGATIVE: 1
PHOTOPHOBIA: 0
ABDOMINAL PAIN: 0
NAUSEA: 0
VOMITING: 0
CONSTIPATION: 0
COUGH: 0
COLOR CHANGE: 0

## 2023-08-17 NOTE — ED NOTES
Discharge instructions given, patient acknowledged understanding, patient ambulated out of ed upon discharge      Vlad Riggs RN  08/16/23 5719

## (undated) DEVICE — LOWER EXTREMITY: Brand: MEDLINE INDUSTRIES, INC.

## (undated) DEVICE — TOWEL,STOP FLAG GOLD N-W: Brand: MEDLINE

## (undated) DEVICE — CANISTER, RIGID, 1200CC: Brand: MEDLINE INDUSTRIES, INC.

## (undated) DEVICE — T-DRAPE,EXTREMITY,STERILE: Brand: MEDLINE

## (undated) DEVICE — 4-PORT MANIFOLD: Brand: NEPTUNE 2

## (undated) DEVICE — GLOVE SURG SZ 65 THK91MIL LTX FREE SYN POLYISOPRENE

## (undated) DEVICE — GLOVE SURG 9 PF CRM STRL SENSICARE PI MIC LF

## (undated) DEVICE — SOLUTION IV IRRIG POUR BRL 0.9% SODIUM CHL 2F7124

## (undated) DEVICE — INTENDED FOR TISSUE SEPARATION, AND OTHER PROCEDURES THAT REQUIRE A SHARP SURGICAL BLADE TO PUNCTURE OR CUT.: Brand: BARD-PARKER ® STAINLESS STEEL BLADES

## (undated) DEVICE — ZIMMER® STERILE DISPOSABLE TOURNIQUET CUFF WITH PLC, DUAL PORT, SINGLE BLADDER, 18 IN. (46 CM)

## (undated) DEVICE — TUBING, SUCTION, 1/4" X 12', STRAIGHT: Brand: MEDLINE

## (undated) DEVICE — BANDAGE COMPR W6INXL10YD ST M E WHITE/BEIGE

## (undated) DEVICE — PENCIL ES L3M BTTN SWCH S STL HEX LOK BLDE ELECTRD HOLSTER

## (undated) DEVICE — Z DISCONTINUED USE 2272117 DRAPE SURG 3 QTR N INVASIVE 2 LAYR DISP

## (undated) DEVICE — RECTAL: Brand: MEDLINE INDUSTRIES, INC.

## (undated) DEVICE — GLOVE ORANGE PI 8 1/2   MSG9085

## (undated) DEVICE — SUTURE NONABSORBABLE MONOFILAMENT 4-0 FS-2 18 IN ETHILON 662H

## (undated) DEVICE — DRESSING,GAUZE,XEROFORM,CURAD,1"X8",ST: Brand: CURAD

## (undated) DEVICE — GAUZE FLUFF 1 PLY: Brand: DEROYAL

## (undated) DEVICE — C-ARM PACK: Brand: C-ARM COVER

## (undated) DEVICE — SHEET,T,THYROID,STERILE: Brand: MEDLINE

## (undated) DEVICE — BANDAGE COMPR W4INXL12FT E DISP ESMARCH EVEN

## (undated) DEVICE — PAD,NON-ADHERENT,3X8,STERILE,LF,1/PK: Brand: MEDLINE

## (undated) DEVICE — Z CONVERTED USE 2273164 BANDAGE COMPR W4INXL4 1/2YD E EC SGL LAYERED CLP CLSR ECONO

## (undated) DEVICE — BLADE ES ELASTOMERIC COAT INSUL DURABLE BEND UPTO 90DEG

## (undated) DEVICE — STRIP,CLOSURE,WOUND,MEDI-STRIP,1/2X4: Brand: MEDLINE

## (undated) DEVICE — SYRINGE IRRIG 60ML SFT PLIABLE BLB EZ TO GRP 1 HND USE W/

## (undated) DEVICE — ZIMMER® STERILE DISPOSABLE TOURNIQUET CUFF WITH PLC, DUAL PORT, SINGLE BLADDER, 24 IN. (61 CM)

## (undated) DEVICE — 3M™ STERI-DRAPE™ U-DRAPE 1015: Brand: STERI-DRAPE™

## (undated) DEVICE — BIT DRL L110MM DIA2.5MM G QUIK CPL W/O STP REUSE

## (undated) DEVICE — POSITIONER HD REST FOAM CMFRT TCH

## (undated) DEVICE — GLOVE SURG SZ 8 CRM LTX FREE POLYISOPRENE POLYMER BEAD ANTI

## (undated) DEVICE — GOWN SIRUS NONREIN XL W/TWL: Brand: MEDLINE INDUSTRIES, INC.

## (undated) DEVICE — SUTURE VCRL + 1 L27IN ABSRB UD CT-1 L36MM 1/2 CIR TAPR PNT VCP261H

## (undated) DEVICE — 3M™ STERI-STRIP™ COMPOUND BENZOIN TINCTURE 40 BAGS/CARTON 4 CARTONS/CASE C1544: Brand: 3M™ STERI-STRIP™

## (undated) DEVICE — TOWEL,OR,DSP,ST,BLUE,STD,4/PK,20PK/CS: Brand: MEDLINE

## (undated) DEVICE — SUTURE VCRL + SZ 3-0 L18IN ABSRB UD SH 1/2 CIR TAPERCUT NDL VCP864D

## (undated) DEVICE — MINOR SET UP: Brand: MEDLINE INDUSTRIES, INC.

## (undated) DEVICE — GLOVE SURG SZ 85 L12IN FNGR THK94MIL STD WHT LTX FREE

## (undated) DEVICE — GLOVE SURG SZ 65 L12IN FNGR THK79MIL GRN LTX FREE

## (undated) DEVICE — SUTURE VCRL + SZ 2-0 L27IN ABSRB CLR CT-1 1/2 CIR TAPERCUT VCP259H

## (undated) DEVICE — SPONGE GZ W4XL4IN COT 12 PLY TYP VII WVN C FLD DSGN

## (undated) DEVICE — BANDAGE COMPR W4INXL15FT BGE E SGL LAYERED CLP CLSR

## (undated) DEVICE — 3M™ COBAN™ NL STERILE NON-LATEX SELF-ADHERENT WRAP, 2084S, 4 IN X 5 YD (10 CM X 4,5 M), 18 ROLLS/CASE: Brand: 3M™ COBAN™

## (undated) DEVICE — DRAPE,U/ SHT,SPLIT,PLAS,STERIL: Brand: MEDLINE

## (undated) DEVICE — C-ARM: Brand: UNBRANDED

## (undated) DEVICE — NEEDLE HYPO 23GA L1.5IN TURQ POLYPR HUB S STL THN WALL IM

## (undated) DEVICE — GLOVE SURG SZ 7 CRM LTX FREE POLYISOPRENE POLYMER BEAD ANTI

## (undated) DEVICE — PADDING UNDERCAST W4INXL4YD 100% COT CRIMPED FINISH WBRL II

## (undated) DEVICE — HYPODERMIC SAFETY NEEDLE: Brand: MAGELLAN

## (undated) DEVICE — SUTURE MCRYL + SZ 4-0 L27IN ABSRB UD L19MM PS-2 3/8 CIR MCP426H

## (undated) DEVICE — SYRINGE MED 10ML LUERLOCK TIP W/O SFTY DISP